# Patient Record
Sex: MALE | Race: WHITE | NOT HISPANIC OR LATINO | URBAN - METROPOLITAN AREA
[De-identification: names, ages, dates, MRNs, and addresses within clinical notes are randomized per-mention and may not be internally consistent; named-entity substitution may affect disease eponyms.]

---

## 2020-11-05 ENCOUNTER — TRANSCRIBE ORDERS (OUTPATIENT)
Dept: ADMINISTRATIVE | Facility: HOSPITAL | Age: 70
End: 2020-11-05

## 2020-11-05 DIAGNOSIS — I71.2 THORACIC AORTIC ANEURYSM, WITHOUT RUPTURE (HCC): Primary | ICD-10-CM

## 2020-11-12 ENCOUNTER — HOSPITAL ENCOUNTER (OUTPATIENT)
Dept: RADIOLOGY | Age: 70
Discharge: HOME | End: 2020-11-12
Payer: COMMERCIAL

## 2020-11-12 DIAGNOSIS — I71.2 THORACIC AORTIC ANEURYSM, WITHOUT RUPTURE (HCC): ICD-10-CM

## 2020-11-12 PROCEDURE — 71250 CT THORAX DX C-: CPT

## 2020-11-12 PROCEDURE — G1004 CDSM NDSC: HCPCS

## 2022-08-18 ENCOUNTER — OFFICE VISIT (OUTPATIENT)
Dept: CARDIOLOGY CLINIC | Facility: CLINIC | Age: 72
End: 2022-08-18
Payer: COMMERCIAL

## 2022-08-18 VITALS
DIASTOLIC BLOOD PRESSURE: 70 MMHG | SYSTOLIC BLOOD PRESSURE: 106 MMHG | HEIGHT: 71 IN | BODY MASS INDEX: 26.46 KG/M2 | HEART RATE: 90 BPM | WEIGHT: 189 LBS

## 2022-08-18 DIAGNOSIS — Q24.8 ABNORMAL CARDIAC VALVE: Primary | ICD-10-CM

## 2022-08-18 DIAGNOSIS — Z13.6 SCREENING FOR CARDIOVASCULAR CONDITION: ICD-10-CM

## 2022-08-18 DIAGNOSIS — I10 PRIMARY HYPERTENSION: ICD-10-CM

## 2022-08-18 DIAGNOSIS — E78.5 HYPERLIPIDEMIA, UNSPECIFIED HYPERLIPIDEMIA TYPE: ICD-10-CM

## 2022-08-18 PROCEDURE — 99204 OFFICE O/P NEW MOD 45 MIN: CPT | Performed by: STUDENT IN AN ORGANIZED HEALTH CARE EDUCATION/TRAINING PROGRAM

## 2022-08-18 PROCEDURE — 93000 ELECTROCARDIOGRAM COMPLETE: CPT | Performed by: STUDENT IN AN ORGANIZED HEALTH CARE EDUCATION/TRAINING PROGRAM

## 2022-08-18 RX ORDER — ASPIRIN 81 MG/1
81 TABLET ORAL DAILY
COMMUNITY

## 2022-08-18 RX ORDER — DIPHENOXYLATE HYDROCHLORIDE AND ATROPINE SULFATE 2.5; .025 MG/1; MG/1
1 TABLET ORAL DAILY
COMMUNITY
End: 2022-10-10

## 2022-08-18 RX ORDER — MAG HYDROX/ALUMINUM HYD/SIMETH 400-400-40
1 SUSPENSION, ORAL (FINAL DOSE FORM) ORAL DAILY
COMMUNITY
End: 2022-10-10

## 2022-08-18 RX ORDER — LISINOPRIL 20 MG/1
1 TABLET ORAL DAILY
COMMUNITY
Start: 2022-08-13 | End: 2022-10-10

## 2022-08-18 NOTE — PROGRESS NOTES
Outpatient Cardiology Consult Note - [facility] 74 y o  male MRN: [id number]    @ Encounter: [id number]      There are no problems to display for this patient  Assessment:  74 y o  male Hx per chart p/w second opinion for timing of intervention for Ao aneurysm, BAV, severe AS, moderate AI; follows at [facility] male   at [facility] hip OA  Follows cards and CTS at [facility]  HTN  Asc Ao aneurysm 4 6cm  HLD  Bicuspid Ao valve, severe AS, considering intervention now, sees CTS at [facility]  Moderate AI      a1c 5 9  tsh 2 2  ASCVD 32% at 8 yr  a1c 5 9    CT chest 9/2021  Heavy aortic valve calcification with fusiform ascending aortic aneurysm  Unchanged 4 5 cm     TODAY's PLAN:  - provided extensive counseling and advice regarding his cardiac conditions  - he is reticent to have intervention in immediate future, 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 and fact that he feels very well, asymptomatic  - advised that intervention likely inevitable, TAVR not ideal given pathology and need for Ao intervention (the patient asked about transcatheter "groin" options), would likely need open chest surgery of AV and Ao  - does extensive gym workouts and very active life - no symptoms or worse subtle fatigue whatsoever that he reports  - ordered exercise stress test to parse out symptoms and hemodynamic response to exercise - to better facilitate timing of intervention decision (must avoid very high BP 2/2 Au aneurysm during test)  Currently clinical presentation suggests very near term intervention though not immediate based on data we have now; progression is likely  Will check new TTE before stress test, particularly for re-check Asc Ao anuerysm size  - he is considering moving care between [facility] vs [facility] [place] but unsure yet  Further plan pending this decision by patient  - No [facility] cardiac imaging in [facility]   Has not seen [facility] CTS as far as I can tell, last [facility] TTE 1/2022 as below, last TTE 1/2022 per pt  - repeat VS in epic - better; he has a new sore throat slight fatigue but generally feels well  Advised drink copious water now, if feels any worse or has fever must go to urgent care or ED; advised rest/tylenol/lozenges  - continue with current medications, no changes today    HPI:   74 y o  male Hx per chart p/w second opinion for timing of intervention for Ao aneurysm, BAV, severe AS, moderate AI; follows at [facility]  EF preserved and LV not dilated per OSH TTE  Aside from new sore throat as described above, he reports being in excellent health, 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, goes to gym multiple times weekly and uses weights, elliptical, situps - never gets worse sob, cp, palpitations, dizziness  Syncope in heat ovr 20 years ago, none since  Feels good energy level  Reports no progression of cardiac sx or fatigue in recent months/years  No new CP/SOB/dizziness/palpitations/syncope  No new leg swelling, PND, pillow orthopnea, unintentional weight changes  11/2021 OSH cards note:  Stable at 4 5-4 6 cm for the past 2-3 years  Follow up with CTS  Aware of ~5 cm threshold for surgery given his BAV  He will avoid heavy lifting and exertion  BP is controlled    Home meds:   aspirin 81 MG EC tablet Take 81 mg by mouth daily   cartilage/collagen/bor/hyalur (JOINT HEALTH ORAL) Take 120 capsules by mouth   lisinopriL (PRINIVIL,ZESTRIL) 20 MG tablet TAKE 1 TABLET BY MOUTH DAILY 90 tablet 3    magnesium oxide 200 mg magnesium chew Take by mouth  Magnesium w/ b6    multivitamin (THERAGRAN) tab Take 1 tablet by mouth daily   SAW PALMETTO FRUIT ORAL Take 1 tablet by mouth daily   sildenafil, antihypertensive, (REVATIO) 20 mg tablet Use 3-5 tabs PO prn prior to sexual activity 30 tablet 5     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   He drinks 2-3 glasses of red wine nightly  He is originally from [facility] [place] [facility]       No current tobacco use, alcohol abuse, illicit drug use  No past medical history on file  ROS:  10 point ROS negative except as specified in HPI    Allergies   Allergen Reactions    Pollen Extract Nasal Congestion       Current Outpatient Medications   Medication Instructions    aspirin (ECOTRIN LOW STRENGTH) 81 mg, Oral, Daily    lisinopril (ZESTRIL) 20 mg tablet 1 tablet, Oral, Daily    Magnesium 100 MG CAPS Oral    multivitamin (THERAGRAN) TABS 1 tablet, Oral, Daily    Saw [place] 450 MG CAPS 1 tablet, Oral, Daily        Social History     Socioeconomic History    Marital status: /Civil Union     Spouse name: Not on file    Number of children: Not on file    Years of education: Not on file    Highest education level: Not on file   Occupational History    Not on file   Tobacco Use    Smoking status: Former Smoker     Types: Cigarettes     Quit date:      Years since quittin 6    Smokeless tobacco: Never Used   Substance and Sexual Activity    Alcohol use: Not on file    Drug use: Not on file    Sexual activity: Not on file   Other Topics Concern    Not on file   Social History Narrative    Not on file     Social Determinants of Health     Financial Resource Strain: Not on file   Food Insecurity: Not on file   Transportation Needs: Not on file   Physical Activity: Not on file   Stress: Not on file   Social Connections: Not on file   Intimate Partner Violence: Not on file   Housing Stability: Not on file       No family history on file      Physical Exam:  Vitals:    22 1314 22 1411   BP: 92/50 106/70   BP Location: Right arm    Cuff Size: Standard    Pulse: (!) 110 90   Weight: 85 7 kg (189 lb)    Height: 5' 11" (1 803 m)      Repeat VS - 106/70, HR 90    Constitutional: NAD, non toxic  Ears/nose/mouth/throat: atraumatic  CV: RRR, nl S1S2, no murmurs/rubs/gallups, no JVD, no HJR  Resp: ctabl  GI: Soft, NTND  MSK: no swollen joints in exposed areas  Extr: No edema, WWP  Pysche: Normal affect  Neuro: appropriate in conversation  Skin: dry and intact in exposed areas    Labs & Results:    No results found for: WBC, HGB, HCT, MCV, PLT  No results found for: SODIUM, K, CL, CO2, BUN, CREATININE, GLUC, CALCIUM  No results found for: BNP   No results found for: CHOLESTEROL  No results found for: HDL  No results found for: TRIG  No results found for: [place]    Studies - personally reviewed by me:    EKG:  Personally reviewed by me  8/18/22: nsr, nl axis, LVH, LAE, anterior Q waves, non specific STT changes    [facility] data:  TTE (1/2022):  Left Ventricle: There is severe concentric hypertrophy  Systolic function is normal with an ejection fraction of 60-65%  There is grade I (mild) diastolic dysfunction and normal left atrial pressure   Left Atrium: Left atrium cavity is moderately dilated   Aortic Valve: There is at least moderate regurgitation  There is severe stenosis   Ascending Aorta: The aortic root is mildly dilated  The ascending aorta is mildly dilated   Tricuspid Valve: There is mild regurgitation  CT chest (9/21): Stable pulmonary nodularity at 3 years follow-up      Heavy aortic valve calcification with fusiform ascending aortic aneurysm  Unchanged    Direct review of study demonstrates unchanged appearance of aorta measuring approximately 4 5 cm in maximum diameter  Counseling / Coordination of Care  Time spent today 50 minutes  Greater than 50% of total time was spent with the patient and / or family counseling and / or coordination of care  We discussed diagnoses, most recent studies and any changes in treatment  Thank you for the opportunity to participate in the care of this patient      Delaney Jackson MD  Attending Physician  Advanced Heart Failure and Transplant Cardiology  "[employer]" [facility]

## 2022-08-19 ENCOUNTER — TELEPHONE (OUTPATIENT)
Dept: CARDIOLOGY CLINIC | Facility: CLINIC | Age: 72
End: 2022-08-19

## 2022-08-19 ENCOUNTER — OFFICE VISIT (OUTPATIENT)
Dept: UROLOGY | Facility: AMBULATORY SURGERY CENTER | Age: 72
End: 2022-08-19
Payer: COMMERCIAL

## 2022-08-19 VITALS
OXYGEN SATURATION: 97 % | SYSTOLIC BLOOD PRESSURE: 106 MMHG | HEART RATE: 95 BPM | DIASTOLIC BLOOD PRESSURE: 78 MMHG | BODY MASS INDEX: 25.52 KG/M2 | WEIGHT: 183 LBS

## 2022-08-19 DIAGNOSIS — Z12.5 SCREENING FOR PROSTATE CANCER: Primary | ICD-10-CM

## 2022-08-19 LAB
SL AMB  POCT GLUCOSE, UA: NORMAL
SL AMB LEUKOCYTE ESTERASE,UA: NORMAL
SL AMB POCT BILIRUBIN,UA: NORMAL
SL AMB POCT BLOOD,UA: NORMAL
SL AMB POCT CLARITY,UA: CLEAR
SL AMB POCT COLOR,UA: YELLOW
SL AMB POCT KETONES,UA: NORMAL
SL AMB POCT NITRITE,UA: NORMAL
SL AMB POCT PH,UA: 6.5
SL AMB POCT SPECIFIC GRAVITY,UA: 1
SL AMB POCT URINE PROTEIN: NORMAL
SL AMB POCT UROBILINOGEN: 0.2

## 2022-08-19 PROCEDURE — 81002 URINALYSIS NONAUTO W/O SCOPE: CPT | Performed by: UROLOGY

## 2022-08-19 PROCEDURE — 99204 OFFICE O/P NEW MOD 45 MIN: CPT | Performed by: UROLOGY

## 2022-08-19 NOTE — TELEPHONE ENCOUNTER
----- Message from Dulce Cooper MD sent at 8/19/2022  2:14 PM EDT -----  Regarding: echo scheduling request  Please schedule Mr Veronica Vora for an echo in the next week  Echo must be done BEFORE his already scheduled stress test  Thnx  I called but his wife said call back    His stress test is scheduled 8/29/22

## 2022-08-19 NOTE — PROGRESS NOTES
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 [employer]  He has history of previous elevated PSA and gross hematuria, likely associated with prostatitis  CT urogram and cystoscopy were [facility] at [facility] in 2017  He also has a prior history of prostate biopsy in  which did not reveal any malignancy  Over the last few years his PSA has been stable around 1 5  His last PSA was in   He denies any urinary complaints at this time  He is looking to transfer care and seeks evaluation and prostate cancer screening  He takes saw palmetto as a supplement and is happy with this  Review of Systems  Review of Systems      Past Medical History  Past Medical History:   Diagnosis Date    Prostatitis        Past Social History  History reviewed  No pertinent surgical history  Past Family History  History reviewed  No pertinent family history      Past Social history  Social History     Socioeconomic History    Marital status: /Civil Union     Spouse name: Not on file    Number of children: Not on file    Years of education: Not on file    Highest education level: Not on file   Occupational History    Not on file   Tobacco Use    Smoking status: Former Smoker     Types: Cigarettes     Quit date: 1963     Years since quittin 6    Smokeless tobacco: Never Used    Tobacco comment: very light social as a teenager   Vaping Use    Vaping Use: Never used   Substance and Sexual Activity    Alcohol use: Yes     Alcohol/week: 14 0 standard drinks     Types: 14 Glasses of wine per week    Drug use: Not Currently    Sexual activity: Yes     Partners: Female   Other Topics Concern    Not on file   Social History Narrative    Not on file     Social Determinants of Health     Financial Resource Strain: Not on file   Food Insecurity: Not on file   Transportation Needs: Not on file   Physical Activity: Not on file   Stress: Not on file   Social Connections: Not on file   Intimate Partner Violence: Not on file   Housing Stability: Not on file     Social History     Tobacco Use   Smoking Status Former Smoker    Types: Cigarettes    Quit date:     Years since quittin 6   Smokeless Tobacco Never Used   Tobacco Comment    very light social as a teenager       Current Medications  Current Outpatient Medications   Medication Sig Dispense Refill    aspirin (ECOTRIN LOW STRENGTH) 81 mg EC tablet Take 81 mg by mouth daily      lisinopril (ZESTRIL) 20 mg tablet Take 1 tablet by mouth daily      Magnesium 100 MG CAPS Take by mouth      multivitamin (THERAGRAN) TABS Take 1 tablet by mouth daily      Saw [place] 450 MG CAPS Take 1 tablet by mouth daily       No current facility-administered medications for this visit  Allergies  Allergies   Allergen Reactions    Pollen Extract Nasal Congestion       Past Medical History, Social History, Family History, medications and allergies were reviewed  Vitals  Vitals:    22 1356   BP: 106/78   BP Location: Right arm   Patient Position: Sitting   Cuff Size: Adult   Pulse: 95   SpO2: 97%   Weight: 83 kg (183 lb)       Physical Exam  Physical Exam  Vitals reviewed  Constitutional:       Appearance: He is well-developed  HENT:      Head: Normocephalic and atraumatic  Eyes:      Conjunctiva/sclera: Conjunctivae normal    Cardiovascular:      Rate and Rhythm: Normal rate     Pulmonary:      Effort: Pulmonary effort is normal  Abdominal:      Palpations: Abdomen is soft  Genitourinary:     Comments: Prostate smooth, about 35 g, no palpable nodules  Musculoskeletal:         General: Normal range of motion  Skin:     General: Skin is warm and dry  Neurological:      Mental Status: He is alert and oriented to person, place, and time     Psychiatric:         Mood and Affect: Mood normal            Results  No results found for: PSA  No results found for: GLUCOSE, CALCIUM, NA, K, CO2, CL, BUN, CREATININE  No results found for: WBC, HGB, HCT, MCV, PLT

## 2022-08-25 ENCOUNTER — HOSPITAL ENCOUNTER (OUTPATIENT)
Dept: NON INVASIVE DIAGNOSTICS | Facility: HOSPITAL | Age: 72
Discharge: HOME | End: 2022-08-25
Attending: STUDENT IN AN ORGANIZED HEALTH CARE EDUCATION/TRAINING PROGRAM
Payer: COMMERCIAL

## 2022-08-25 VITALS
BODY MASS INDEX: 25.62 KG/M2 | WEIGHT: 183 LBS | SYSTOLIC BLOOD PRESSURE: 106 MMHG | HEART RATE: 95 BPM | DIASTOLIC BLOOD PRESSURE: 78 MMHG | HEIGHT: 71 IN

## 2022-08-25 DIAGNOSIS — Q24.8 ABNORMAL CARDIAC VALVE: ICD-10-CM

## 2022-08-25 PROCEDURE — 93306 TTE W/DOPPLER COMPLETE: CPT

## 2022-08-26 LAB
AORTIC ROOT: 4.1 CM
AORTIC VALVE MEAN VELOCITY: 33.5 M/S
ASCENDING AORTA: 4.2 CM
AV AREA BY CONTINUOUS VTI: 0.8 CM2
AV AREA PEAK VELOCITY: 0.9 CM2
AV LVOT MEAN GRADIENT: 4 MMHG
AV LVOT PEAK GRADIENT: 7 MMHG
AV MEAN GRADIENT: 49 MMHG
AV PEAK GRADIENT: 75 MMHG
AV REGURGITATION PRESSURE HALF TIME: 423 MS
AV VALVE AREA: 0.85 CM2
AV VELOCITY RATIO: 0.3
DOP CALC AO PEAK VEL: 4.34 M/S
DOP CALC AO VTI: 101.34 CM
DOP CALC LVOT AREA: 2.83 CM2
DOP CALC LVOT DIAMETER: 1.9 CM
DOP CALC LVOT PEAK VEL VTI: 30.31 CM
DOP CALC LVOT PEAK VEL: 1.31 M/S
DOP CALC LVOT STROKE INDEX: 40.4 ML/M2
DOP CALC LVOT STROKE VOLUME: 85.89
E WAVE DECELERATION TIME: 311 MS
FRACTIONAL SHORTENING: 35 (ref 28–44)
INTERVENTRICULAR SEPTUM IN DIASTOLE (PARASTERNAL SHORT AXIS VIEW): 1.8 CM
INTERVENTRICULAR SEPTUM: 1.8 CM (ref 0.6–1.1)
LAAS-AP4: 23 CM2
LEFT ATRIUM SIZE: 4.9 CM
LEFT INTERNAL DIMENSION IN SYSTOLE: 3 CM (ref 2.1–4)
LEFT VENTRICULAR INTERNAL DIMENSION IN DIASTOLE: 4.6 CM (ref 3.5–6)
LEFT VENTRICULAR POSTERIOR WALL IN END DIASTOLE: 1.4 CM
LEFT VENTRICULAR STROKE VOLUME: 61 ML
LVSV (TEICH): 61 ML
MV E'TISSUE VEL-SEP: 5 CM/S
MV PEAK A VEL: 0.81 M/S
MV PEAK E VEL: 63 CM/S
RIGHT ATRIAL 2D VOLUME: 34 ML
RIGHT ATRIUM AREA SYSTOLE A4C: 15 CM2
RIGHT VENTRICLE ID DIMENSION: 3.4 CM
SL CV AV DECELERATION TIME RETROGRADE: 1458 MS
SL CV AV PEAK GRADIENT RETROGRADE: 79 MMHG
SL CV LV EF: 70
SL CV PED ECHO LEFT VENTRICLE DIASTOLIC VOLUME (MOD BIPLANE) 2D: 96 ML
SL CV PED ECHO LEFT VENTRICLE SYSTOLIC VOLUME (MOD BIPLANE) 2D: 35 ML

## 2022-08-26 PROCEDURE — 93306 TTE W/DOPPLER COMPLETE: CPT | Performed by: STUDENT IN AN ORGANIZED HEALTH CARE EDUCATION/TRAINING PROGRAM

## 2022-08-29 ENCOUNTER — HOSPITAL ENCOUNTER (OUTPATIENT)
Dept: NON INVASIVE DIAGNOSTICS | Facility: CLINIC | Age: 72
Discharge: HOME | End: 2022-08-29
Payer: COMMERCIAL

## 2022-08-29 VITALS
BODY MASS INDEX: 25.62 KG/M2 | SYSTOLIC BLOOD PRESSURE: 134 MMHG | HEART RATE: 92 BPM | HEIGHT: 71 IN | WEIGHT: 183 LBS | DIASTOLIC BLOOD PRESSURE: 78 MMHG | OXYGEN SATURATION: 99 %

## 2022-08-29 DIAGNOSIS — Q24.8 ABNORMAL CARDIAC VALVE: ICD-10-CM

## 2022-08-29 LAB
MAX HR PERCENT: 89 %
MAX HR: 130 BPM
RATE PRESSURE PRODUCT: NORMAL
SL CV STRESS RECOVERY BP: NORMAL MMHG
SL CV STRESS RECOVERY HR: 105 BPM
SL CV STRESS RECOVERY O2 SAT: 99 %
SL CV STRESS STAGE REACHED: 4
STRESS ANGINA INDEX: 0
STRESS BASELINE BP: NORMAL MMHG
STRESS BASELINE HR: 92 BPM
STRESS DUKE TREADMILL SCORE: 7
STRESS O2 SAT REST: 99 %
STRESS PEAK HR: 124 BPM
STRESS POST ESTIMATED WORKLOAD: 7 METS
STRESS POST EXERCISE DUR MIN: 12 MIN
STRESS POST EXERCISE DUR SEC: 0 SEC
STRESS POST O2 SAT PEAK: 99 %
STRESS POST PEAK BP: 94 MMHG
STRESS ST DEPRESSION: 1 MM

## 2022-08-29 PROCEDURE — 93016 CV STRESS TEST SUPVJ ONLY: CPT | Performed by: INTERNAL MEDICINE

## 2022-08-29 PROCEDURE — 93018 CV STRESS TEST I&R ONLY: CPT | Performed by: INTERNAL MEDICINE

## 2022-08-29 PROCEDURE — 93017 CV STRESS TEST TRACING ONLY: CPT

## 2022-08-31 LAB
CHEST PAIN STATEMENT: NORMAL
MAX DIASTOLIC BP: 78 MMHG
MAX HEART RATE: 130 BPM
MAX PREDICTED HEART RATE: 146 BPM
MAX. SYSTOLIC BP: 134 MMHG
PROTOCOL NAME: NORMAL
REASON FOR TERMINATION: NORMAL
TARGET HR FORMULA: NORMAL
TEST INDICATION: NORMAL
TIME IN EXERCISE PHASE: NORMAL

## 2022-09-01 ENCOUNTER — TELEPHONE (OUTPATIENT)
Dept: CARDIOLOGY CLINIC | Facility: CLINIC | Age: 72
End: 2022-09-01

## 2022-09-01 DIAGNOSIS — Q24.8 ABNORMAL CARDIAC VALVE: Primary | ICD-10-CM

## 2022-09-01 NOTE — TELEPHONE ENCOUNTER
Paul Sport asked if you have reviewed his stress test form 8/29  He said he was advised that he "needed heart surgery right away"     Please advise

## 2022-09-01 NOTE — PROGRESS NOTES
Lengthy discussion with pt 9/1/22, regarding calcified BAV, Asc Ao aneurysm 4 6cm by outside CT scan (stable size), recent EST with hypotensive exercise response, dizziness, ischemic ECG  Pt is now interested in near term intervention  Referral to CTS placed  Further testing and LHC pending CTS evaluation

## 2022-09-09 ENCOUNTER — OFFICE VISIT (OUTPATIENT)
Dept: CARDIAC SURGERY | Facility: CLINIC | Age: 72
End: 2022-09-09
Payer: COMMERCIAL

## 2022-09-09 ENCOUNTER — TELEPHONE (OUTPATIENT)
Dept: CARDIAC SURGERY | Facility: CLINIC | Age: 72
End: 2022-09-09

## 2022-09-09 VITALS
SYSTOLIC BLOOD PRESSURE: 139 MMHG | DIASTOLIC BLOOD PRESSURE: 78 MMHG | OXYGEN SATURATION: 95 % | HEIGHT: 71 IN | HEART RATE: 84 BPM | WEIGHT: 189.5 LBS | BODY MASS INDEX: 26.53 KG/M2 | TEMPERATURE: 96.3 F

## 2022-09-09 DIAGNOSIS — Z11.59 SCREENING FOR VIRAL DISEASE: ICD-10-CM

## 2022-09-09 DIAGNOSIS — I71.21 ASCENDING AORTIC ANEURYSM: ICD-10-CM

## 2022-09-09 DIAGNOSIS — Q23.1 BICUSPID AORTIC VALVE: ICD-10-CM

## 2022-09-09 DIAGNOSIS — Q24.8 ABNORMAL CARDIAC VALVE: ICD-10-CM

## 2022-09-09 DIAGNOSIS — I35.1 NONRHEUMATIC AORTIC VALVE INSUFFICIENCY: ICD-10-CM

## 2022-09-09 DIAGNOSIS — I71.21 ASCENDING AORTIC ANEURYSM: Primary | ICD-10-CM

## 2022-09-09 DIAGNOSIS — R73.03 PRE-DIABETES: ICD-10-CM

## 2022-09-09 DIAGNOSIS — I35.0 NONRHEUMATIC AORTIC VALVE STENOSIS: Primary | ICD-10-CM

## 2022-09-09 DIAGNOSIS — Z13.6 ENCOUNTER FOR SCREENING FOR STENOSIS OF CAROTID ARTERY: ICD-10-CM

## 2022-09-09 DIAGNOSIS — E78.5 HYPERLIPIDEMIA, UNSPECIFIED HYPERLIPIDEMIA TYPE: ICD-10-CM

## 2022-09-09 PROCEDURE — 99204 OFFICE O/P NEW MOD 45 MIN: CPT | Performed by: THORACIC SURGERY (CARDIOTHORACIC VASCULAR SURGERY)

## 2022-09-09 RX ORDER — CHLORHEXIDINE GLUCONATE 0.12 MG/ML
15 RINSE ORAL ONCE
Status: CANCELLED | OUTPATIENT
Start: 2022-09-09 | End: 2022-09-09

## 2022-09-09 RX ORDER — CEFAZOLIN SODIUM 2 G/50ML
2000 SOLUTION INTRAVENOUS ONCE
Status: CANCELLED | OUTPATIENT
Start: 2022-09-09 | End: 2022-09-09

## 2022-09-09 NOTE — PATIENT INSTRUCTIONS
1  You will receive a phone call from the hospital between 2:00 PM and 8:00 PM the day prior to surgery to confirm arrival time and location  For surgery on Mondays, you will receive a call on Friday  2  Do not drink or eat anything after midnight the night before surgery  That includes no water, candy, gum, lozenges, Lifesavers, etc  We recommend you not eat any salty or fatty snack foods, consume alcohol or smoke the night before surgery  3  Continue taking aspirin but only 81 mg daily  4  If you take Coumadin and/or Plavix, discontinue it 5 days before surgery  5  If you are diabetic, do not take any of your diabetic pills the morning of surgery  If you take Lantus insulin, you may take it at your regularly scheduled time the day before surgery  Do not take any other insulins the morning of surgery  6  The 2 nights before surgery, take a shower each night using the special antiseptic soap or soap sponges you received from the office or hospital  [place] your hair with regular shampoo and rinse completely before using the antiseptic sponges  Use the sponge to wash from your neck down, with special attention to the armpits and groin area  Do not use any other soap or cleanser on your skin  Do not use lotions, powder, deodorant or perfume of any kind on your skin after you shower  Use clean bed linens and wear clean pajamas after your shower  7  You will be prescribed Mupirocin nasal ointment  Apply to both nostrils twice a day for 5 days prior to surgery  8  Do not take a shower the morning of surgery; you'll be given a special" bath" at the hospital   9  Notify the CT surgery office if you develop a cold, sore throat, cough, fever or other health issues before your surgery  10  Other medication changes included the following: Stop Multivitamin and Saw Palmetto now   Stop Lisinopril 3 days before surgery

## 2022-09-09 NOTE — H&P (VIEW-ONLY)
Consultation - Cardiothoracic Surgery   Rene Meier 74 y o  male MRN: [id number]    Physician Requesting Consult: Franklin Charles MD    Reason for Consult / Principal Problem: Bicuspid Aortic Valve, Aortic stenosis, Non-Rheumatic, Aortic Insufficiency, Ascending aortic aneurysm    History of Present Illness: Rene Meier is a 74y o  year old male referred for surgical consultation for severe aortic stenosis w/ moderate insufficiency of a bicuspid aortic valve and an ascending aortic aneurysm  His PMHx is notable for BAV,  HTN, HLD, AS/AI, Ascending aortic aneurysm, pre-DM (A1c 5 9%) and OA (hip)  Patient is [place], but lived in [place] years, then 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 [employer]  He established himself with a new Cardiologist [facility]) in 2016, previously followed by a Cardiologist in [facility]) for a BAV  Echo in Jan 2022 demonstrated severe AS  He was seen in consultation by Dr Sanz Prescarlos ([address]) in Jan 2022 who recommended surgery  Patient at the time wanted to wait to coordinate surgery with his teaching schedule  He sought a second opinion, recently seen by Dr Leonor Denny who referred patient to our service  Upon interview patient relates above story  He denies chest pain, SOB, SABILLON, lightheadedness, palpitations, fatigue, change in activity tolerance, weight gain or edema  He states he exercises regularly, 60-70 min several times a week at the gym including stretching, 20 min on Elliptical, free weights and weight machines  He denies a change in his exercise tolerance  Patient is a  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[place] [address] at [employer]  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  He is  lives with his wife  No tobacco use  Admits to 2 small glasses of wine and 2 shots of liquor (whisky, cognac or jason) every evening  No drug use      (1) Keely clemons vaccination 10/2021; incomplete    Colonoscopy 8/9/17    Dental care of to date, visit last week  Past Medical History:  Past Medical History:   Diagnosis Date    Aortic stenosis     Ascending aortic aneurysm (HCC)     Hyperlipidemia     Hypertension     Nonrheumatic aortic valve insufficiency     Osteoarthritis     Prostatitis          Past Surgical History:   Past Surgical History:   Procedure Laterality Date    COLONOSCOPY  08/09/2017    EYE MUSCLE SURGERY      stigmatism correction age 14    INGUINAL HERNIA REPAIR Left     SKIN CANCER EXCISION      TONSILECTOMY AND ADNOIDECTOMY           Family History:  History reviewed  No pertinent family history  Social History:    Social History     Substance and Sexual Activity   Alcohol Use Not Currently    Alcohol/week: 28 0 standard drinks    Types: 14 Glasses of wine, 14 Shots of liquor per week    Comment: 2 small glasses of wine & 2 shots whiskey daily     Social History     Substance and Sexual Activity   Drug Use Not Currently     Social History     Tobacco Use   Smoking Status Never Smoker   Smokeless Tobacco Never Used       Home Medications:   Prior to Admission medications    Medication Sig Start Date End Date Taking? Authorizing Provider   aspirin (ECOTRIN LOW STRENGTH) 81 mg EC tablet Take 81 mg by mouth daily   Yes Historical Provider, MD   Bioflavonoid Products (FRANNY C PO) Take by mouth   Yes Historical Provider, MD   Cyanocobalamin (VITAMIN B 12 PO) Take by mouth   Yes Historical Provider, MD   lisinopril (ZESTRIL) 20 mg tablet Take 1 tablet by mouth daily 8/13/22  Yes Historical Provider, MD   Magnesium 100 MG CAPS Take by mouth   Yes Historical Provider, MD   multivitamin (THERAGRAN) TABS Take 1 tablet by mouth daily   Yes Historical Provider, MD Moon Matthews 450 MG CAPS Take 1 tablet by mouth daily   Yes Historical Provider, MD       Allergies:   Allergies   Allergen Reactions    Pollen Extract Nasal Congestion       Review of Systems:  Review of Systems - History obtained from chart review and the patient  General ROS: negative  Psychological ROS: negative  Ophthalmic ROS: positive for - uses glasses  ENT ROS: positive for - epistaxis  Allergy and Immunology ROS: negative  Hematological and Lymphatic ROS: positive for - bruising  negative for - bleeding problems, blood clots, blood transfusions or jaundice  Endocrine ROS: negative  Breast ROS: negative  Respiratory ROS: no cough, shortness of breath, or wheezing  Cardiovascular ROS: positive for - murmur  negative for - chest pain, dyspnea on exertion, edema, irregular heartbeat, loss of consciousness, palpitations, paroxysmal nocturnal dyspnea or rapid heart rate  Gastrointestinal ROS: no abdominal pain, change in bowel habits, or black or bloody stools  Genito-Urinary ROS: no dysuria, trouble voiding, or hematuria  Musculoskeletal ROS: positive for - joint pain  Neurological ROS: no TIA or stroke symptoms  Dermatological ROS: negative    Vital Signs:     Vitals:    09/09/22 1002 09/09/22 1006   BP: 141/80 139/78   BP Location: Left arm Right arm   Patient Position: Sitting Sitting   Cuff Size: Standard Standard   Pulse: 84    Temp: (!) 96 3 °F (35 7 °C)    TempSrc: Tympanic    SpO2: 95%    Weight: 86 kg (189 lb 8 oz)    Height: 5' 11" (1 803 m)        Physical Exam:    General: Alert, oriented, well developed, no acute distress  HEENT/NECK:  PERRLA  No jugular venous distention  Cardiac:Regular rate and rhythm, II/VI harsh systolic murmur RUSB  Carotid arteries: 2+ pulse,s no bruits  Pulmonary:  Breath sounds clear bilaterally  Abdomen:  Non-tender, Non-distended  Positive bowel sounds  Upper extremities: 2+ radial pulses; brisk capillary refill; LEFT hand dominance  Lower extremities: Extremities warm/dry  PT/DP pulses 2+ bilaterally  No edema B/L  Neuro: Alert and oriented X 3  Sensation is grossly intact  No focal deficits  Musculoskeletal: MAEE, stable gait  Skin: Warm/Dry, without rashes or lesions      Lab Results:   4/12/22  8:35 AM     Glucose 65 - 99 mg/dL 113 High     BUN 7 - 28 mg/dL 17    Creatinine 0 53 - 1 3 mg/dL 0 84    Sodium 135 - 145 mmol/L 137    Potassium 3 5 - 5 2 mmol/L 4 9    Chloride 100 - 109 mmol/L 103    Carbon Dioxide 23 - 31 mmol/L 27    Calcium 8 5 - 10 1 mg/dL 9 8    Alkaline Phosphatase 35 - 120 U/L 35    Albumin 3 5 - 4 8 g/dL 4 2    Bilirubin, Total 0 2 - 1 mg/dL 0 7    Comment: Use of this assay is not recommended for patients undergoing treatment with eltrombopag due to the potential for falsely elevated results  Protein, Total 6 3 - 8 3 g/dL 7 3    AST <41 U/L 13    ALT <56 U/L 19    Anion Gap 3 - 11 7    eGFRcr >59 92          Lab Results   Component Value Date    HGBA1C 5 9 (H) 04/12/2022       Imaging Studies:     Echocardiogram: 8/25/22      Left Ventricle: Left ventricular cavity size is normal  Wall thickness is moderate to severely increased  There is moderate concentric hypertrophy  There is severe asymmetric hypertrophy of the septal wall  The left ventricular ejection fraction is 70%  Systolic function is vigorous  Wall motion is normal  Diastolic function is mildly abnormal, consistent with grade I (abnormal) relaxation    Left Atrium: The atrium is mildly dilated    Aortic Valve: The aortic valve is functionally bicuspid with commissural fusion of the right-left coronary cusp  The leaflets are severely thickened  The leaflets are moderately calcified  There is severely reduced mobility  There is moderate regurgitation  There is severe stenosis  The aortic valve peak velocity is 4 34 m/s  The aortic valve mean gradient is 49 mmHg  The dimensionless velocity index is 0 30  The aortic valve area is 0 85 cm2    Mitral Valve: There is mild to moderate annular calcification  There is mild regurgitation    Tricuspid Valve: There is mild regurgitation    Pulmonic Valve: There is mild regurgitation    Aorta: The aortic root is mildly dilated   The ascending aorta is mildly dilated  The aortic root is 4 10 cm  The ascending aorta is 4 2 cm      Findings    Left Ventricle Left ventricular cavity size is normal  Wall thickness is moderate to severely increased  There is moderate concentric hypertrophy  There is severe asymmetric hypertrophy of the septal wall  The left ventricular ejection fraction is 70%  Systolic function is vigorous  Wall motion is normal  Diastolic function is mildly abnormal, consistent with grade I (abnormal) relaxation  Right Ventricle Right ventricular cavity size is normal  Systolic function is normal  Wall thickness is normal    Left Atrium The atrium is mildly dilated  Right Atrium The atrium is normal in size  Aortic Valve The aortic valve is functionally bicuspid with commissural fusion of the right-left coronary cusp  The leaflets are severely thickened  The leaflets are moderately calcified  There is severely reduced mobility  There is moderate regurgitation  There is severe stenosis  The aortic valve peak velocity is 4 34 m/s  The aortic valve mean gradient is 49 mmHg  The dimensionless velocity index is 0 30  The aortic valve area is 0 85 cm2  Mitral Valve There is mild to moderate annular calcification  There is mild regurgitation  There is no evidence of stenosis  The mitral valve has normal structure and normal function  Tricuspid Valve Tricuspid valve structure is normal  There is mild regurgitation  There is no evidence of stenosis  Right ventricular systolic pressure could not be assessed  Pulmonic Valve Pulmonic valve structure is normal  There is mild regurgitation  There is no evidence of stenosis  Ascending Aorta The aortic root is mildly dilated  The ascending aorta is mildly dilated  The aortic root is 4 10 cm  The ascending aorta is 4 2 cm  IVC/SVC The inferior vena cava is normal in size  Pericardium There is no pericardial effusion  The pericardium is normal in appearance       Left Ventricle Measurements    Function/Volumes   LVOT stroke volume 85 89          LVOT stroke volume index 40 4 ml/m2         Dimensions   LVIDd 4 6 cm         LVIDS 3 cm         IVSd 1 8 cm         LVPWd 1 4 cm         LVOT area 2 83 cm2         FS 35          Diastolic Filling   MV E' Tissue Velocity Septal 5 cm/s         E wave deceleration time 311 ms         MV Peak E Delfino 63 cm/s         MV Peak A Delfino 0 81 m/s          Report Measurements   AV LVOT peak gradient 7 mmHg              Interventricular Septum Measurements    Shunt Ratio   LVOT peak VTI 30 31 cm         LVOT peak delfino 1 31 m/s              Right Ventricle Measurements    Dimensions   RVID d 3 4 cm               Left Atrium Measurements    Dimensions   LA size 4 9 cm               Right Atrium Measurements    Dimensions   RA 2D Volume 34 mL         RAA A4C 15 cm2               Atrial Septum Measurements    Shunt Ratio   LVOT peak VTI 30 31 cm         LVOT peak delfino 1 31 m/s               Aortic Valve Measurements    Stenosis   Aortic valve peak velocity 4 34 m/s         LVOT peak delfino 1 31 m/s         Ao  34 cm         LVOT peak VTI 30 31 cm         AV mean gradient 49 mmHg         LVOT mn grad 4 mmHg         AV peak gradient 75 mmHg         AV LVOT peak gradient 7 mmHg         Dimensionless velociy index 0 3          Regurgitation   AV peak gradient 79 mmHg         AV Deceleration Time 1,458 ms         AV regurgitation pressure 1/2 time 423 ms         Area/Dimensions   AV valve area 0 85 cm2         AV area by cont VTI 0 8 cm2         AV area peak delfino 0 9 cm2         LVOT diameter 1 9 cm         LVOT area 2 83 cm2               Aorta Measurements    Aortic Dimensions   Ao root 4 1 cm         Asc Ao 4 2 cm                 CT Scan: 9/30/21 [facility]    Ascending Aortic Aneurysm 45 mm     I have personally reviewed pertinent films in PACS     PCP and Cardiology notes reviewed      Assessment:  Patient Active Problem List    Diagnosis Date Noted    Aortic stenosis  Ascending aortic aneurysm (HCC)     Nonrheumatic aortic valve insufficiency     Pre-diabetes     Hyperlipidemia     Bicuspid aortic valve        Impression/Plan:    Bicuspid Aortic Valve, Severe Aortic Stenosis, Moderate Aortic Insufficiency  Ascending Aortic Aneurysm  Risks and benefits of aortic valve replacement +/- ascending aortic aneurysm repair was discussed in detail today with the patient and wife  They understand and wish to proceed with further workup and ultimately surgical intervention  We have ordered routine preoperative laboratory tests, CTA imaging, cardiac cath and vascular studies  Pending the results of these tests, they will be scheduled for surgery 10/4/22 with MATEO Frost  Pre -op surgical instructions provided to patient  He was instructed to stop Multivitamin and Saw Palmetto now and Stop Lisinopril 3 days before surgery  Kelly Tavares was comfortable with our recommendations, and their questions were answered to their satisfaction  Thank you for allowing us to participate in the care of this patient  The patient recently had a screening colonoscopy in 2017  Therefore GI referral is not indicated at this time       SIGNATURE: ANT Avila  DATE: September 9, 2022  TIME: 11:20 AM

## 2022-09-09 NOTE — TELEPHONE ENCOUNTER
Patient was seen by Dr Tad Luna and is scheduled for surgery on 10/4/22  He will need a Cardiac Cath  prior to surgery  Please contact the patient to schedule

## 2022-09-09 NOTE — H&P (VIEW-ONLY)
Consultation - Cardiothoracic Surgery   Samanta Lin 74 y o  male MRN: [id number]    Physician Requesting Consult: Acacia Chanel MD    Reason for Consult / Principal Problem: Bicuspid Aortic Valve, Aortic stenosis, Non-Rheumatic, Aortic Insufficiency, Ascending aortic aneurysm    History of Present Illness: Samanta Lni is a 74y o  year old male referred for surgical consultation for severe aortic stenosis w/ moderate insufficiency of a bicuspid aortic valve and an ascending aortic aneurysm  His PMHx is notable for BAV,  HTN, HLD, AS/AI, Ascending aortic aneurysm, pre-DM (A1c 5 9%) and OA (hip)  Patient is [place], but lived in [place] years, then 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 [employer]  He established himself with a new Cardiologist [facility]) in 2016, previously followed by a Cardiologist in [facility]) for a BAV  Echo in Jan 2022 demonstrated severe AS  He was seen in consultation by Dr Kasey Rogers ([address]) in Jan 2022 who recommended surgery  Patient at the time wanted to wait to coordinate surgery with his teaching schedule  He sought a second opinion, recently seen by Dr Dean Garcia who referred patient to our service  Upon interview patient relates above story  He denies chest pain, SOB, SABILLON, lightheadedness, palpitations, fatigue, change in activity tolerance, weight gain or edema  He states he exercises regularly, 60-70 min several times a week at the gym including stretching, 20 min on Elliptical, free weights and weight machines  He denies a change in his exercise tolerance  Patient is a  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[place] [address] at [employer]  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  He is  lives with his wife  No tobacco use  Admits to 2 small glasses of wine and 2 shots of liquor (whisky, cognac or jason) every evening  No drug use      (1) Angelica Middleton covid vaccination 10/2021; incomplete    Colonoscopy 8/9/17    Dental care of to date, visit last week  Past Medical History:  Past Medical History:   Diagnosis Date   • Aortic stenosis    • Ascending aortic aneurysm (HCC)    • Hyperlipidemia    • Hypertension    • Nonrheumatic aortic valve insufficiency    • Osteoarthritis    • Prostatitis          Past Surgical History:   Past Surgical History:   Procedure Laterality Date   • COLONOSCOPY  08/09/2017   • EYE MUSCLE SURGERY      stigmatism correction age 13   • INGUINAL HERNIA REPAIR Left    • SKIN CANCER EXCISION     • TONSILECTOMY AND ADNOIDECTOMY           Family History:  History reviewed  No pertinent family history  Social History:    Social History     Substance and Sexual Activity   Alcohol Use Not Currently   • Alcohol/week: 28 0 standard drinks   • Types: 14 Glasses of wine, 14 Shots of liquor per week    Comment: 2 small glasses of wine & 2 shots whiskey daily     Social History     Substance and Sexual Activity   Drug Use Not Currently     Social History     Tobacco Use   Smoking Status Never Smoker   Smokeless Tobacco Never Used       Home Medications:   Prior to Admission medications    Medication Sig Start Date End Date Taking? Authorizing Provider   aspirin (ECOTRIN LOW STRENGTH) 81 mg EC tablet Take 81 mg by mouth daily   Yes Historical Provider, MD   Bioflavonoid Products (FRANNY C PO) Take by mouth   Yes Historical Provider, MD   Cyanocobalamin (VITAMIN B 12 PO) Take by mouth   Yes Historical Provider, MD   lisinopril (ZESTRIL) 20 mg tablet Take 1 tablet by mouth daily 8/13/22  Yes Historical Provider, MD   Magnesium 100 MG CAPS Take by mouth   Yes Historical Provider, MD   multivitamin (THERAGRAN) TABS Take 1 tablet by mouth daily   Yes Historical Provider, MD [place] 450 MG CAPS Take 1 tablet by mouth daily   Yes Historical Provider, MD       Allergies:   Allergies   Allergen Reactions   • Pollen Extract Nasal Congestion       Review of Systems:  Review of Systems - History obtained from chart review and the patient  General ROS: negative  Psychological ROS: negative  Ophthalmic ROS: positive for - uses glasses  ENT ROS: positive for - epistaxis  Allergy and Immunology ROS: negative  Hematological and Lymphatic ROS: positive for - bruising  negative for - bleeding problems, blood clots, blood transfusions or jaundice  Endocrine ROS: negative  Breast ROS: negative  Respiratory ROS: no cough, shortness of breath, or wheezing  Cardiovascular ROS: positive for - murmur  negative for - chest pain, dyspnea on exertion, edema, irregular heartbeat, loss of consciousness, palpitations, paroxysmal nocturnal dyspnea or rapid heart rate  Gastrointestinal ROS: no abdominal pain, change in bowel habits, or black or bloody stools  Genito-Urinary ROS: no dysuria, trouble voiding, or hematuria  Musculoskeletal ROS: positive for - joint pain  Neurological ROS: no TIA or stroke symptoms  Dermatological ROS: negative    Vital Signs:     Vitals:    09/09/22 1002 09/09/22 1006   BP: 141/80 139/78   BP Location: Left arm Right arm   Patient Position: Sitting Sitting   Cuff Size: Standard Standard   Pulse: 84    Temp: (!) 96 3 °F (35 7 °C)    TempSrc: Tympanic    SpO2: 95%    Weight: 86 kg (189 lb 8 oz)    Height: 5' 11" (1 803 m)        Physical Exam:    General: Alert, oriented, well developed, no acute distress  HEENT/NECK:  PERRLA  No jugular venous distention  Cardiac:Regular rate and rhythm, II/VI harsh systolic murmur RUSB  Carotid arteries: 2+ pulse,s no bruits  Pulmonary:  Breath sounds clear bilaterally  Abdomen:  Non-tender, Non-distended  Positive bowel sounds  Upper extremities: 2+ radial pulses; brisk capillary refill; LEFT hand dominance  Lower extremities: Extremities warm/dry  PT/DP pulses 2+ bilaterally  No edema B/L  Neuro: Alert and oriented X 3  Sensation is grossly intact  No focal deficits  Musculoskeletal: MAEE, stable gait  Skin: Warm/Dry, without rashes or lesions      Lab Results:   4/12/22  8:35 AM     Glucose 65 - 99 mg/dL 113 High     BUN 7 - 28 mg/dL 17    Creatinine 0 53 - 1 3 mg/dL 0 84    Sodium 135 - 145 mmol/L 137    Potassium 3 5 - 5 2 mmol/L 4 9    Chloride 100 - 109 mmol/L 103    Carbon Dioxide 23 - 31 mmol/L 27    Calcium 8 5 - 10 1 mg/dL 9 8    Alkaline Phosphatase 35 - 120 U/L 35    Albumin 3 5 - 4 8 g/dL 4 2    Bilirubin, Total 0 2 - 1 mg/dL 0 7    Comment: Use of this assay is not recommended for patients undergoing treatment with eltrombopag due to the potential for falsely elevated results  Protein, Total 6 3 - 8 3 g/dL 7 3    AST <41 U/L 13    ALT <56 U/L 19    Anion Gap 3 - 11 7    eGFRcr >59 92          Lab Results   Component Value Date    HGBA1C 5 9 (H) 04/12/2022       Imaging Studies:     Echocardiogram: 8/25/22    •  Left Ventricle: Left ventricular cavity size is normal  Wall thickness is moderate to severely increased  There is moderate concentric hypertrophy  There is severe asymmetric hypertrophy of the septal wall  The left ventricular ejection fraction is 70%  Systolic function is vigorous  Wall motion is normal  Diastolic function is mildly abnormal, consistent with grade I (abnormal) relaxation  •  Left Atrium: The atrium is mildly dilated  •  Aortic Valve: The aortic valve is functionally bicuspid with commissural fusion of the right-left coronary cusp  The leaflets are severely thickened  The leaflets are moderately calcified  There is severely reduced mobility  There is moderate regurgitation  There is severe stenosis  The aortic valve peak velocity is 4 34 m/s  The aortic valve mean gradient is 49 mmHg  The dimensionless velocity index is 0 30  The aortic valve area is 0 85 cm2  •  Mitral Valve: There is mild to moderate annular calcification  There is mild regurgitation  •  Tricuspid Valve: There is mild regurgitation  •  Pulmonic Valve: There is mild regurgitation  •  Aorta: The aortic root is mildly dilated   The ascending aorta is mildly dilated  The aortic root is 4 10 cm  The ascending aorta is 4 2 cm      Findings    Left Ventricle Left ventricular cavity size is normal  Wall thickness is moderate to severely increased  There is moderate concentric hypertrophy  There is severe asymmetric hypertrophy of the septal wall  The left ventricular ejection fraction is 70%  Systolic function is vigorous  Wall motion is normal  Diastolic function is mildly abnormal, consistent with grade I (abnormal) relaxation  Right Ventricle Right ventricular cavity size is normal  Systolic function is normal  Wall thickness is normal    Left Atrium The atrium is mildly dilated  Right Atrium The atrium is normal in size  Aortic Valve The aortic valve is functionally bicuspid with commissural fusion of the right-left coronary cusp  The leaflets are severely thickened  The leaflets are moderately calcified  There is severely reduced mobility  There is moderate regurgitation  There is severe stenosis  The aortic valve peak velocity is 4 34 m/s  The aortic valve mean gradient is 49 mmHg  The dimensionless velocity index is 0 30  The aortic valve area is 0 85 cm2  Mitral Valve There is mild to moderate annular calcification  There is mild regurgitation  There is no evidence of stenosis  The mitral valve has normal structure and normal function  Tricuspid Valve Tricuspid valve structure is normal  There is mild regurgitation  There is no evidence of stenosis  Right ventricular systolic pressure could not be assessed  Pulmonic Valve Pulmonic valve structure is normal  There is mild regurgitation  There is no evidence of stenosis  Ascending Aorta The aortic root is mildly dilated  The ascending aorta is mildly dilated  The aortic root is 4 10 cm  The ascending aorta is 4 2 cm  IVC/SVC The inferior vena cava is normal in size  Pericardium There is no pericardial effusion  The pericardium is normal in appearance       Left Ventricle Measurements    Function/Volumes   LVOT stroke volume 85 89          LVOT stroke volume index 40 4 ml/m2         Dimensions   LVIDd 4 6 cm         LVIDS 3 cm         IVSd 1 8 cm         LVPWd 1 4 cm         LVOT area 2 83 cm2         FS 35          Diastolic Filling   MV E' Tissue Velocity Septal 5 cm/s         E wave deceleration time 311 ms         MV Peak E Delfino 63 cm/s         MV Peak A Delfino 0 81 m/s          Report Measurements   AV LVOT peak gradient 7 mmHg              Interventricular Septum Measurements    Shunt Ratio   LVOT peak VTI 30 31 cm         LVOT peak delfino 1 31 m/s              Right Ventricle Measurements    Dimensions   RVID d 3 4 cm               Left Atrium Measurements    Dimensions   LA size 4 9 cm               Right Atrium Measurements    Dimensions   RA 2D Volume 34 mL         RAA A4C 15 cm2               Atrial Septum Measurements    Shunt Ratio   LVOT peak VTI 30 31 cm         LVOT peak delfino 1 31 m/s               Aortic Valve Measurements    Stenosis   Aortic valve peak velocity 4 34 m/s         LVOT peak delfino 1 31 m/s         Ao  34 cm         LVOT peak VTI 30 31 cm         AV mean gradient 49 mmHg         LVOT mn grad 4 mmHg         AV peak gradient 75 mmHg         AV LVOT peak gradient 7 mmHg         Dimensionless velociy index 0 3          Regurgitation   AV peak gradient 79 mmHg         AV Deceleration Time 1,458 ms         AV regurgitation pressure 1/2 time 423 ms         Area/Dimensions   AV valve area 0 85 cm2         AV area by cont VTI 0 8 cm2         AV area peak delfino 0 9 cm2         LVOT diameter 1 9 cm         LVOT area 2 83 cm2               Aorta Measurements    Aortic Dimensions   Ao root 4 1 cm         Asc Ao 4 2 cm                 CT Scan: 9/30/21 [facility]    Ascending Aortic Aneurysm 45 mm     I have personally reviewed pertinent films in PACS     PCP and Cardiology notes reviewed      Assessment:  Patient Active Problem List    Diagnosis Date Noted   • Aortic stenosis • Ascending aortic aneurysm (HCC)    • Nonrheumatic aortic valve insufficiency    • Pre-diabetes    • Hyperlipidemia    • Bicuspid aortic valve        Impression/Plan:    Bicuspid Aortic Valve, Severe Aortic Stenosis, Moderate Aortic Insufficiency  Ascending Aortic Aneurysm  Risks and benefits of aortic valve replacement +/- ascending aortic aneurysm repair was discussed in detail today with the patient and wife  They understand and wish to proceed with further workup and ultimately surgical intervention  We have ordered routine preoperative laboratory tests, CTA imaging, cardiac cath and vascular studies  Pending the results of these tests, they will be scheduled for surgery 10/4/22 with MATEO Saucedo  Pre -op surgical instructions provided to patient  He was instructed to stop Multivitamin and Saw Palmetto now and Stop Lisinopril 3 days before surgery  Mitcheal Paling was comfortable with our recommendations, and their questions were answered to their satisfaction  Thank you for allowing us to participate in the care of this patient  The patient recently had a screening colonoscopy in 2017  Therefore GI referral is not indicated at this time       SIGNATURE: ANT Cavazos  DATE: September 9, 2022  TIME: 11:20 AM

## 2022-09-09 NOTE — PROGRESS NOTES
Consultation - Cardiothoracic Surgery   Josr Castro 74 y o  male MRN: [id number]    Physician Requesting Consult: Elizabeth Velásquez MD    Reason for Consult / Principal Problem: Bicuspid Aortic Valve, Aortic stenosis, Non-Rheumatic, Aortic Insufficiency, Ascending aortic aneurysm    History of Present Illness: Josr Castro is a 74y o  year old male referred for surgical consultation for severe aortic stenosis w/ moderate insufficiency of a bicuspid aortic valve and an ascending aortic aneurysm  His PMHx is notable for BAV,  HTN, HLD, AS/AI, Ascending aortic aneurysm, pre-DM (A1c 5 9%) and OA (hip)  Patient is [place], but lived in [place] for [place] years, then 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 [employer]  He established himself with a new Cardiologist [facility]) in 2016, previously followed by a Cardiologist in [facility]) for a BAV  Echo in Jan 2022 demonstrated severe AS  He was seen in consultation by Dr Demetri Wallace ([address]) in Jan 2022 who recommended surgery  Patient at the time wanted to wait to coordinate surgery with his teaching schedule  He sought a second opinion, recently seen by Dr Wicho Johnson who referred patient to our service  Upon interview patient relates above story  He denies chest pain, SOB, SABILLON, lightheadedness, palpitations, fatigue, change in activity tolerance, weight gain or edema  He states he exercises regularly, 60-70 min several times a week at the gym including stretching, 20 min on Elliptical, free weights and weight machines  He denies a change in his exercise tolerance  Patient is a  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[place] [address] at [employer]  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  He is  lives with his wife  No tobacco use  Admits to 2 small glasses of wine and 2 shots of liquor (whisky, cognac or jason) every evening  No drug use      (1) Elma evansid vaccination 10/2021; incomplete    Colonoscopy 8/9/17    Dental care of to date, visit last week  Past Medical History:  Past Medical History:   Diagnosis Date    Aortic stenosis     Ascending aortic aneurysm (HCC)     Hyperlipidemia     Hypertension     Nonrheumatic aortic valve insufficiency     Osteoarthritis     Prostatitis          Past Surgical History:   Past Surgical History:   Procedure Laterality Date    COLONOSCOPY  08/09/2017    EYE MUSCLE SURGERY      stigmatism correction age 14    INGUINAL HERNIA REPAIR Left     SKIN CANCER EXCISION      TONSILECTOMY AND ADNOIDECTOMY           Family History:  History reviewed  No pertinent family history  Social History:    Social History     Substance and Sexual Activity   Alcohol Use Not Currently    Alcohol/week: 28 0 standard drinks    Types: 14 Glasses of wine, 14 Shots of liquor per week    Comment: 2 small glasses of wine & 2 shots whiskey daily     Social History     Substance and Sexual Activity   Drug Use Not Currently     Social History     Tobacco Use   Smoking Status Never Smoker   Smokeless Tobacco Never Used       Home Medications:   Prior to Admission medications    Medication Sig Start Date End Date Taking? Authorizing Provider   aspirin (ECOTRIN LOW STRENGTH) 81 mg EC tablet Take 81 mg by mouth daily   Yes Historical Provider, MD   Bioflavonoid Products (FRANNY C PO) Take by mouth   Yes Historical Provider, MD   Cyanocobalamin (VITAMIN B 12 PO) Take by mouth   Yes Historical Provider, MD   lisinopril (ZESTRIL) 20 mg tablet Take 1 tablet by mouth daily 8/13/22  Yes Historical Provider, MD   Magnesium 100 MG CAPS Take by mouth   Yes Historical Provider, MD   multivitamin (THERAGRAN) TABS Take 1 tablet by mouth daily   Yes Historical Provider, MD [place] 450 MG CAPS Take 1 tablet by mouth daily   Yes Historical Provider, MD       Allergies:   Allergies   Allergen Reactions    Pollen Extract Nasal Congestion       Review of Systems:  Review of Systems - History obtained from chart review and the patient  General ROS: negative  Psychological ROS: negative  Ophthalmic ROS: positive for - uses glasses  ENT ROS: positive for - epistaxis  Allergy and Immunology ROS: negative  Hematological and Lymphatic ROS: positive for - bruising  negative for - bleeding problems, blood clots, blood transfusions or jaundice  Endocrine ROS: negative  Breast ROS: negative  Respiratory ROS: no cough, shortness of breath, or wheezing  Cardiovascular ROS: positive for - murmur  negative for - chest pain, dyspnea on exertion, edema, irregular heartbeat, loss of consciousness, palpitations, paroxysmal nocturnal dyspnea or rapid heart rate  Gastrointestinal ROS: no abdominal pain, change in bowel habits, or black or bloody stools  Genito-Urinary ROS: no dysuria, trouble voiding, or hematuria  Musculoskeletal ROS: positive for - joint pain  Neurological ROS: no TIA or stroke symptoms  Dermatological ROS: negative    Vital Signs:     Vitals:    09/09/22 1002 09/09/22 1006   BP: 141/80 139/78   BP Location: Left arm Right arm   Patient Position: Sitting Sitting   Cuff Size: Standard Standard   Pulse: 84    Temp: (!) 96 3 °F (35 7 °C)    TempSrc: Tympanic    SpO2: 95%    Weight: 86 kg (189 lb 8 oz)    Height: 5' 11" (1 803 m)        Physical Exam:    General: Alert, oriented, well developed, no acute distress  HEENT/NECK:  PERRLA  No jugular venous distention  Cardiac:Regular rate and rhythm, II/VI harsh systolic murmur RUSB  Carotid arteries: 2+ pulse,s no bruits  Pulmonary:  Breath sounds clear bilaterally  Abdomen:  Non-tender, Non-distended  Positive bowel sounds  Upper extremities: 2+ radial pulses; brisk capillary refill; LEFT hand dominance  Lower extremities: Extremities warm/dry  PT/DP pulses 2+ bilaterally  No edema B/L  Neuro: Alert and oriented X 3  Sensation is grossly intact  No focal deficits  Musculoskeletal: MAEE, stable gait  Skin: Warm/Dry, without rashes or lesions      Lab Results:   4/12/22  8:35 AM     Glucose 65 - 99 mg/dL 113 High     BUN 7 - 28 mg/dL 17    Creatinine 0 53 - 1 3 mg/dL 0 84    Sodium 135 - 145 mmol/L 137    Potassium 3 5 - 5 2 mmol/L 4 9    Chloride 100 - 109 mmol/L 103    Carbon Dioxide 23 - 31 mmol/L 27    Calcium 8 5 - 10 1 mg/dL 9 8    Alkaline Phosphatase 35 - 120 U/L 35    Albumin 3 5 - 4 8 g/dL 4 2    Bilirubin, Total 0 2 - 1 mg/dL 0 7    Comment: Use of this assay is not recommended for patients undergoing treatment with eltrombopag due to the potential for falsely elevated results  Protein, Total 6 3 - 8 3 g/dL 7 3    AST <41 U/L 13    ALT <56 U/L 19    Anion Gap 3 - 11 7    eGFRcr >59 92          Lab Results   Component Value Date    HGBA1C 5 9 (H) 04/12/2022       Imaging Studies:     Echocardiogram: 8/25/22      Left Ventricle: Left ventricular cavity size is normal  Wall thickness is moderate to severely increased  There is moderate concentric hypertrophy  There is severe asymmetric hypertrophy of the septal wall  The left ventricular ejection fraction is 70%  Systolic function is vigorous  Wall motion is normal  Diastolic function is mildly abnormal, consistent with grade I (abnormal) relaxation    Left Atrium: The atrium is mildly dilated    Aortic Valve: The aortic valve is functionally bicuspid with commissural fusion of the right-left coronary cusp  The leaflets are severely thickened  The leaflets are moderately calcified  There is severely reduced mobility  There is moderate regurgitation  There is severe stenosis  The aortic valve peak velocity is 4 34 m/s  The aortic valve mean gradient is 49 mmHg  The dimensionless velocity index is 0 30  The aortic valve area is 0 85 cm2    Mitral Valve: There is mild to moderate annular calcification  There is mild regurgitation    Tricuspid Valve: There is mild regurgitation    Pulmonic Valve: There is mild regurgitation    Aorta: The aortic root is mildly dilated   The ascending aorta is mildly dilated  The aortic root is 4 10 cm  The ascending aorta is 4 2 cm      Findings    Left Ventricle Left ventricular cavity size is normal  Wall thickness is moderate to severely increased  There is moderate concentric hypertrophy  There is severe asymmetric hypertrophy of the septal wall  The left ventricular ejection fraction is 70%  Systolic function is vigorous  Wall motion is normal  Diastolic function is mildly abnormal, consistent with grade I (abnormal) relaxation  Right Ventricle Right ventricular cavity size is normal  Systolic function is normal  Wall thickness is normal    Left Atrium The atrium is mildly dilated  Right Atrium The atrium is normal in size  Aortic Valve The aortic valve is functionally bicuspid with commissural fusion of the right-left coronary cusp  The leaflets are severely thickened  The leaflets are moderately calcified  There is severely reduced mobility  There is moderate regurgitation  There is severe stenosis  The aortic valve peak velocity is 4 34 m/s  The aortic valve mean gradient is 49 mmHg  The dimensionless velocity index is 0 30  The aortic valve area is 0 85 cm2  Mitral Valve There is mild to moderate annular calcification  There is mild regurgitation  There is no evidence of stenosis  The mitral valve has normal structure and normal function  Tricuspid Valve Tricuspid valve structure is normal  There is mild regurgitation  There is no evidence of stenosis  Right ventricular systolic pressure could not be assessed  Pulmonic Valve Pulmonic valve structure is normal  There is mild regurgitation  There is no evidence of stenosis  Ascending Aorta The aortic root is mildly dilated  The ascending aorta is mildly dilated  The aortic root is 4 10 cm  The ascending aorta is 4 2 cm  IVC/SVC The inferior vena cava is normal in size  Pericardium There is no pericardial effusion  The pericardium is normal in appearance       Left Ventricle Measurements    Function/Volumes   LVOT stroke volume 85 89          LVOT stroke volume index 40 4 ml/m2         Dimensions   LVIDd 4 6 cm         LVIDS 3 cm         IVSd 1 8 cm         LVPWd 1 4 cm         LVOT area 2 83 cm2         FS 35          Diastolic Filling   MV E' Tissue Velocity Septal 5 cm/s         E wave deceleration time 311 ms         MV Peak E Delfino 63 cm/s         MV Peak A Delfino 0 81 m/s          Report Measurements   AV LVOT peak gradient 7 mmHg              Interventricular Septum Measurements    Shunt Ratio   LVOT peak VTI 30 31 cm         LVOT peak delfino 1 31 m/s              Right Ventricle Measurements    Dimensions   RVID d 3 4 cm               Left Atrium Measurements    Dimensions   LA size 4 9 cm               Right Atrium Measurements    Dimensions   RA 2D Volume 34 mL         RAA A4C 15 cm2               Atrial Septum Measurements    Shunt Ratio   LVOT peak VTI 30 31 cm         LVOT peak delfino 1 31 m/s               Aortic Valve Measurements    Stenosis   Aortic valve peak velocity 4 34 m/s         LVOT peak delfino 1 31 m/s         Ao  34 cm         LVOT peak VTI 30 31 cm         AV mean gradient 49 mmHg         LVOT mn grad 4 mmHg         AV peak gradient 75 mmHg         AV LVOT peak gradient 7 mmHg         Dimensionless velociy index 0 3          Regurgitation   AV peak gradient 79 mmHg         AV Deceleration Time 1,458 ms         AV regurgitation pressure 1/2 time 423 ms         Area/Dimensions   AV valve area 0 85 cm2         AV area by cont VTI 0 8 cm2         AV area peak delfino 0 9 cm2         LVOT diameter 1 9 cm         LVOT area 2 83 cm2               Aorta Measurements    Aortic Dimensions   Ao root 4 1 cm         Asc Ao 4 2 cm                 CT Scan: 9/30/21 [facility]    Ascending Aortic Aneurysm 45 mm     I have personally reviewed pertinent films in PACS     PCP and Cardiology notes reviewed      Assessment:  Patient Active Problem List    Diagnosis Date Noted    Aortic stenosis  Ascending aortic aneurysm (HCC)     Nonrheumatic aortic valve insufficiency     Pre-diabetes     Hyperlipidemia     Bicuspid aortic valve        Impression/Plan:    Bicuspid Aortic Valve, Severe Aortic Stenosis, Moderate Aortic Insufficiency  Ascending Aortic Aneurysm  Risks and benefits of aortic valve replacement +/- ascending aortic aneurysm repair was discussed in detail today with the patient and wife  They understand and wish to proceed with further workup and ultimately surgical intervention  We have ordered routine preoperative laboratory tests, CTA imaging, cardiac cath and vascular studies  Pending the results of these tests, they will be scheduled for surgery 10/4/22 with MATEO Peres  Pre -op surgical instructions provided to patient  He was instructed to stop Multivitamin and Saw [place]metto now and Stop Lisinopril 3 days before surgery  Vee Watson was comfortable with our recommendations, and their questions were answered to their satisfaction  Thank you for allowing us to participate in the care of this patient  The patient recently had a screening colonoscopy in 2017  Therefore GI referral is not indicated at this time       SIGNATURE: ANT Ng  DATE: September 9, 2022  TIME: 11:20 AM

## 2022-09-09 NOTE — H&P
Pre-Op History & Physical - Cardiothoracic Surgery   Sophie Preciado 74 y o  male MRN: [id number]    Physician Requesting Consult: Jean Paul Rodriguez MD    Reason for Consult / Principal Problem: Bicuspid Aortic Valve, Aortic stenosis, Non-Rheumatic, Aortic Insufficiency, Ascending aortic aneurysm    History of Present Illness: Sophie Preciado is a 74y o  year old male referred for surgical consultation for severe aortic stenosis w/ moderate insufficiency of a bicuspid aortic valve and an ascending aortic aneurysm  His PMHx is notable for BAV,  HTN, HLD, AS/AI, Ascending aortic aneurysm, pre-DM (A1c 5 9%) and OA (hip)  Patient is [place], but lived in [place] years, then 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 [employer]  He established himself with a new Cardiologist [facility]) in 2016, previously followed by a Cardiologist in [facility]) for a BAV  Echo in Jan 2022 demonstrated severe AS  He was seen in consultation by Dr Lebron Rey ([address]) in Jan 2022 who recommended surgery  Patient at the time wanted to wait to coordinate surgery with his teaching schedule  He sought a second opinion, recently seen by Dr Abilio Will who referred patient to our service  Upon interview patient relates above story  He denies chest pain, SOB, SABILLON, lightheadedness, palpitations, fatigue, change in activity tolerance, weight gain or edema  He states he exercises regularly, 60-70 min several times a week at the gym including stretching, 20 min on Elliptical, free weights and weight machines  He denies a change in his exercise tolerance  Patient is a  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[place] [address] at [employer]  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  He is  lives with his wife  No tobacco use  Admits to 2 small glasses of wine and 2 shots of liquor (whisky, cognac or jason) every evening  No drug use      (1) Nadine Beltran covid vaccination 10/2021; incomplete    Colonoscopy 8/9/17    Dental care of to date, visit last week  Past Medical History:  Past Medical History:   Diagnosis Date    Aortic stenosis     Ascending aortic aneurysm (HCC)     Hyperlipidemia     Hypertension     Nonrheumatic aortic valve insufficiency     Osteoarthritis     Prostatitis          Past Surgical History:   Past Surgical History:   Procedure Laterality Date    COLONOSCOPY  08/09/2017    EYE MUSCLE SURGERY      stigmatism correction age 14    INGUINAL HERNIA REPAIR Left     SKIN CANCER EXCISION      TONSILECTOMY AND ADNOIDECTOMY           Family History:  History reviewed  No pertinent family history  Social History:    Social History     Substance and Sexual Activity   Alcohol Use Not Currently    Alcohol/week: 28 0 standard drinks    Types: 14 Glasses of wine, 14 Shots of liquor per week    Comment: 2 small glasses of wine & 2 shots whiskey daily     Social History     Substance and Sexual Activity   Drug Use Not Currently     Social History     Tobacco Use   Smoking Status Never Smoker   Smokeless Tobacco Never Used       Home Medications:   Prior to Admission medications    Medication Sig Start Date End Date Taking? Authorizing Provider   aspirin (ECOTRIN LOW STRENGTH) 81 mg EC tablet Take 81 mg by mouth daily   Yes Historical Provider, MD   Bioflavonoid Products (FRANNY C PO) Take by mouth   Yes Historical Provider, MD   Cyanocobalamin (VITAMIN B 12 PO) Take by mouth   Yes Historical Provider, MD   lisinopril (ZESTRIL) 20 mg tablet Take 1 tablet by mouth daily 8/13/22  Yes Historical Provider, MD   Magnesium 100 MG CAPS Take by mouth   Yes Historical Provider, MD   multivitamin (THERAGRAN) TABS Take 1 tablet by mouth daily   Yes Historical Provider, MD Moon Woodlake 450 MG CAPS Take 1 tablet by mouth daily   Yes Historical Provider, MD       Allergies:   Allergies   Allergen Reactions    Pollen Extract Nasal Congestion       Review of Systems:  Review of Systems - History obtained from chart review and the patient  General ROS: negative  Psychological ROS: negative  Ophthalmic ROS: positive for - uses glasses  ENT ROS: positive for - epistaxis  Allergy and Immunology ROS: negative  Hematological and Lymphatic ROS: positive for - bruising  negative for - bleeding problems, blood clots, blood transfusions or jaundice  Endocrine ROS: negative  Breast ROS: negative  Respiratory ROS: no cough, shortness of breath, or wheezing  Cardiovascular ROS: positive for - murmur  negative for - chest pain, dyspnea on exertion, edema, irregular heartbeat, loss of consciousness, palpitations, paroxysmal nocturnal dyspnea or rapid heart rate  Gastrointestinal ROS: no abdominal pain, change in bowel habits, or black or bloody stools  Genito-Urinary ROS: no dysuria, trouble voiding, or hematuria  Musculoskeletal ROS: positive for - joint pain  Neurological ROS: no TIA or stroke symptoms  Dermatological ROS: negative    Vital Signs:     Vitals:    09/09/22 1002 09/09/22 1006   BP: 141/80 139/78   BP Location: Left arm Right arm   Patient Position: Sitting Sitting   Cuff Size: Standard Standard   Pulse: 84    Temp: (!) 96 3 °F (35 7 °C)    TempSrc: Tympanic    SpO2: 95%    Weight: 86 kg (189 lb 8 oz)    Height: 5' 11" (1 803 m)        Physical Exam:    General: Alert, oriented, well developed, no acute distress  HEENT/NECK:  PERRLA  No jugular venous distention  Cardiac:Regular rate and rhythm, II/VI harsh systolic murmur RUSB  Carotid arteries: 2+ pulse,s no bruits  Pulmonary:  Breath sounds clear bilaterally  Abdomen:  Non-tender, Non-distended  Positive bowel sounds  Upper extremities: 2+ radial pulses; brisk capillary refill; LEFT hand dominance  Lower extremities: Extremities warm/dry  PT/DP pulses 2+ bilaterally  No edema B/L  Neuro: Alert and oriented X 3  Sensation is grossly intact  No focal deficits    Musculoskeletal: MAEE, stable gait  Skin: Warm/Dry, without rashes or lesions  Lab Results:   4/12/22  8:35 AM     Glucose 65 - 99 mg/dL 113 High     BUN 7 - 28 mg/dL 17    Creatinine 0 53 - 1 3 mg/dL 0 84    Sodium 135 - 145 mmol/L 137    Potassium 3 5 - 5 2 mmol/L 4 9    Chloride 100 - 109 mmol/L 103    Carbon Dioxide 23 - 31 mmol/L 27    Calcium 8 5 - 10 1 mg/dL 9 8    Alkaline Phosphatase 35 - 120 U/L 35    Albumin 3 5 - 4 8 g/dL 4 2    Bilirubin, Total 0 2 - 1 mg/dL 0 7    Comment: Use of this assay is not recommended for patients undergoing treatment with eltrombopag due to the potential for falsely elevated results  Protein, Total 6 3 - 8 3 g/dL 7 3    AST <41 U/L 13    ALT <56 U/L 19    Anion Gap 3 - 11 7    eGFRcr >59 92          Lab Results   Component Value Date    HGBA1C 5 9 (H) 04/12/2022       Imaging Studies:     Echocardiogram: 8/25/22      Left Ventricle: Left ventricular cavity size is normal  Wall thickness is moderate to severely increased  There is moderate concentric hypertrophy  There is severe asymmetric hypertrophy of the septal wall  The left ventricular ejection fraction is 70%  Systolic function is vigorous  Wall motion is normal  Diastolic function is mildly abnormal, consistent with grade I (abnormal) relaxation    Left Atrium: The atrium is mildly dilated    Aortic Valve: The aortic valve is functionally bicuspid with commissural fusion of the right-left coronary cusp  The leaflets are severely thickened  The leaflets are moderately calcified  There is severely reduced mobility  There is moderate regurgitation  There is severe stenosis  The aortic valve peak velocity is 4 34 m/s  The aortic valve mean gradient is 49 mmHg  The dimensionless velocity index is 0 30  The aortic valve area is 0 85 cm2    Mitral Valve: There is mild to moderate annular calcification  There is mild regurgitation    Tricuspid Valve: There is mild regurgitation    Pulmonic Valve: There is mild regurgitation    Aorta: The aortic root is mildly dilated   The ascending aorta is mildly dilated  The aortic root is 4 10 cm  The ascending aorta is 4 2 cm      Findings    Left Ventricle Left ventricular cavity size is normal  Wall thickness is moderate to severely increased  There is moderate concentric hypertrophy  There is severe asymmetric hypertrophy of the septal wall  The left ventricular ejection fraction is 70%  Systolic function is vigorous  Wall motion is normal  Diastolic function is mildly abnormal, consistent with grade I (abnormal) relaxation  Right Ventricle Right ventricular cavity size is normal  Systolic function is normal  Wall thickness is normal    Left Atrium The atrium is mildly dilated  Right Atrium The atrium is normal in size  Aortic Valve The aortic valve is functionally bicuspid with commissural fusion of the right-left coronary cusp  The leaflets are severely thickened  The leaflets are moderately calcified  There is severely reduced mobility  There is moderate regurgitation  There is severe stenosis  The aortic valve peak velocity is 4 34 m/s  The aortic valve mean gradient is 49 mmHg  The dimensionless velocity index is 0 30  The aortic valve area is 0 85 cm2  Mitral Valve There is mild to moderate annular calcification  There is mild regurgitation  There is no evidence of stenosis  The mitral valve has normal structure and normal function  Tricuspid Valve Tricuspid valve structure is normal  There is mild regurgitation  There is no evidence of stenosis  Right ventricular systolic pressure could not be assessed  Pulmonic Valve Pulmonic valve structure is normal  There is mild regurgitation  There is no evidence of stenosis  Ascending Aorta The aortic root is mildly dilated  The ascending aorta is mildly dilated  The aortic root is 4 10 cm  The ascending aorta is 4 2 cm  IVC/SVC The inferior vena cava is normal in size  Pericardium There is no pericardial effusion  The pericardium is normal in appearance       Left Ventricle Measurements    Function/Volumes   LVOT stroke volume 85 89          LVOT stroke volume index 40 4 ml/m2         Dimensions   LVIDd 4 6 cm         LVIDS 3 cm         IVSd 1 8 cm         LVPWd 1 4 cm         LVOT area 2 83 cm2         FS 35          Diastolic Filling   MV E' Tissue Velocity Septal 5 cm/s         E wave deceleration time 311 ms         MV Peak E Delfino 63 cm/s         MV Peak A Delfino 0 81 m/s          Report Measurements   AV LVOT peak gradient 7 mmHg              Interventricular Septum Measurements    Shunt Ratio   LVOT peak VTI 30 31 cm         LVOT peak delfino 1 31 m/s              Right Ventricle Measurements    Dimensions   RVID d 3 4 cm               Left Atrium Measurements    Dimensions   LA size 4 9 cm               Right Atrium Measurements    Dimensions   RA 2D Volume 34 mL         RAA A4C 15 cm2               Atrial Septum Measurements    Shunt Ratio   LVOT peak VTI 30 31 cm         LVOT peak delfino 1 31 m/s               Aortic Valve Measurements    Stenosis   Aortic valve peak velocity 4 34 m/s         LVOT peak delfino 1 31 m/s         Ao  34 cm         LVOT peak VTI 30 31 cm         AV mean gradient 49 mmHg         LVOT mn grad 4 mmHg         AV peak gradient 75 mmHg         AV LVOT peak gradient 7 mmHg         Dimensionless velociy index 0 3          Regurgitation   AV peak gradient 79 mmHg         AV Deceleration Time 1,458 ms         AV regurgitation pressure 1/2 time 423 ms         Area/Dimensions   AV valve area 0 85 cm2         AV area by cont VTI 0 8 cm2         AV area peak delfino 0 9 cm2         LVOT diameter 1 9 cm         LVOT area 2 83 cm2               Aorta Measurements    Aortic Dimensions   Ao root 4 1 cm         Asc Ao 4 2 cm                 CT Scan: 9/30/21 [facility]    Ascending Aortic Aneurysm 45 mm     I have personally reviewed pertinent films in PACS     PCP and Cardiology notes reviewed      Assessment:  Patient Active Problem List    Diagnosis Date Noted    Aortic stenosis  Ascending aortic aneurysm (HCC)     Nonrheumatic aortic valve insufficiency     Pre-diabetes     Hyperlipidemia     Bicuspid aortic valve        Impression/Plan:    Bicuspid Aortic Valve, Severe Aortic Stenosis, Moderate Aortic Insufficiency  Ascending Aortic Aneurysm  Risks and benefits of aortic valve replacement +/- ascending aortic aneurysm repair was discussed in detail today with the patient and wife  They understand and wish to proceed with further workup and ultimately surgical intervention  We have ordered routine preoperative laboratory tests, CTA imaging, cardiac cath and vascular studies  Pending the results of these tests, they will be scheduled for surgery 10/4/22 with MATEO Aquino  Pre -op surgical instructions provided to patient  He was instructed to stop Multivitamin and Saw Palmetto now and Stop Lisinopril 3 days before surgery  Samanta Lin was comfortable with our recommendations, and their questions were answered to their satisfaction  Thank you for allowing us to participate in the care of this patient  The patient recently had a screening colonoscopy in 2017  Therefore GI referral is not indicated at this time       SIGNATURE: ANT Pierre  DATE: September 9, 2022  TIME: 11:20 AM

## 2022-09-13 ENCOUNTER — PREP FOR PROCEDURE (OUTPATIENT)
Dept: CARDIOLOGY CLINIC | Facility: CLINIC | Age: 72
End: 2022-09-13

## 2022-09-13 ENCOUNTER — TELEPHONE (OUTPATIENT)
Dept: CARDIOLOGY CLINIC | Facility: CLINIC | Age: 72
End: 2022-09-13

## 2022-09-13 DIAGNOSIS — I71.21 ASCENDING AORTIC ANEURYSM: Primary | ICD-10-CM

## 2022-09-13 DIAGNOSIS — I35.0 NONRHEUMATIC AORTIC VALVE STENOSIS: Primary | ICD-10-CM

## 2022-09-13 NOTE — TELEPHONE ENCOUNTER
Patient scheduled for R+[facility]HC on 22 in B with Dr Jenette Apgar  Instructions sent to patient through [address]  Can I have auth?

## 2022-09-21 ENCOUNTER — HOSPITAL ENCOUNTER (OUTPATIENT)
Dept: RADIOLOGY | Facility: HOSPITAL | Age: 72
Discharge: HOME | End: 2022-09-21

## 2022-09-21 DIAGNOSIS — I71.21 ASCENDING AORTIC ANEURYSM: ICD-10-CM

## 2022-09-21 DIAGNOSIS — I35.0 NONRHEUMATIC AORTIC VALVE STENOSIS: ICD-10-CM

## 2022-09-23 ENCOUNTER — APPOINTMENT (OUTPATIENT)
Dept: LAB | Facility: CLINIC | Age: 72
End: 2022-09-23
Payer: COMMERCIAL

## 2022-09-23 ENCOUNTER — LAB REQUISITION (OUTPATIENT)
Dept: LAB | Facility: HOSPITAL | Age: 72
End: 2022-09-23
Payer: COMMERCIAL

## 2022-09-23 ENCOUNTER — OFFICE VISIT (OUTPATIENT)
Dept: LAB | Facility: CLINIC | Age: 72
End: 2022-09-23
Payer: COMMERCIAL

## 2022-09-23 ENCOUNTER — HOSPITAL ENCOUNTER (OUTPATIENT)
Dept: RADIOLOGY | Facility: HOSPITAL | Age: 72
Discharge: HOME | End: 2022-09-23
Payer: COMMERCIAL

## 2022-09-23 ENCOUNTER — HOSPITAL ENCOUNTER (OUTPATIENT)
Dept: NON INVASIVE DIAGNOSTICS | Facility: CLINIC | Age: 72
Discharge: HOME | End: 2022-09-23
Payer: COMMERCIAL

## 2022-09-23 DIAGNOSIS — R73.03 PREDIABETES: ICD-10-CM

## 2022-09-23 DIAGNOSIS — E78.2 MIXED HYPERLIPIDEMIA: ICD-10-CM

## 2022-09-23 DIAGNOSIS — I71.21 ASCENDING AORTIC ANEURYSM: ICD-10-CM

## 2022-09-23 DIAGNOSIS — I71.20 THORACIC AORTIC ANEURYSM, WITHOUT RUPTURE: ICD-10-CM

## 2022-09-23 DIAGNOSIS — I10 ESSENTIAL HYPERTENSION, MALIGNANT: ICD-10-CM

## 2022-09-23 DIAGNOSIS — I35.0 NODULAR CALCIFIC AORTIC VALVE STENOSIS: ICD-10-CM

## 2022-09-23 DIAGNOSIS — I35.0 NONRHEUMATIC AORTIC VALVE STENOSIS: ICD-10-CM

## 2022-09-23 DIAGNOSIS — Z13.6 ENCOUNTER FOR SCREENING FOR STENOSIS OF CAROTID ARTERY: ICD-10-CM

## 2022-09-23 DIAGNOSIS — E78.5 HYPERLIPIDEMIA, UNSPECIFIED HYPERLIPIDEMIA TYPE: ICD-10-CM

## 2022-09-23 DIAGNOSIS — I35.1 NONRHEUMATIC AORTIC VALVE INSUFFICIENCY: ICD-10-CM

## 2022-09-23 DIAGNOSIS — R73.03 PRE-DIABETES: ICD-10-CM

## 2022-09-23 DIAGNOSIS — E55.9 VITAMIN D DEFICIENCY: ICD-10-CM

## 2022-09-23 DIAGNOSIS — Z12.5 SCREENING FOR PROSTATE CANCER: ICD-10-CM

## 2022-09-23 LAB
25(OH)D3 SERPL-MCNC: 33.4 NG/ML (ref 30–100)
ABO GROUP BLD: NORMAL
ALBUMIN SERPL BCP-MCNC: 3.7 G/DL (ref 3.5–5)
ALP SERPL-CCNC: 39 U/L (ref 46–116)
ALT SERPL W P-5'-P-CCNC: 21 U/L (ref 12–78)
ANION GAP SERPL CALCULATED.3IONS-SCNC: 4 MMOL/L (ref 4–13)
AST SERPL W P-5'-P-CCNC: 10 U/L (ref 5–45)
ATRIAL RATE: 86 BPM
BILIRUB SERPL-MCNC: 0.57 MG/DL (ref 0.2–1)
BLD GP AB SCN SERPL QL: NEGATIVE
BUN SERPL-MCNC: 15 MG/DL (ref 5–25)
CALCIUM SERPL-MCNC: 9.7 MG/DL (ref 8.3–10.1)
CHLORIDE SERPL-SCNC: 103 MMOL/L (ref 96–108)
CHOLEST SERPL-MCNC: 238 MG/DL
CO2 SERPL-SCNC: 28 MMOL/L (ref 21–32)
CREAT SERPL-MCNC: 0.79 MG/DL (ref 0.6–1.3)
ERYTHROCYTE [DISTWIDTH] IN BLOOD BY AUTOMATED COUNT: 12.5 % (ref 11.6–15.1)
EST. AVERAGE GLUCOSE BLD GHB EST-MCNC: 117 MG/DL
GFR SERPL CREATININE-BSD FRML MDRD: 88 ML/MIN/1.73SQ M
GLUCOSE P FAST SERPL-MCNC: 110 MG/DL (ref 65–99)
HBA1C MFR BLD: 5.7 %
HCT VFR BLD AUTO: 46.8 % (ref 36.5–49.3)
HDLC SERPL-MCNC: 75 MG/DL
HGB BLD-MCNC: 15.6 G/DL (ref 12–17)
INR PPP: 0.97 (ref 0.84–1.19)
LDLC SERPL CALC-MCNC: 148 MG/DL (ref 0–100)
MCH RBC QN AUTO: 31.8 PG (ref 26.8–34.3)
MCHC RBC AUTO-ENTMCNC: 33.3 G/DL (ref 31.4–37.4)
MCV RBC AUTO: 96 FL (ref 82–98)
NONHDLC SERPL-MCNC: 163 MG/DL
P AXIS: 37 DEGREES
PLATELET # BLD AUTO: 202 THOUSANDS/UL (ref 149–390)
PMV BLD AUTO: 10.9 FL (ref 8.9–12.7)
POTASSIUM SERPL-SCNC: 4.3 MMOL/L (ref 3.5–5.3)
PR INTERVAL: 168 MS
PROT SERPL-MCNC: 7.7 G/DL (ref 6.4–8.4)
PROTHROMBIN TIME: 13.1 SECONDS (ref 11.6–14.5)
PSA SERPL-MCNC: 1.4 NG/ML (ref 0–4)
QRS AXIS: -17 DEGREES
QRSD INTERVAL: 92 MS
QT INTERVAL: 390 MS
QTC INTERVAL: 466 MS
RBC # BLD AUTO: 4.9 MILLION/UL (ref 3.88–5.62)
RH BLD: POSITIVE
SODIUM SERPL-SCNC: 135 MMOL/L (ref 135–147)
SPECIMEN EXPIRATION DATE: NORMAL
T WAVE AXIS: 86 DEGREES
TRIGL SERPL-MCNC: 76 MG/DL
VENTRICULAR RATE: 86 BPM
WBC # BLD AUTO: 7.43 THOUSAND/UL (ref 4.31–10.16)

## 2022-09-23 PROCEDURE — 86900 BLOOD TYPING SEROLOGIC ABO: CPT | Performed by: NURSE PRACTITIONER

## 2022-09-23 PROCEDURE — 85610 PROTHROMBIN TIME: CPT

## 2022-09-23 PROCEDURE — 86901 BLOOD TYPING SEROLOGIC RH(D): CPT | Performed by: NURSE PRACTITIONER

## 2022-09-23 PROCEDURE — 93010 ELECTROCARDIOGRAM REPORT: CPT | Performed by: INTERNAL MEDICINE

## 2022-09-23 PROCEDURE — G0103 PSA SCREENING: HCPCS

## 2022-09-23 PROCEDURE — 93880 EXTRACRANIAL BILAT STUDY: CPT

## 2022-09-23 PROCEDURE — 82306 VITAMIN D 25 HYDROXY: CPT

## 2022-09-23 PROCEDURE — 80061 LIPID PANEL: CPT

## 2022-09-23 PROCEDURE — 36415 COLL VENOUS BLD VENIPUNCTURE: CPT

## 2022-09-23 PROCEDURE — 86850 RBC ANTIBODY SCREEN: CPT | Performed by: NURSE PRACTITIONER

## 2022-09-23 PROCEDURE — 85027 COMPLETE CBC AUTOMATED: CPT

## 2022-09-23 PROCEDURE — 80053 COMPREHEN METABOLIC PANEL: CPT

## 2022-09-23 PROCEDURE — 87081 CULTURE SCREEN ONLY: CPT

## 2022-09-23 PROCEDURE — 93005 ELECTROCARDIOGRAM TRACING: CPT

## 2022-09-23 PROCEDURE — 71275 CT ANGIOGRAPHY CHEST: CPT

## 2022-09-23 PROCEDURE — 83036 HEMOGLOBIN GLYCOSYLATED A1C: CPT

## 2022-09-23 PROCEDURE — G1004 CDSM NDSC: HCPCS

## 2022-09-23 RX ADMIN — IOHEXOL 85 ML: 350 INJECTION, SOLUTION INTRAVENOUS at 12:57

## 2022-09-24 PROCEDURE — 93880 EXTRACRANIAL BILAT STUDY: CPT | Performed by: SURGERY

## 2022-09-25 LAB — MRSA NOSE QL CULT: NORMAL

## 2022-09-30 ENCOUNTER — HOSPITAL ENCOUNTER (OUTPATIENT)
Facility: HOSPITAL | Age: 72
Setting detail: OUTPATIENT SURGERY
Discharge: HOME | End: 2022-09-30
Attending: INTERNAL MEDICINE | Admitting: INTERNAL MEDICINE
Payer: COMMERCIAL

## 2022-09-30 VITALS
TEMPERATURE: 98 F | SYSTOLIC BLOOD PRESSURE: 146 MMHG | OXYGEN SATURATION: 94 % | WEIGHT: 179.9 LBS | HEART RATE: 95 BPM | HEIGHT: 71 IN | BODY MASS INDEX: 25.19 KG/M2 | DIASTOLIC BLOOD PRESSURE: 70 MMHG | RESPIRATION RATE: 16 BRPM

## 2022-09-30 DIAGNOSIS — I35.0 NONRHEUMATIC AORTIC VALVE STENOSIS: ICD-10-CM

## 2022-09-30 LAB
ANION GAP SERPL CALCULATED.3IONS-SCNC: 5 MMOL/L (ref 4–13)
ATRIAL RATE: 83 BPM
BUN SERPL-MCNC: 14 MG/DL (ref 5–25)
CALCIUM SERPL-MCNC: 8.4 MG/DL (ref 8.3–10.1)
CHLORIDE SERPL-SCNC: 108 MMOL/L (ref 96–108)
CO2 SERPL-SCNC: 24 MMOL/L (ref 21–32)
CREAT SERPL-MCNC: 0.88 MG/DL (ref 0.6–1.3)
GFR SERPL CREATININE-BSD FRML MDRD: 84 ML/MIN/1.73SQ M
GLUCOSE P FAST SERPL-MCNC: 111 MG/DL (ref 65–99)
GLUCOSE SERPL-MCNC: 111 MG/DL (ref 65–140)
P AXIS: 69 DEGREES
POTASSIUM SERPL-SCNC: 4.3 MMOL/L (ref 3.5–5.3)
PR INTERVAL: 224 MS
QRS AXIS: -24 DEGREES
QRSD INTERVAL: 112 MS
QT INTERVAL: 398 MS
QTC INTERVAL: 467 MS
SODIUM SERPL-SCNC: 137 MMOL/L (ref 135–147)
T WAVE AXIS: 81 DEGREES
VENTRICULAR RATE: 83 BPM

## 2022-09-30 PROCEDURE — 93454 CORONARY ARTERY ANGIO S&I: CPT | Performed by: INTERNAL MEDICINE

## 2022-09-30 PROCEDURE — 99152 MOD SED SAME PHYS/QHP 5/>YRS: CPT | Performed by: INTERNAL MEDICINE

## 2022-09-30 PROCEDURE — C1769 GUIDE WIRE: HCPCS | Performed by: INTERNAL MEDICINE

## 2022-09-30 PROCEDURE — 93010 ELECTROCARDIOGRAM REPORT: CPT | Performed by: INTERNAL MEDICINE

## 2022-09-30 PROCEDURE — 93005 ELECTROCARDIOGRAM TRACING: CPT

## 2022-09-30 PROCEDURE — C1887 CATHETER, GUIDING: HCPCS | Performed by: INTERNAL MEDICINE

## 2022-09-30 PROCEDURE — 99153 MOD SED SAME PHYS/QHP EA: CPT | Performed by: INTERNAL MEDICINE

## 2022-09-30 PROCEDURE — C1894 INTRO/SHEATH, NON-LASER: HCPCS | Performed by: INTERNAL MEDICINE

## 2022-09-30 PROCEDURE — 80048 BASIC METABOLIC PNL TOTAL CA: CPT | Performed by: INTERNAL MEDICINE

## 2022-09-30 RX ORDER — MIDAZOLAM HYDROCHLORIDE 2 MG/2ML
INJECTION, SOLUTION INTRAMUSCULAR; INTRAVENOUS AS NEEDED
Status: DISCONTINUED | OUTPATIENT
Start: 2022-09-30 | End: 2022-09-30 | Stop reason: HOSPADM

## 2022-09-30 RX ORDER — FENTANYL CITRATE 50 UG/ML
INJECTION, SOLUTION INTRAMUSCULAR; INTRAVENOUS AS NEEDED
Status: DISCONTINUED | OUTPATIENT
Start: 2022-09-30 | End: 2022-09-30 | Stop reason: HOSPADM

## 2022-09-30 RX ORDER — ONDANSETRON 2 MG/ML
4 INJECTION INTRAMUSCULAR; INTRAVENOUS EVERY 6 HOURS PRN
Status: DISCONTINUED | OUTPATIENT
Start: 2022-09-30 | End: 2022-09-30 | Stop reason: HOSPADM

## 2022-09-30 RX ORDER — HEPARIN SODIUM 1000 [USP'U]/ML
INJECTION, SOLUTION INTRAVENOUS; SUBCUTANEOUS AS NEEDED
Status: DISCONTINUED | OUTPATIENT
Start: 2022-09-30 | End: 2022-09-30 | Stop reason: HOSPADM

## 2022-09-30 RX ORDER — NITROGLYCERIN 20 MG/100ML
INJECTION INTRAVENOUS AS NEEDED
Status: DISCONTINUED | OUTPATIENT
Start: 2022-09-30 | End: 2022-09-30 | Stop reason: HOSPADM

## 2022-09-30 RX ORDER — ASPIRIN 81 MG/1
TABLET, CHEWABLE ORAL
Status: DISCONTINUED
Start: 2022-09-30 | End: 2022-09-30 | Stop reason: HOSPADM

## 2022-09-30 RX ORDER — SODIUM CHLORIDE 9 MG/ML
125 INJECTION, SOLUTION INTRAVENOUS CONTINUOUS
Status: DISCONTINUED | OUTPATIENT
Start: 2022-09-30 | End: 2022-09-30 | Stop reason: HOSPADM

## 2022-09-30 RX ORDER — ASPIRIN 81 MG/1
324 TABLET, CHEWABLE ORAL ONCE
Status: COMPLETED | OUTPATIENT
Start: 2022-09-30 | End: 2022-09-30

## 2022-09-30 RX ORDER — ACETAMINOPHEN 325 MG/1
650 TABLET ORAL EVERY 4 HOURS PRN
Status: DISCONTINUED | OUTPATIENT
Start: 2022-09-30 | End: 2022-09-30 | Stop reason: HOSPADM

## 2022-09-30 RX ORDER — VERAPAMIL HCL 2.5 MG/ML
AMPUL (ML) INTRAVENOUS AS NEEDED
Status: DISCONTINUED | OUTPATIENT
Start: 2022-09-30 | End: 2022-09-30 | Stop reason: HOSPADM

## 2022-09-30 RX ORDER — LIDOCAINE HYDROCHLORIDE 10 MG/ML
INJECTION, SOLUTION EPIDURAL; INFILTRATION; INTRACAUDAL; PERINEURAL AS NEEDED
Status: DISCONTINUED | OUTPATIENT
Start: 2022-09-30 | End: 2022-09-30 | Stop reason: HOSPADM

## 2022-09-30 RX ORDER — SODIUM CHLORIDE 9 MG/ML
100 INJECTION, SOLUTION INTRAVENOUS CONTINUOUS
Status: DISCONTINUED | OUTPATIENT
Start: 2022-09-30 | End: 2022-09-30 | Stop reason: HOSPADM

## 2022-09-30 RX ADMIN — SODIUM CHLORIDE 125 ML/HR: 0.9 INJECTION, SOLUTION INTRAVENOUS at 07:51

## 2022-09-30 RX ADMIN — ASPIRIN 81 MG CHEWABLE TABLET 243 MG: 81 TABLET CHEWABLE at 07:44

## 2022-09-30 NOTE — INTERVAL H&P NOTE
H&P reviewed  After examining the patient I find no changes in the patients condition since the H&P had been written      Vitals:    09/30/22 0808   BP: 141/80   Pulse: 80   Resp: 18   Temp: 97 9 °F (36 6 °C)   SpO2: 97%       For pre-SAVR +/- 4 6 cm ascending aorta intervention (NIKIA 0 85 cm2, 49 mmHg, 4 3 m/s, DI 0 3)      ECG 09/23/22      Katelyn Ode / 09/30/22 / 8:46 AM

## 2022-09-30 NOTE — DISCHARGE INSTRUCTIONS
1  Please see the post cardiac catheterization dishcarge instructions  No heavy lifting, greater than 10 lbs  or strenuous  activity for 48 hrs  2 Remove band aid tomorrow  Shower and wash area- wrist gently with soap and water- beginning tomorrow  Rinse and pat dry  Apply new water seal band aid  Repeat this process for 5 days  No powders, creams lotions or antibiotic ointments  for 5 days  No tub baths, hot tubs or swimming for 5 days  3  Please call our office ([phone]) if you have any fever, redness, swelling, discharge from your wrist access site      4 No driving for 1 day

## 2022-10-03 ENCOUNTER — ANESTHESIA EVENT (OUTPATIENT)
Dept: PERIOP | Facility: HOSPITAL | Age: 72
DRG: 219 | End: 2022-10-03
Payer: COMMERCIAL

## 2022-10-03 RX ORDER — HEPARIN SODIUM 1000 [USP'U]/ML
10000 INJECTION, SOLUTION INTRAVENOUS; SUBCUTANEOUS ONCE
Status: CANCELLED | OUTPATIENT
Start: 2022-10-04

## 2022-10-03 RX ORDER — HEPARIN SODIUM 1000 [USP'U]/ML
400 INJECTION, SOLUTION INTRAVENOUS; SUBCUTANEOUS ONCE
Status: CANCELLED | OUTPATIENT
Start: 2022-10-04

## 2022-10-03 NOTE — ANESTHESIA PREPROCEDURE EVALUATION
Procedure:  REPLACEMENT VALVE AORTIC (AVR) (N/A Chest)  CABG X 1, LIMA TO LAD (N/A Chest)    Relevant Problems   CARDIO   (+) Ascending aortic aneurysm   (+) Hyperlipidemia   (+) Nonrheumatic aortic valve insufficiency   (+) Nonrheumatic aortic valve stenosis      Bicuspid AV, sev AS; CTA (4 6 cm Asc Ao)  HTN, HLD, pre-DM    TTE: normal biventricular systolic function, DD1, sev AS (mean 49 mmHg), functionally bicuspid AV, mild MR, mild TR    LHC: mLAD 80    Cr 0 88, hgb 15 6, plt 202  a1c 5 7       Anesthesia Plan  ASA Score- 4     Anesthesia Type- general with ASA Monitors  Additional Monitors: pulmonary artery catheter, central venous line and arterial line  Airway Plan: ETT  Comment:   General anesthesia, endotracheal Intubation  Standard ASA monitors  Large bore peripheral IV  Pre-induction arterial line  CVP (Triple Lumen) and Cordis with PAC  YOBANY for perioperative monitoring and diagnosis  Post-op ICU/vent  Risks and benefits discussed with patient; patient consented and agrees to proceed          Plan Factors-    Chart reviewed  Existing labs reviewed  Induction- intravenous  Postoperative Plan-     Informed Consent- Anesthetic plan and risks discussed with patient

## 2022-10-04 ENCOUNTER — APPOINTMENT (OUTPATIENT)
Dept: NON INVASIVE DIAGNOSTICS | Facility: HOSPITAL | Age: 72
DRG: 219 | End: 2022-10-04
Attending: THORACIC SURGERY (CARDIOTHORACIC VASCULAR SURGERY)
Payer: COMMERCIAL

## 2022-10-04 ENCOUNTER — APPOINTMENT (OUTPATIENT)
Dept: RADIOLOGY | Facility: HOSPITAL | Age: 72
DRG: 219 | End: 2022-10-04
Payer: COMMERCIAL

## 2022-10-04 ENCOUNTER — HOSPITAL ENCOUNTER (INPATIENT)
Facility: HOSPITAL | Age: 72
LOS: 6 days | Discharge: HOME HEALTH | DRG: 219 | End: 2022-10-10
Attending: THORACIC SURGERY (CARDIOTHORACIC VASCULAR SURGERY) | Admitting: THORACIC SURGERY (CARDIOTHORACIC VASCULAR SURGERY)
Payer: COMMERCIAL

## 2022-10-04 ENCOUNTER — ANESTHESIA (OUTPATIENT)
Dept: PERIOP | Facility: HOSPITAL | Age: 72
DRG: 219 | End: 2022-10-04
Payer: COMMERCIAL

## 2022-10-04 DIAGNOSIS — I97.89 POSTOPERATIVE ATRIAL FIBRILLATION (HCC): ICD-10-CM

## 2022-10-04 DIAGNOSIS — Q23.1 BICUSPID AORTIC VALVE: Primary | ICD-10-CM

## 2022-10-04 DIAGNOSIS — Z95.2 S/P AVR (AORTIC VALVE REPLACEMENT): ICD-10-CM

## 2022-10-04 DIAGNOSIS — I48.91 POSTOPERATIVE ATRIAL FIBRILLATION (HCC): ICD-10-CM

## 2022-10-04 DIAGNOSIS — I35.1 NONRHEUMATIC AORTIC VALVE INSUFFICIENCY: ICD-10-CM

## 2022-10-04 DIAGNOSIS — Z95.1 S/P CABG X 1: ICD-10-CM

## 2022-10-04 DIAGNOSIS — I35.9 NONRHEUMATIC AORTIC VALVE DISORDER: ICD-10-CM

## 2022-10-04 DIAGNOSIS — I35.0 NONRHEUMATIC AORTIC VALVE STENOSIS: ICD-10-CM

## 2022-10-04 PROBLEM — I25.10 CORONARY ARTERY DISEASE: Status: ACTIVE | Noted: 2022-10-04

## 2022-10-04 LAB
ABO GROUP BLD: NORMAL
ANION GAP SERPL CALCULATED.3IONS-SCNC: 5 MMOL/L (ref 4–13)
APTT PPP: 54 SECONDS (ref 23–37)
BACTERIA UR QL AUTO: ABNORMAL /HPF
BASE EXCESS BLDA CALC-SCNC: -1 MMOL/L (ref -2–3)
BASE EXCESS BLDA CALC-SCNC: -1 MMOL/L (ref -2–3)
BASE EXCESS BLDA CALC-SCNC: -2 MMOL/L (ref -2–3)
BASE EXCESS BLDA CALC-SCNC: -2 MMOL/L (ref -2–3)
BASE EXCESS BLDA CALC-SCNC: 0 MMOL/L (ref -2–3)
BASE EXCESS BLDA CALC-SCNC: 1 MMOL/L (ref -2–3)
BASE EXCESS BLDA CALC-SCNC: 2 MMOL/L (ref -2–3)
BASE EXCESS BLDA CALC-SCNC: 2 MMOL/L (ref -2–3)
BASE EXCESS BLDA CALC-SCNC: 3 MMOL/L (ref -2–3)
BILIRUB UR QL STRIP: NEGATIVE
BUN SERPL-MCNC: 12 MG/DL (ref 5–25)
CA-I BLD-SCNC: 0.93 MMOL/L (ref 1.12–1.32)
CA-I BLD-SCNC: 0.99 MMOL/L (ref 1.12–1.32)
CA-I BLD-SCNC: 1.01 MMOL/L (ref 1.12–1.32)
CA-I BLD-SCNC: 1.16 MMOL/L (ref 1.12–1.32)
CA-I BLD-SCNC: 1.18 MMOL/L (ref 1.12–1.32)
CA-I BLD-SCNC: 1.2 MMOL/L (ref 1.12–1.32)
CA-I BLD-SCNC: 1.32 MMOL/L (ref 1.12–1.32)
CALCIUM SERPL-MCNC: 8.6 MG/DL (ref 8.3–10.1)
CHLORIDE SERPL-SCNC: 112 MMOL/L (ref 96–108)
CLARITY UR: CLEAR
CO2 SERPL-SCNC: 22 MMOL/L (ref 21–32)
COLOR UR: ABNORMAL
CREAT SERPL-MCNC: 0.76 MG/DL (ref 0.6–1.3)
ERYTHROCYTE [DISTWIDTH] IN BLOOD BY AUTOMATED COUNT: 12.7 % (ref 11.6–15.1)
FIBRINOGEN PPP-MCNC: 166 MG/DL (ref 227–495)
FIBRINOGEN PPP-MCNC: 279 MG/DL (ref 227–495)
FIO2 GAS DIL.REBREATH: 60 L
GFR SERPL CREATININE-BSD FRML MDRD: 89 ML/MIN/1.73SQ M
GLUCOSE SERPL-MCNC: 120 MG/DL (ref 65–140)
GLUCOSE SERPL-MCNC: 130 MG/DL (ref 65–140)
GLUCOSE SERPL-MCNC: 132 MG/DL (ref 65–140)
GLUCOSE SERPL-MCNC: 133 MG/DL (ref 65–140)
GLUCOSE SERPL-MCNC: 135 MG/DL (ref 65–140)
GLUCOSE SERPL-MCNC: 136 MG/DL (ref 65–140)
GLUCOSE SERPL-MCNC: 139 MG/DL (ref 65–140)
GLUCOSE SERPL-MCNC: 140 MG/DL (ref 65–140)
GLUCOSE SERPL-MCNC: 146 MG/DL (ref 65–140)
GLUCOSE SERPL-MCNC: 151 MG/DL (ref 65–140)
GLUCOSE SERPL-MCNC: 153 MG/DL (ref 65–140)
GLUCOSE SERPL-MCNC: 154 MG/DL (ref 65–140)
GLUCOSE SERPL-MCNC: 170 MG/DL (ref 65–140)
GLUCOSE SERPL-MCNC: 187 MG/DL (ref 65–140)
GLUCOSE SERPL-MCNC: 195 MG/DL (ref 65–140)
GLUCOSE SERPL-MCNC: 200 MG/DL (ref 65–140)
GLUCOSE UR STRIP-MCNC: NEGATIVE MG/DL
HCO3 BLDA-SCNC: 23.1 MMOL/L (ref 22–28)
HCO3 BLDA-SCNC: 23.8 MMOL/L (ref 22–28)
HCO3 BLDA-SCNC: 24.1 MMOL/L (ref 24–30)
HCO3 BLDA-SCNC: 25.5 MMOL/L (ref 22–28)
HCO3 BLDA-SCNC: 25.7 MMOL/L (ref 22–28)
HCO3 BLDA-SCNC: 25.8 MMOL/L (ref 22–28)
HCO3 BLDA-SCNC: 27.8 MMOL/L (ref 22–28)
HCO3 BLDA-SCNC: 28.4 MMOL/L (ref 22–28)
HCO3 BLDA-SCNC: 29 MMOL/L (ref 22–28)
HCT VFR BLD AUTO: 32.5 % (ref 36.5–49.3)
HCT VFR BLD AUTO: 32.5 % (ref 36.5–49.3)
HCT VFR BLD CALC: 26 % (ref 36.5–49.3)
HCT VFR BLD CALC: 27 % (ref 36.5–49.3)
HCT VFR BLD CALC: 28 % (ref 36.5–49.3)
HCT VFR BLD CALC: 29 % (ref 36.5–49.3)
HCT VFR BLD CALC: 30 % (ref 36.5–49.3)
HCT VFR BLD CALC: 35 % (ref 36.5–49.3)
HCT VFR BLD CALC: 38 % (ref 36.5–49.3)
HGB BLD-MCNC: 10.5 G/DL (ref 12–17)
HGB BLD-MCNC: 10.9 G/DL (ref 12–17)
HGB BLDA-MCNC: 10.2 G/DL (ref 12–17)
HGB BLDA-MCNC: 11.9 G/DL (ref 12–17)
HGB BLDA-MCNC: 12.9 G/DL (ref 12–17)
HGB BLDA-MCNC: 8.8 G/DL (ref 12–17)
HGB BLDA-MCNC: 9.2 G/DL (ref 12–17)
HGB BLDA-MCNC: 9.5 G/DL (ref 12–17)
HGB BLDA-MCNC: 9.9 G/DL (ref 12–17)
HGB UR QL STRIP.AUTO: ABNORMAL
INR PPP: 1.23 (ref 0.84–1.19)
INR PPP: 1.47 (ref 0.84–1.19)
KCT BLD-ACNC: 110 SEC (ref 89–137)
KCT BLD-ACNC: 134 SEC (ref 89–137)
KCT BLD-ACNC: 460 SEC (ref 89–137)
KCT BLD-ACNC: 467 SEC (ref 89–137)
KCT BLD-ACNC: 580 SEC (ref 89–137)
KCT BLD-ACNC: 586 SEC (ref 89–137)
KCT BLD-ACNC: 646 SEC (ref 89–137)
KCT BLD-ACNC: 762 SEC (ref 89–137)
KCT BLD-ACNC: 855 SEC (ref 89–137)
KETONES UR STRIP-MCNC: NEGATIVE MG/DL
LEUKOCYTE ESTERASE UR QL STRIP: NEGATIVE
MCH RBC QN AUTO: 31.3 PG (ref 26.8–34.3)
MCHC RBC AUTO-ENTMCNC: 32.3 G/DL (ref 31.4–37.4)
MCV RBC AUTO: 97 FL (ref 82–98)
MUCOUS THREADS UR QL AUTO: ABNORMAL
NITRITE UR QL STRIP: NEGATIVE
NON-SQ EPI CELLS URNS QL MICRO: ABNORMAL /HPF
PCO2 BLD: 24 MMOL/L (ref 21–32)
PCO2 BLD: 25 MMOL/L (ref 21–32)
PCO2 BLD: 25 MMOL/L (ref 21–32)
PCO2 BLD: 27 MMOL/L (ref 21–32)
PCO2 BLD: 29 MMOL/L (ref 21–32)
PCO2 BLD: 30 MMOL/L (ref 21–32)
PCO2 BLD: 30 MMOL/L (ref 21–32)
PCO2 BLD: 40.4 MM HG (ref 36–44)
PCO2 BLD: 40.6 MM HG (ref 36–44)
PCO2 BLD: 42.5 MM HG (ref 36–44)
PCO2 BLD: 44.7 MM HG (ref 42–50)
PCO2 BLD: 45.4 MM HG (ref 36–44)
PCO2 BLD: 49.4 MM HG (ref 36–44)
PCO2 BLD: 50.6 MM HG (ref 36–44)
PCO2 BLD: 50.9 MM HG (ref 36–44)
PCO2 BLD: 51.7 MM HG (ref 36–44)
PH BLD: 7.32 [PH] (ref 7.35–7.45)
PH BLD: 7.34 [PH] (ref 7.3–7.4)
PH BLD: 7.35 [PH] (ref 7.35–7.45)
PH BLD: 7.35 [PH] (ref 7.35–7.45)
PH BLD: 7.36 [PH] (ref 7.35–7.45)
PH BLD: 7.36 [PH] (ref 7.35–7.45)
PH BLD: 7.37 [PH] (ref 7.35–7.45)
PH BLD: 7.38 [PH] (ref 7.35–7.45)
PH BLD: 7.39 [PH] (ref 7.35–7.45)
PH UR STRIP.AUTO: 7 [PH]
PLATELET # BLD AUTO: 113 THOUSANDS/UL (ref 149–390)
PLATELET # BLD AUTO: 126 THOUSANDS/UL (ref 149–390)
PLATELET # BLD AUTO: 134 THOUSANDS/UL (ref 149–390)
PLATELET # BLD AUTO: 92 THOUSANDS/UL (ref 149–390)
PMV BLD AUTO: 10.4 FL (ref 8.9–12.7)
PMV BLD AUTO: 10.6 FL (ref 8.9–12.7)
PMV BLD AUTO: 10.9 FL (ref 8.9–12.7)
PMV BLD AUTO: 11.3 FL (ref 8.9–12.7)
PO2 BLD: 156 MM HG (ref 75–129)
PO2 BLD: 164 MM HG (ref 75–129)
PO2 BLD: 164 MM HG (ref 75–129)
PO2 BLD: 200 MM HG (ref 75–129)
PO2 BLD: 310 MM HG (ref 75–129)
PO2 BLD: 342 MM HG (ref 75–129)
PO2 BLD: 368 MM HG (ref 75–129)
PO2 BLD: 51 MM HG (ref 35–45)
PO2 BLD: >400 MM HG (ref 75–129)
POTASSIUM BLD-SCNC: 3.8 MMOL/L (ref 3.5–5.3)
POTASSIUM BLD-SCNC: 4.1 MMOL/L (ref 3.5–5.3)
POTASSIUM BLD-SCNC: 4.1 MMOL/L (ref 3.5–5.3)
POTASSIUM BLD-SCNC: 4.2 MMOL/L (ref 3.5–5.3)
POTASSIUM BLD-SCNC: 4.5 MMOL/L (ref 3.5–5.3)
POTASSIUM BLD-SCNC: 4.5 MMOL/L (ref 3.5–5.3)
POTASSIUM BLD-SCNC: 4.6 MMOL/L (ref 3.5–5.3)
POTASSIUM BLD-SCNC: 4.7 MMOL/L (ref 3.5–5.3)
POTASSIUM BLD-SCNC: 4.7 MMOL/L (ref 3.5–5.3)
POTASSIUM SERPL-SCNC: 4 MMOL/L (ref 3.5–5.3)
POTASSIUM SERPL-SCNC: 4.2 MMOL/L (ref 3.5–5.3)
PROT UR STRIP-MCNC: NEGATIVE MG/DL
PROTHROMBIN TIME: 15.7 SECONDS (ref 11.6–14.5)
PROTHROMBIN TIME: 18.1 SECONDS (ref 11.6–14.5)
RBC # BLD AUTO: 3.35 MILLION/UL (ref 3.88–5.62)
RBC #/AREA URNS AUTO: ABNORMAL /HPF
RH BLD: POSITIVE
SAO2 % BLD FROM PO2: 100 % (ref 60–85)
SAO2 % BLD FROM PO2: 84 % (ref 60–85)
SAO2 % BLD FROM PO2: 99 % (ref 60–85)
SODIUM BLD-SCNC: 135 MMOL/L (ref 136–145)
SODIUM BLD-SCNC: 136 MMOL/L (ref 136–145)
SODIUM BLD-SCNC: 138 MMOL/L (ref 136–145)
SODIUM BLD-SCNC: 138 MMOL/L (ref 136–145)
SODIUM BLD-SCNC: 139 MMOL/L (ref 136–145)
SODIUM SERPL-SCNC: 139 MMOL/L (ref 135–147)
SP GR UR STRIP.AUTO: 1.02 (ref 1–1.03)
SPECIMEN SOURCE: ABNORMAL
SPECIMEN SOURCE: NORMAL
SPECIMEN SOURCE: NORMAL
UROBILINOGEN UR STRIP-ACNC: <2 MG/DL
WBC # BLD AUTO: 14.9 THOUSAND/UL (ref 4.31–10.16)
WBC #/AREA URNS AUTO: ABNORMAL /HPF

## 2022-10-04 PROCEDURE — 99291 CRITICAL CARE FIRST HOUR: CPT | Performed by: STUDENT IN AN ORGANIZED HEALTH CARE EDUCATION/TRAINING PROGRAM

## 2022-10-04 PROCEDURE — 94760 N-INVAS EAR/PLS OXIMETRY 1: CPT

## 2022-10-04 PROCEDURE — 5A1221Z PERFORMANCE OF CARDIAC OUTPUT, CONTINUOUS: ICD-10-PCS | Performed by: THORACIC SURGERY (CARDIOTHORACIC VASCULAR SURGERY)

## 2022-10-04 PROCEDURE — 02RF08Z REPLACEMENT OF AORTIC VALVE WITH ZOOPLASTIC TISSUE, OPEN APPROACH: ICD-10-PCS | Performed by: THORACIC SURGERY (CARDIOTHORACIC VASCULAR SURGERY)

## 2022-10-04 PROCEDURE — 84132 ASSAY OF SERUM POTASSIUM: CPT

## 2022-10-04 PROCEDURE — 85049 AUTOMATED PLATELET COUNT: CPT | Performed by: PHYSICIAN ASSISTANT

## 2022-10-04 PROCEDURE — 93005 ELECTROCARDIOGRAM TRACING: CPT

## 2022-10-04 PROCEDURE — 84132 ASSAY OF SERUM POTASSIUM: CPT | Performed by: PHYSICIAN ASSISTANT

## 2022-10-04 PROCEDURE — 02100Z9 BYPASS CORONARY ARTERY, ONE ARTERY FROM LEFT INTERNAL MAMMARY, OPEN APPROACH: ICD-10-PCS | Performed by: THORACIC SURGERY (CARDIOTHORACIC VASCULAR SURGERY)

## 2022-10-04 PROCEDURE — 85384 FIBRINOGEN ACTIVITY: CPT | Performed by: PHYSICIAN ASSISTANT

## 2022-10-04 PROCEDURE — 85014 HEMATOCRIT: CPT

## 2022-10-04 PROCEDURE — 30233M1 TRANSFUSION OF NONAUTOLOGOUS PLASMA CRYOPRECIPITATE INTO PERIPHERAL VEIN, PERCUTANEOUS APPROACH: ICD-10-PCS | Performed by: THORACIC SURGERY (CARDIOTHORACIC VASCULAR SURGERY)

## 2022-10-04 PROCEDURE — 30233N0 TRANSFUSION OF AUTOLOGOUS RED BLOOD CELLS INTO PERIPHERAL VEIN, PERCUTANEOUS APPROACH: ICD-10-PCS | Performed by: THORACIC SURGERY (CARDIOTHORACIC VASCULAR SURGERY)

## 2022-10-04 PROCEDURE — C1781 MESH (IMPLANTABLE): HCPCS | Performed by: THORACIC SURGERY (CARDIOTHORACIC VASCULAR SURGERY)

## 2022-10-04 PROCEDURE — 93355 ECHO TRANSESOPHAGEAL (TEE): CPT

## 2022-10-04 PROCEDURE — P9017 PLASMA 1 DONOR FRZ W/IN 8 HR: HCPCS

## 2022-10-04 PROCEDURE — 85014 HEMATOCRIT: CPT | Performed by: PHYSICIAN ASSISTANT

## 2022-10-04 PROCEDURE — 85027 COMPLETE CBC AUTOMATED: CPT | Performed by: PHYSICIAN ASSISTANT

## 2022-10-04 PROCEDURE — P9037 PLATE PHERES LEUKOREDU IRRAD: HCPCS

## 2022-10-04 PROCEDURE — 82948 REAGENT STRIP/BLOOD GLUCOSE: CPT

## 2022-10-04 PROCEDURE — 86920 COMPATIBILITY TEST SPIN: CPT

## 2022-10-04 PROCEDURE — 82330 ASSAY OF CALCIUM: CPT

## 2022-10-04 PROCEDURE — 94002 VENT MGMT INPAT INIT DAY: CPT

## 2022-10-04 PROCEDURE — 85610 PROTHROMBIN TIME: CPT | Performed by: PHYSICIAN ASSISTANT

## 2022-10-04 PROCEDURE — P9012 CRYOPRECIPITATE EACH UNIT: HCPCS

## 2022-10-04 PROCEDURE — 80048 BASIC METABOLIC PNL TOTAL CA: CPT | Performed by: PHYSICIAN ASSISTANT

## 2022-10-04 PROCEDURE — 88305 TISSUE EXAM BY PATHOLOGIST: CPT | Performed by: SPECIALIST

## 2022-10-04 PROCEDURE — 88313 SPECIAL STAINS GROUP 2: CPT | Performed by: SPECIALIST

## 2022-10-04 PROCEDURE — 85347 COAGULATION TIME ACTIVATED: CPT

## 2022-10-04 PROCEDURE — 85018 HEMOGLOBIN: CPT | Performed by: PHYSICIAN ASSISTANT

## 2022-10-04 PROCEDURE — 81001 URINALYSIS AUTO W/SCOPE: CPT | Performed by: NURSE PRACTITIONER

## 2022-10-04 PROCEDURE — 85049 AUTOMATED PLATELET COUNT: CPT | Performed by: THORACIC SURGERY (CARDIOTHORACIC VASCULAR SURGERY)

## 2022-10-04 PROCEDURE — 33533 CABG ARTERIAL SINGLE: CPT | Performed by: PHYSICIAN ASSISTANT

## 2022-10-04 PROCEDURE — 88311 DECALCIFY TISSUE: CPT | Performed by: SPECIALIST

## 2022-10-04 PROCEDURE — 85730 THROMBOPLASTIN TIME PARTIAL: CPT | Performed by: PHYSICIAN ASSISTANT

## 2022-10-04 PROCEDURE — 02HV33Z INSERTION OF INFUSION DEVICE INTO SUPERIOR VENA CAVA, PERCUTANEOUS APPROACH: ICD-10-PCS | Performed by: ANESTHESIOLOGY

## 2022-10-04 PROCEDURE — 30233K1 TRANSFUSION OF NONAUTOLOGOUS FROZEN PLASMA INTO PERIPHERAL VEIN, PERCUTANEOUS APPROACH: ICD-10-PCS | Performed by: THORACIC SURGERY (CARDIOTHORACIC VASCULAR SURGERY)

## 2022-10-04 PROCEDURE — 84295 ASSAY OF SERUM SODIUM: CPT

## 2022-10-04 PROCEDURE — 82803 BLOOD GASES ANY COMBINATION: CPT

## 2022-10-04 PROCEDURE — 71045 X-RAY EXAM CHEST 1 VIEW: CPT

## 2022-10-04 PROCEDURE — NC001 PR NO CHARGE: Performed by: THORACIC SURGERY (CARDIOTHORACIC VASCULAR SURGERY)

## 2022-10-04 PROCEDURE — 82947 ASSAY GLUCOSE BLOOD QUANT: CPT

## 2022-10-04 PROCEDURE — 5A1223Z PERFORMANCE OF CARDIAC PACING, CONTINUOUS: ICD-10-PCS | Performed by: THORACIC SURGERY (CARDIOTHORACIC VASCULAR SURGERY)

## 2022-10-04 PROCEDURE — 33405 REPLACEMENT AORTIC VALVE OPN: CPT | Performed by: PHYSICIAN ASSISTANT

## 2022-10-04 DEVICE — VALVE AORTIC INSPIRIS RESILIA 25MM: Type: IMPLANTABLE DEVICE | Site: HEART | Status: FUNCTIONAL

## 2022-10-04 RX ORDER — BISACODYL 10 MG
10 SUPPOSITORY, RECTAL RECTAL DAILY PRN
Status: DISCONTINUED | OUTPATIENT
Start: 2022-10-04 | End: 2022-10-10 | Stop reason: HOSPADM

## 2022-10-04 RX ORDER — CEFAZOLIN SODIUM 2 G/50ML
2000 SOLUTION INTRAVENOUS ONCE
Status: DISCONTINUED | OUTPATIENT
Start: 2022-10-04 | End: 2022-10-04 | Stop reason: HOSPADM

## 2022-10-04 RX ORDER — POTASSIUM CHLORIDE 14.9 MG/ML
20 INJECTION INTRAVENOUS ONCE AS NEEDED
Status: DISCONTINUED | OUTPATIENT
Start: 2022-10-04 | End: 2022-10-05

## 2022-10-04 RX ORDER — VANCOMYCIN HYDROCHLORIDE 1 G/20ML
INJECTION, POWDER, LYOPHILIZED, FOR SOLUTION INTRAVENOUS AS NEEDED
Status: DISCONTINUED | OUTPATIENT
Start: 2022-10-04 | End: 2022-10-04 | Stop reason: HOSPADM

## 2022-10-04 RX ORDER — VASOPRESSIN 20 U/ML
INJECTION PARENTERAL AS NEEDED
Status: DISCONTINUED | OUTPATIENT
Start: 2022-10-04 | End: 2022-10-04

## 2022-10-04 RX ORDER — POTASSIUM CHLORIDE 14.9 MG/ML
20 INJECTION INTRAVENOUS
Status: DISCONTINUED | OUTPATIENT
Start: 2022-10-04 | End: 2022-10-05

## 2022-10-04 RX ORDER — SODIUM CHLORIDE 9 MG/ML
INJECTION, SOLUTION INTRAVENOUS CONTINUOUS PRN
Status: DISCONTINUED | OUTPATIENT
Start: 2022-10-04 | End: 2022-10-04

## 2022-10-04 RX ORDER — ONDANSETRON 2 MG/ML
INJECTION INTRAMUSCULAR; INTRAVENOUS AS NEEDED
Status: DISCONTINUED | OUTPATIENT
Start: 2022-10-04 | End: 2022-10-04

## 2022-10-04 RX ORDER — CEFAZOLIN SODIUM 2 G/50ML
2000 SOLUTION INTRAVENOUS EVERY 8 HOURS
Status: COMPLETED | OUTPATIENT
Start: 2022-10-04 | End: 2022-10-05

## 2022-10-04 RX ORDER — MAGNESIUM HYDROXIDE 1200 MG/15ML
LIQUID ORAL AS NEEDED
Status: DISCONTINUED | OUTPATIENT
Start: 2022-10-04 | End: 2022-10-04 | Stop reason: HOSPADM

## 2022-10-04 RX ORDER — AMINOCAPROIC ACID 250 MG/ML
INJECTION, SOLUTION INTRAVENOUS AS NEEDED
Status: DISCONTINUED | OUTPATIENT
Start: 2022-10-04 | End: 2022-10-04

## 2022-10-04 RX ORDER — ALBUMIN, HUMAN INJ 5% 5 %
SOLUTION INTRAVENOUS CONTINUOUS PRN
Status: DISCONTINUED | OUTPATIENT
Start: 2022-10-04 | End: 2022-10-04

## 2022-10-04 RX ORDER — ROCURONIUM BROMIDE 10 MG/ML
INJECTION, SOLUTION INTRAVENOUS AS NEEDED
Status: DISCONTINUED | OUTPATIENT
Start: 2022-10-04 | End: 2022-10-04

## 2022-10-04 RX ORDER — ACETAMINOPHEN 325 MG/1
975 TABLET ORAL EVERY 8 HOURS
Status: DISCONTINUED | OUTPATIENT
Start: 2022-10-04 | End: 2022-10-10 | Stop reason: HOSPADM

## 2022-10-04 RX ORDER — FENTANYL CITRATE 50 UG/ML
INJECTION, SOLUTION INTRAMUSCULAR; INTRAVENOUS AS NEEDED
Status: DISCONTINUED | OUTPATIENT
Start: 2022-10-04 | End: 2022-10-04

## 2022-10-04 RX ORDER — HYDROMORPHONE HCL/PF 1 MG/ML
0.5 SYRINGE (ML) INJECTION EVERY 2 HOUR PRN
Status: DISCONTINUED | OUTPATIENT
Start: 2022-10-04 | End: 2022-10-05

## 2022-10-04 RX ORDER — CHLORHEXIDINE GLUCONATE 0.12 MG/ML
15 RINSE ORAL 2 TIMES DAILY
Status: DISCONTINUED | OUTPATIENT
Start: 2022-10-04 | End: 2022-10-10 | Stop reason: HOSPADM

## 2022-10-04 RX ORDER — PANTOPRAZOLE SODIUM 40 MG/1
40 TABLET, DELAYED RELEASE ORAL DAILY
Status: DISCONTINUED | OUTPATIENT
Start: 2022-10-05 | End: 2022-10-10 | Stop reason: HOSPADM

## 2022-10-04 RX ORDER — SODIUM CHLORIDE, SODIUM LACTATE, POTASSIUM CHLORIDE, CALCIUM CHLORIDE 600; 310; 30; 20 MG/100ML; MG/100ML; MG/100ML; MG/100ML
INJECTION, SOLUTION INTRAVENOUS CONTINUOUS PRN
Status: DISCONTINUED | OUTPATIENT
Start: 2022-10-04 | End: 2022-10-04

## 2022-10-04 RX ORDER — ATORVASTATIN CALCIUM 80 MG/1
80 TABLET, FILM COATED ORAL
Status: DISCONTINUED | OUTPATIENT
Start: 2022-10-04 | End: 2022-10-10 | Stop reason: HOSPADM

## 2022-10-04 RX ORDER — ALBUMIN (HUMAN) 12.5 G/50ML
12.5 SOLUTION INTRAVENOUS ONCE
Status: DISCONTINUED | OUTPATIENT
Start: 2022-10-04 | End: 2022-10-04

## 2022-10-04 RX ORDER — FENTANYL CITRATE 50 UG/ML
50 INJECTION, SOLUTION INTRAMUSCULAR; INTRAVENOUS
Status: DISCONTINUED | OUTPATIENT
Start: 2022-10-04 | End: 2022-10-05

## 2022-10-04 RX ORDER — ALBUMIN, HUMAN INJ 5% 5 %
12.5 SOLUTION INTRAVENOUS ONCE
Status: COMPLETED | OUTPATIENT
Start: 2022-10-04 | End: 2022-10-04

## 2022-10-04 RX ORDER — METOPROLOL TARTRATE 5 MG/5ML
INJECTION INTRAVENOUS AS NEEDED
Status: DISCONTINUED | OUTPATIENT
Start: 2022-10-04 | End: 2022-10-04

## 2022-10-04 RX ORDER — PROTAMINE SULFATE 10 MG/ML
INJECTION, SOLUTION INTRAVENOUS AS NEEDED
Status: DISCONTINUED | OUTPATIENT
Start: 2022-10-04 | End: 2022-10-04

## 2022-10-04 RX ORDER — ONDANSETRON 2 MG/ML
4 INJECTION INTRAMUSCULAR; INTRAVENOUS EVERY 6 HOURS PRN
Status: DISCONTINUED | OUTPATIENT
Start: 2022-10-04 | End: 2022-10-10 | Stop reason: HOSPADM

## 2022-10-04 RX ORDER — MAGNESIUM SULFATE HEPTAHYDRATE 40 MG/ML
2 INJECTION, SOLUTION INTRAVENOUS ONCE
Status: COMPLETED | OUTPATIENT
Start: 2022-10-04 | End: 2022-10-04

## 2022-10-04 RX ORDER — PROPOFOL 10 MG/ML
INJECTION, EMULSION INTRAVENOUS AS NEEDED
Status: DISCONTINUED | OUTPATIENT
Start: 2022-10-04 | End: 2022-10-04

## 2022-10-04 RX ORDER — CHLORHEXIDINE GLUCONATE 0.12 MG/ML
15 RINSE ORAL ONCE
Status: COMPLETED | OUTPATIENT
Start: 2022-10-04 | End: 2022-10-04

## 2022-10-04 RX ORDER — FUROSEMIDE 10 MG/ML
40 INJECTION INTRAMUSCULAR; INTRAVENOUS EVERY 6 HOURS PRN
Status: DISCONTINUED | OUTPATIENT
Start: 2022-10-04 | End: 2022-10-05

## 2022-10-04 RX ORDER — SODIUM CHLORIDE 450 MG/100ML
20 INJECTION, SOLUTION INTRAVENOUS CONTINUOUS
Status: DISCONTINUED | OUTPATIENT
Start: 2022-10-04 | End: 2022-10-05

## 2022-10-04 RX ORDER — NEOSTIGMINE METHYLSULFATE 1 MG/ML
INJECTION INTRAVENOUS AS NEEDED
Status: DISCONTINUED | OUTPATIENT
Start: 2022-10-04 | End: 2022-10-04

## 2022-10-04 RX ORDER — GLYCOPYRROLATE 0.2 MG/ML
INJECTION INTRAMUSCULAR; INTRAVENOUS AS NEEDED
Status: DISCONTINUED | OUTPATIENT
Start: 2022-10-04 | End: 2022-10-04

## 2022-10-04 RX ORDER — ASPIRIN 325 MG
325 TABLET ORAL DAILY
Status: DISCONTINUED | OUTPATIENT
Start: 2022-10-04 | End: 2022-10-06

## 2022-10-04 RX ORDER — CALCIUM CHLORIDE 100 MG/ML
INJECTION INTRAVENOUS; INTRAVENTRICULAR AS NEEDED
Status: DISCONTINUED | OUTPATIENT
Start: 2022-10-04 | End: 2022-10-04

## 2022-10-04 RX ORDER — ESMOLOL HYDROCHLORIDE 10 MG/ML
INJECTION INTRAVENOUS AS NEEDED
Status: DISCONTINUED | OUTPATIENT
Start: 2022-10-04 | End: 2022-10-04

## 2022-10-04 RX ORDER — CALCIUM CHLORIDE 100 MG/ML
1 INJECTION INTRAVENOUS; INTRAVENTRICULAR ONCE
Status: DISCONTINUED | OUTPATIENT
Start: 2022-10-04 | End: 2022-10-05

## 2022-10-04 RX ORDER — CEFAZOLIN SODIUM 1 G/3ML
INJECTION, POWDER, FOR SOLUTION INTRAMUSCULAR; INTRAVENOUS AS NEEDED
Status: DISCONTINUED | OUTPATIENT
Start: 2022-10-04 | End: 2022-10-04

## 2022-10-04 RX ORDER — POLYETHYLENE GLYCOL 3350 17 G/17G
17 POWDER, FOR SOLUTION ORAL DAILY
Status: DISCONTINUED | OUTPATIENT
Start: 2022-10-04 | End: 2022-10-10 | Stop reason: HOSPADM

## 2022-10-04 RX ORDER — HEPARIN SODIUM 1000 [USP'U]/ML
INJECTION, SOLUTION INTRAVENOUS; SUBCUTANEOUS AS NEEDED
Status: DISCONTINUED | OUTPATIENT
Start: 2022-10-04 | End: 2022-10-04

## 2022-10-04 RX ORDER — AMIODARONE HYDROCHLORIDE 200 MG/1
200 TABLET ORAL EVERY 8 HOURS SCHEDULED
Status: DISCONTINUED | OUTPATIENT
Start: 2022-10-04 | End: 2022-10-10 | Stop reason: HOSPADM

## 2022-10-04 RX ORDER — OXYCODONE HYDROCHLORIDE 5 MG/1
2.5 TABLET ORAL EVERY 4 HOURS PRN
Status: DISCONTINUED | OUTPATIENT
Start: 2022-10-04 | End: 2022-10-10 | Stop reason: HOSPADM

## 2022-10-04 RX ORDER — ACETAMINOPHEN 650 MG/1
650 SUPPOSITORY RECTAL EVERY 4 HOURS PRN
Status: DISCONTINUED | OUTPATIENT
Start: 2022-10-04 | End: 2022-10-10 | Stop reason: HOSPADM

## 2022-10-04 RX ORDER — PROPOFOL 10 MG/ML
INJECTION, EMULSION INTRAVENOUS CONTINUOUS PRN
Status: DISCONTINUED | OUTPATIENT
Start: 2022-10-04 | End: 2022-10-04

## 2022-10-04 RX ORDER — LIDOCAINE HYDROCHLORIDE 10 MG/ML
INJECTION, SOLUTION EPIDURAL; INFILTRATION; INTRACAUDAL; PERINEURAL
Status: COMPLETED | OUTPATIENT
Start: 2022-10-04 | End: 2022-10-04

## 2022-10-04 RX ORDER — MIDAZOLAM HYDROCHLORIDE 2 MG/2ML
INJECTION, SOLUTION INTRAMUSCULAR; INTRAVENOUS AS NEEDED
Status: DISCONTINUED | OUTPATIENT
Start: 2022-10-04 | End: 2022-10-04

## 2022-10-04 RX ORDER — FONDAPARINUX SODIUM 2.5 MG/.5ML
2.5 INJECTION SUBCUTANEOUS DAILY
Status: DISCONTINUED | OUTPATIENT
Start: 2022-10-05 | End: 2022-10-10

## 2022-10-04 RX ORDER — FENTANYL CITRATE 50 UG/ML
50 INJECTION, SOLUTION INTRAMUSCULAR; INTRAVENOUS ONCE
Status: DISCONTINUED | OUTPATIENT
Start: 2022-10-04 | End: 2022-10-05

## 2022-10-04 RX ORDER — OXYCODONE HYDROCHLORIDE 5 MG/1
5 TABLET ORAL EVERY 4 HOURS PRN
Status: DISCONTINUED | OUTPATIENT
Start: 2022-10-04 | End: 2022-10-10 | Stop reason: HOSPADM

## 2022-10-04 RX ADMIN — MIDAZOLAM 5 MG: 1 INJECTION INTRAMUSCULAR; INTRAVENOUS at 08:01

## 2022-10-04 RX ADMIN — ROCURONIUM BROMIDE 100 MG: 50 INJECTION, SOLUTION INTRAVENOUS at 08:01

## 2022-10-04 RX ADMIN — ALBUMIN (HUMAN) 12.5 G: 12.5 INJECTION, SOLUTION INTRAVENOUS at 21:54

## 2022-10-04 RX ADMIN — ESMOLOL HYDROCHLORIDE 30 MG: 100 INJECTION, SOLUTION INTRAVENOUS at 12:23

## 2022-10-04 RX ADMIN — SODIUM CHLORIDE 20 ML/HR: 0.45 INJECTION, SOLUTION INTRAVENOUS at 14:00

## 2022-10-04 RX ADMIN — SODIUM CHLORIDE, SODIUM LACTATE, POTASSIUM CHLORIDE, CALCIUM CHLORIDE: 600; 310; 30; 20 INJECTION, SOLUTION INTRAVENOUS at 08:22

## 2022-10-04 RX ADMIN — SODIUM CHLORIDE, SODIUM LACTATE, POTASSIUM CHLORIDE, AND CALCIUM CHLORIDE: .6; .31; .03; .02 INJECTION, SOLUTION INTRAVENOUS at 09:29

## 2022-10-04 RX ADMIN — CEFAZOLIN SODIUM 2000 MG: 2 SOLUTION INTRAVENOUS at 19:52

## 2022-10-04 RX ADMIN — PROTAMINE SULFATE 250 MG: 10 INJECTION, SOLUTION INTRAVENOUS at 12:25

## 2022-10-04 RX ADMIN — Medication 12.5 MG: at 06:31

## 2022-10-04 RX ADMIN — PROPOFOL 30 MCG/KG/MIN: 10 INJECTION, EMULSION INTRAVENOUS at 12:09

## 2022-10-04 RX ADMIN — PROPOFOL 50 MG: 10 INJECTION, EMULSION INTRAVENOUS at 12:27

## 2022-10-04 RX ADMIN — SODIUM CHLORIDE, SODIUM LACTATE, POTASSIUM CHLORIDE, AND CALCIUM CHLORIDE: .6; .31; .03; .02 INJECTION, SOLUTION INTRAVENOUS at 12:02

## 2022-10-04 RX ADMIN — AMINOCAPROIC ACID 2 G/HR: 250 INJECTION, SOLUTION INTRAVENOUS at 08:30

## 2022-10-04 RX ADMIN — CEFAZOLIN 2000 MG: 1 INJECTION, POWDER, FOR SOLUTION INTRAMUSCULAR; INTRAVENOUS at 12:20

## 2022-10-04 RX ADMIN — MIDAZOLAM 3 MG: 1 INJECTION INTRAMUSCULAR; INTRAVENOUS at 07:54

## 2022-10-04 RX ADMIN — NEOSTIGMINE METHYLSULFATE 4 MG: 1 INJECTION INTRAVENOUS at 13:12

## 2022-10-04 RX ADMIN — ROCURONIUM BROMIDE 50 MG: 50 INJECTION, SOLUTION INTRAVENOUS at 10:28

## 2022-10-04 RX ADMIN — HYDROMORPHONE HYDROCHLORIDE 0.5 MG: 1 INJECTION, SOLUTION INTRAMUSCULAR; INTRAVENOUS; SUBCUTANEOUS at 19:48

## 2022-10-04 RX ADMIN — HYDROMORPHONE HYDROCHLORIDE 0.5 MG: 1 INJECTION, SOLUTION INTRAMUSCULAR; INTRAVENOUS; SUBCUTANEOUS at 21:43

## 2022-10-04 RX ADMIN — LIDOCAINE HYDROCHLORIDE 0.5 ML: 10 INJECTION, SOLUTION EPIDURAL; INFILTRATION; INTRACAUDAL; PERINEURAL at 08:20

## 2022-10-04 RX ADMIN — AMINOCAPROIC ACID 5 G: 250 INJECTION, SOLUTION INTRAVENOUS at 08:30

## 2022-10-04 RX ADMIN — AMIODARONE HYDROCHLORIDE 200 MG: 200 TABLET ORAL at 21:37

## 2022-10-04 RX ADMIN — ONDANSETRON HYDROCHLORIDE 4 MG: 2 INJECTION, SOLUTION INTRAMUSCULAR; INTRAVENOUS at 12:25

## 2022-10-04 RX ADMIN — NICARDIPINE HYDROCHLORIDE 2 MG/HR: 2.5 INJECTION, SOLUTION INTRAVENOUS at 11:16

## 2022-10-04 RX ADMIN — FENTANYL CITRATE 150 MCG: 0.05 INJECTION, SOLUTION INTRAMUSCULAR; INTRAVENOUS at 12:03

## 2022-10-04 RX ADMIN — FENTANYL CITRATE 750 MCG: 0.05 INJECTION, SOLUTION INTRAMUSCULAR; INTRAVENOUS at 08:01

## 2022-10-04 RX ADMIN — SODIUM CHLORIDE: 9 INJECTION, SOLUTION INTRAVENOUS at 08:22

## 2022-10-04 RX ADMIN — SODIUM CHLORIDE 20 ML/HR: 0.45 INJECTION, SOLUTION INTRAVENOUS at 20:00

## 2022-10-04 RX ADMIN — MUPIROCIN 1 APPLICATION: 20 OINTMENT TOPICAL at 21:36

## 2022-10-04 RX ADMIN — PHENYLEPHRINE HYDROCHLORIDE 25 MCG/MIN: 10 INJECTION INTRAVENOUS at 10:10

## 2022-10-04 RX ADMIN — SODIUM CHLORIDE 5 MG/HR: 0.9 INJECTION, SOLUTION INTRAVENOUS at 14:45

## 2022-10-04 RX ADMIN — ALBUMIN (HUMAN): 12.5 INJECTION, SOLUTION INTRAVENOUS at 12:29

## 2022-10-04 RX ADMIN — CEFAZOLIN 2000 MG: 1 INJECTION, POWDER, FOR SOLUTION INTRAMUSCULAR; INTRAVENOUS at 08:30

## 2022-10-04 RX ADMIN — SODIUM CHLORIDE 2 UNITS/HR: 9 INJECTION, SOLUTION INTRAVENOUS at 11:22

## 2022-10-04 RX ADMIN — CALCIUM CHLORIDE 0.25 G: 100 INJECTION INTRAVENOUS; INTRAVENTRICULAR at 12:43

## 2022-10-04 RX ADMIN — SODIUM CHLORIDE: 0.9 INJECTION, SOLUTION INTRAVENOUS at 07:53

## 2022-10-04 RX ADMIN — HEPARIN SODIUM 5000 UNITS: 1000 INJECTION INTRAVENOUS; SUBCUTANEOUS at 09:53

## 2022-10-04 RX ADMIN — HEPARIN SODIUM 27600 UNITS: 1000 INJECTION INTRAVENOUS; SUBCUTANEOUS at 10:15

## 2022-10-04 RX ADMIN — CALCIUM CHLORIDE 0.5 G: 100 INJECTION INTRAVENOUS; INTRAVENTRICULAR at 13:12

## 2022-10-04 RX ADMIN — AMINOCAPROIC ACID 1 G/HR: 250 INJECTION, SOLUTION INTRAVENOUS at 17:45

## 2022-10-04 RX ADMIN — ACETAMINOPHEN 975 MG: 325 TABLET, FILM COATED ORAL at 21:37

## 2022-10-04 RX ADMIN — FENTANYL CITRATE 250 MCG: 0.05 INJECTION, SOLUTION INTRAMUSCULAR; INTRAVENOUS at 10:28

## 2022-10-04 RX ADMIN — METOROPROLOL TARTRATE 1 MG: 5 INJECTION, SOLUTION INTRAVENOUS at 12:26

## 2022-10-04 RX ADMIN — PROPOFOL 50 MG: 10 INJECTION, EMULSION INTRAVENOUS at 08:01

## 2022-10-04 RX ADMIN — MUPIROCIN 1 APPLICATION: 20 OINTMENT TOPICAL at 06:38

## 2022-10-04 RX ADMIN — FENTANYL CITRATE 100 MCG: 0.05 INJECTION, SOLUTION INTRAMUSCULAR; INTRAVENOUS at 13:30

## 2022-10-04 RX ADMIN — PROPOFOL 100 MG: 10 INJECTION, EMULSION INTRAVENOUS at 10:17

## 2022-10-04 RX ADMIN — CHLORHEXIDINE GLUCONATE 15 ML: 1.2 SOLUTION ORAL at 06:39

## 2022-10-04 RX ADMIN — METOROPROLOL TARTRATE 2 MG: 5 INJECTION, SOLUTION INTRAVENOUS at 12:20

## 2022-10-04 RX ADMIN — METOROPROLOL TARTRATE 1 MG: 5 INJECTION, SOLUTION INTRAVENOUS at 12:56

## 2022-10-04 RX ADMIN — SODIUM CHLORIDE, SODIUM LACTATE, POTASSIUM CHLORIDE, AND CALCIUM CHLORIDE: .6; .31; .03; .02 INJECTION, SOLUTION INTRAVENOUS at 13:11

## 2022-10-04 RX ADMIN — MIDAZOLAM 2 MG: 1 INJECTION INTRAMUSCULAR; INTRAVENOUS at 07:56

## 2022-10-04 RX ADMIN — MAGNESIUM SULFATE HEPTAHYDRATE 2 G: 40 INJECTION, SOLUTION INTRAVENOUS at 14:03

## 2022-10-04 RX ADMIN — PHENYLEPHRINE HYDROCHLORIDE 40 MCG/MIN: 10 INJECTION INTRAVENOUS at 13:45

## 2022-10-04 RX ADMIN — HYDROMORPHONE HYDROCHLORIDE 0.5 MG: 1 INJECTION, SOLUTION INTRAMUSCULAR; INTRAVENOUS; SUBCUTANEOUS at 15:42

## 2022-10-04 RX ADMIN — GLYCOPYRROLATE 0.4 MCG: 0.2 INJECTION INTRAMUSCULAR; INTRAVENOUS at 13:12

## 2022-10-04 RX ADMIN — SODIUM CHLORIDE 3 UNITS/HR: 9 INJECTION, SOLUTION INTRAVENOUS at 14:00

## 2022-10-04 RX ADMIN — CALCIUM CHLORIDE 0.25 G: 100 INJECTION INTRAVENOUS; INTRAVENTRICULAR at 12:56

## 2022-10-04 RX ADMIN — VASOPRESSIN 2 UNITS: 20 INJECTION INTRAVENOUS at 12:33

## 2022-10-04 RX ADMIN — HEPARIN SODIUM 10000 UNITS: 1000 INJECTION INTRAVENOUS; SUBCUTANEOUS at 10:26

## 2022-10-04 RX ADMIN — METOROPROLOL TARTRATE 1 MG: 5 INJECTION, SOLUTION INTRAVENOUS at 12:38

## 2022-10-04 RX ADMIN — ALBUMIN (HUMAN): 12.5 INJECTION, SOLUTION INTRAVENOUS at 12:36

## 2022-10-04 NOTE — CONSULTS
25 June Needham Nelson 74 y o  male MRN: [id number]  Unit/Bed#: [facility] 415-01 Encounter: [id number]    Inpatient consult to Jyotsna Norton performed by: Cierra Rubin PA-C  Consult ordered by: Ange Beyer PA-C          Physician Requesting Consult: Oscar Vallejo MD    Reason for Consult / Principal Problem: Bicuspid aortic valve    HPI: Lizeth Gonzalez is a 74 y o  male who was evaluated as an outpatient for known severe AS  Pre-operative testing was performed and he was found to have single vessel CAD as well  It was recommended he undergo surgical replacement of his aortic valve and he presents today to the ICU following tissue AVR and CABG x 1 (LIMA to LAD)  He received 1 plts and 1 FFP with plan to transfuse 1 cryo given intra-op coagulopathy  He arrives on neosynephrine 40 mcg/min  CARDIOPULMONARY BYPASS TIME 106 minutes  CROSSCLAMP TIME 89 minutes  PMH: bicuspid aortic valve, HTN, HLD, pre-DM    History obtained from chart review due to patient being intubated and sedated  ---------------------------------------------------------------------------------------------------------------------------------------------------------------------  Impressions:  1  Severe AS s/p tissue AVR  2  Bicuspid AV  3  CAD s/p CABG x 1  4  HTN  5  HLD  6  Pre-diabetes    Plan:    Neuro: D/C continuous sedation  Tylenol ATC and PRN oxycodone/dilaudid for pain  Trend neuro exam   Delirium precautions  CV: MAP goal >65  SBP goal <130  CI>2 2  Post-op medications: Neosynephrine, 40 mcg/min  Wean emerald as able  Volume resuscitation as needed  Monitor rhythm on telemetry  Epicardial pacing wires in place  Intra-op YOBANY LVEF 60%  Lung: Check STAT post-op ABG and CXR  Wean vent with spontaneous breathing trial with goal to extubate today  GI: GI prophylaxis with PPI  Bowel regimen  Zofran PRN for nausea  FEN: NPO  Replenish K >4 0, mag >2 0 and calcium >7 0       : Check STAT post-op BMP  Aguiar in place  Monitor UOP with goal >0 5cc/kg/hour  Lasix versus volume resuscitate as needed depending on hemodynamics and volume status  ID: Prophylactic post-op abx  Maintain normothermia  Trend temps  Heme: Check STAT post-op H/H and platelets  Monitor incision site, invasive lines, and chest tube outputs for bleeding  Send coag panel if needed  Endo: Insulin gtt for blood sugar control  Results from last 6 Months   Lab Units 09/23/22  0851 04/12/22  0835   HEMOGLOBIN A1C % 5 7* 5 9*     Disposition: ICU Care   ---------------------------------------------------------------------------------------------------------------------------------------------------------------------  Historical Information   Past Medical History:   Diagnosis Date   • Aortic stenosis    • Ascending aortic aneurysm    • Hyperlipidemia    • Hypertension    • Nonrheumatic aortic valve insufficiency    • Osteoarthritis    • Prostatitis      Past Surgical History:   Procedure Laterality Date   • CARDIAC CATHETERIZATION N/A 9/30/2022    Procedure: Cardiac RHC/LHC; Surgeon: Sabine Vargas MD;  Location: [facility] C[facility]; Service: Cardiology   • COLONOSCOPY  08/09/2017   • EYE MUSCLE SURGERY      stigmatism correction age 13   • INGUINAL HERNIA REPAIR Left    • SKIN CANCER EXCISION     • TONSILECTOMY AND ADNOIDECTOMY       Social History   Social History     Substance and Sexual Activity   Alcohol Use Not Currently   • Alcohol/week: 28 0 standard drinks   • Types: 14 Glasses of wine, 14 Shots of liquor per week    Comment: 2 small glasses of wine & 2 shots whiskey daily     Social History     Substance and Sexual Activity   Drug Use Not Currently     Social History     Tobacco Use   Smoking Status Never Smoker   Smokeless Tobacco Never Used     History reviewed  No pertinent family history    I have reviewed this patient's family history and commented on sigificant items within the HPI    ROS: ROS unable to be obtained due to patient being intubated and sedated  Allergies: Allergies   Allergen Reactions   • Pollen Extract Nasal Congestion       Home Medications:   Prior to Admission medications    Medication Sig Start Date End Date Taking? Authorizing Provider   aspirin (ECOTRIN LOW STRENGTH) 81 mg EC tablet Take 81 mg by mouth daily   Yes Historical Provider, MD   mupirocin (BACTROBAN) 2 % ointment Apply 1 application topically 2 (two) times a day for 5 days Apply to each nostril twice daily for five days before your operation   9/9/22 9/14/22 Yes Alejandro January, ANT   Bioflavonoid Products (FRANNY C PO) Take by mouth    Historical Provider, MD   Cyanocobalamin (VITAMIN B 12 PO) Take by mouth    Historical Provider, MD   lisinopril (ZESTRIL) 20 mg tablet Take 1 tablet by mouth daily 8/13/22   Historical Provider, MD   Magnesium 100 MG CAPS Take by mouth    Historical Provider, MD   multivitamin (THERAGRAN) TABS Take 1 tablet by mouth daily    Historical Provider, MD [place] 450 MG CAPS Take 1 tablet by mouth daily    Historical Provider, MD     Inpatient Medications:  Scheduled Meds:  Current Facility-Administered Medications   Medication Dose Route Frequency Provider Last Rate   • acetaminophen  650 mg Rectal Q4H PRN Claudeen Southerly, PA-C     • acetaminophen  975 mg Oral Q8H Claudeen Southerly, PA-C     • amiodarone  200 mg Oral [facility] Claudeen Southerly, [place]     • aspirin  325 mg Oral Daily Claudeen Southerly, PA-C     • atorvastatin  80 mg Oral Daily With JAMES Petit     • bisacodyl  10 mg Rectal Daily PRN Claudeen Southerly, PA-C     • calcium chloride  1 g Intravenous Once Claudeen Southerly, PA-C     • cefazolin  2,000 mg Intravenous Q8H Claudeen Southerly, PA-C     • chlorhexidine  15 mL Swish & Spit BID Claudeen Southerly, PA-C     • fentanyl citrate (PF)  50 mcg Intravenous Once Claudeen Southerly, PA-C     • fentanyl citrate (PF)  50 mcg Intravenous Q1H PRN Claudeen Southerly, PA-C     • [START ON 10/5/2022] fondaparinux  2 5 mg Subcutaneous Daily Ange Beyer PA-C     • furosemide  40 mg Intravenous Q6H PRN Ange Beyer PA-C     • HYDROmorphone  0 5 mg Intravenous Q2H PRN Ange Beyer PA-C     • insulin regular (HumuLIN R,NovoLIN R) infusion  0 3-21 Units/hr Intravenous Titrated Ange Beyer PA-C     • lactated ringers  500 mL Intravenous Q30 Min PRN Ange Beyer PA-C     • lidocaine (cardiac)  100 mg Intravenous Q30 Min PRN Ange Beyer PA-C     • magnesium sulfate  2 g Intravenous Once Ange Beyer PA-C     • mupirocin  1 application Nasal [id number] [address] Ange Beyer PA-C     • niCARdipine  2 5-15 mg/hr Intravenous Titrated Ange Beyer PA-C     • ondansetron  4 mg Intravenous Q6H PRN Ange Beyer PA-C     • oxyCODONE  2 5 mg Oral Q4H PRN Ange Beyer PA-C     • oxyCODONE  5 mg Oral Q4H PRN Ange Beyer PA-C     • [START ON 10/5/2022] pantoprazole  40 mg Oral Daily Ange Beyer PA-C     • phenylephine   mcg/min Intravenous Titrated Ange Beyer PA-C     • polyethylene glycol  17 g Oral Daily Ange Beyer PA-C     • potassium chloride  20 mEq Intravenous Once PRN Ange Beyer PA-C     • potassium chloride  20 mEq Intravenous Q1H PRN Ange Beyer PA-C     • potassium chloride  20 mEq Intravenous Q30 Min PRN Ange Beyer PA-C     • sodium chloride  20 mL/hr Intravenous Continuous Ange Beyer PA-C       Continuous Infusions:insulin regular (HumuLIN R,NovoLIN R) infusion, 0 3-21 Units/hr  niCARdipine, 2 5-15 mg/hr  phenylephine,  mcg/min  sodium chloride, 20 mL/hr      PRN Meds:  acetaminophen, 650 mg, Q4H PRN  bisacodyl, 10 mg, Daily PRN  fentanyl citrate (PF), 50 mcg, Q1H PRN  furosemide, 40 mg, Q6H PRN  HYDROmorphone, 0 5 mg, Q2H PRN  lactated ringers, 500 mL, Q30 Min PRN  lidocaine (cardiac), 100 mg, Q30 Min PRN  ondansetron, 4 mg, Q6H PRN  oxyCODONE, 2 5 mg, Q4H PRN  oxyCODONE, 5 mg, Q4H PRN  potassium chloride, 20 mEq, Once PRN  potassium chloride, 20 mEq, Q1H PRN  potassium chloride, 20 mEq, Q30 Min PRN      ---------------------------------------------------------------------------------------------------------------------------------------------------------------------  Vitals:   Vitals:    10/04/22 0609 10/04/22 0617 10/04/22 1410 10/04/22 1420   BP: 160/87  (!) 88/55 96/56   Pulse: 95  71 72   Resp: 16  15 16   Temp: 97 9 °F (36 6 °C)  97 5 °F (36 4 °C) 97 5 °F (36 4 °C)   TempSrc: Temporal      SpO2: 99%   97%   Weight:  83 kg (183 lb)     Height:  5' 11" (1 803 m)       Arterial Line:  Arterial Line BP: 110/63  Arterial Line MAP (mmHg): 77 mmHg    Temperature: Temp (24hrs), Av 6 °F (36 4 °C), Min:97 5 °F (36 4 °C), Max:97 9 °F (36 6 °C)  Current: Temperature: 97 5 °F (36 4 °C)    Weights: IBW (Ideal Body Weight): 75 3 kg  Body mass index is 25 52 kg/m²  Hemodynamic Monitoring:  PAP: 26/10, CVP: 8, CO: 3 5, CI: 1 7, SVR: 1805    Ventilator Settings:  Respiratory  Report   Lab Data (Last 4 hours)    None         O2/Vent Data (Last 4 hours)      10/04 1354           Vent Mode AC/VC       Resp Rate (BPM) (BPM) 16       Vt (mL) (mL) 420       FIO2 (%) (%) 60       PEEP (cmH2O) (cmH2O) 6       MV 6 5                 Labs:   Results from last 7 days   Lab Units 10/04/22  1400 10/04/22  1357 10/04/22  1248 10/04/22  1246 10/04/22  1055 10/04/22  1051   HEMOGLOBIN g/dL  --  10 9*  --   --   --   --    I STAT HEMOGLOBIN g/dl 9 5*  --   --  9 2*   < >  --    HEMATOCRIT %  --  32 5*  --   --   --   --    HEMATOCRIT, ISTAT % 28*  --   --  27*   < >  --    PLATELETS Thousands/uL  --  126* 92*  --   --  113*    < > = values in this interval not displayed       Results from last 7 days   Lab Units 10/04/22  1400 10/04/22  1357 10/04/22  1246 10/04/22  1159 10/04/22  0836 22  0753   SODIUM mmol/L  --  139  --   --   --  137   POTASSIUM mmol/L  --  4 2  --   --   --  4 3   CHLORIDE mmol/L  --  112*  --   --   --  108   CO2 mmol/L  --  22  --   --   --  24   CO2, I-STAT mmol/L 25  --  24 30   < >  --    BUN mg/dL  --  12  --   --   --  14   CREATININE mg/dL  --  0 76  --   --   --  0 88   CALCIUM mg/dL  --  8 6  --   --   --  8 4   GLUCOSE, ISTAT mg/dl 146*  --  187* 200*   < >  --     < > = values in this interval not displayed  Post-op /40 4/164/23 9/-1/99%  Post-op CXR: Lines and tubes in appropriate position I have personally reviewed pertinent films in PACS  Post-op EKG: NSR  This was personally reviewed by myself  Physical Exam  Constitutional:       General: He is not in acute distress  Interventions: He is sedated, intubated and restrained  HENT:      Head: Atraumatic  Mouth/Throat:      Mouth: Mucous membranes are moist    Cardiovascular:      Rate and Rhythm: Normal rate and regular rhythm  Pulses:           Radial pulses are 2+ on the right side and 2+ on the left side  Dorsalis pedis pulses are 2+ on the right side and 2+ on the left side  Pulmonary:      Effort: He is intubated  Breath sounds: No wheezing, rhonchi or rales  Abdominal:      General: Bowel sounds are decreased  There is no distension  Palpations: Abdomen is soft  Musculoskeletal:      Cervical back: Neck supple  Right lower leg: No edema  Left lower leg: No edema  Skin:     General: Skin is warm and dry  Capillary Refill: Capillary refill takes 2 to 3 seconds  Invasive lines and devices:   Invasive Devices  Report    Central Venous Catheter Line  Duration           CVC Central Lines 10/04/22 Triple <1 day    Introducer 10/04/22 <1 day          Peripheral Intravenous Line  Duration           Peripheral IV 10/04/22 Right Hand <1 day          Arterial Line  Duration           Arterial Line 10/04/22 Left Radial <1 day          Line  Duration           Pacer Wires <1 day    Pacer Wires <1 day          Drain  Duration           Chest Tube 1 Left Pleural 32 Fr  <1 day    Chest Tube 2 Posterior;Mediastinal 32 Fr  <1 day    Chest Tube 3 Anterior;Mediastinal 32 Fr  <1 day Urethral Catheter Non-latex;Straight-tip; Temperature probe 16 Fr  <1 day          Airway  Duration           ETT  Hi-Lo; Cuffed;Oral 8 5 mm <1 day              ---------------------------------------------------------------------------------------------------------------------------------------------------------------------  Care Time Delivered:   No Critical Care time spent     SIGNATURE: Florentino Keenan PA-C  DATE: October 4, 2022  TIME: 2:25 PM

## 2022-10-04 NOTE — ANESTHESIA PROCEDURE NOTES
Arterial Line Insertion  Performed by: Tete Belle MD  Authorized by: Tete Belle MD   Consent: Verbal consent obtained  Written consent obtained  Risks and benefits: risks, benefits and alternatives were discussed  Consent given by: patient  Patient understanding: patient states understanding of the procedure being performed  Patient consent: the patient's understanding of the procedure matches consent given  Procedure consent: procedure consent matches procedure scheduled  Relevant documents: relevant documents present and verified  Required items: required blood products, implants, devices, and special equipment available  Patient identity confirmed: arm band and verbally with patient  Preparation: Patient was prepped and draped in the usual sterile fashion    Indications: multiple ABGs and hemodynamic monitoring  Orientation:  Left  Location: radial arterylidocaine (PF) (XYLOCAINE-MPF) 1 % - Infiltration   0 5 mL - 10/4/2022 8:20:00 AM  Procedure Details:  Needle gauge: 20  Seldinger technique: Seldinger technique used  Number of attempts: 1    Post-procedure:  Post-procedure: chlorhexidine patch applied, dressing applied and line sutured  Waveform: good waveform and waveform confirmed  Post-procedure CNS: normal and unchanged  Patient tolerance: patient tolerated the procedure well with no immediate complications  Comments: Pre-induction, 2 minutes

## 2022-10-04 NOTE — ANESTHESIA PROCEDURE NOTES
Procedure Performed: YOBANY Anesthesia  Start Time:  10/4/2022 8:20 AM        Preanesthesia Checklist    Patient identified, IV assessed, risks and benefits discussed, monitors and equipment assessed, procedure being performed at surgeon's request and anesthesia consent obtained  Procedure    Diagnostic Indications for YOBANY:  assessment of ascending aorta, assessment of surgical repair and hemodynamic monitoring  Type of YOBANY: complete YOBANY with interpretation  Images Saved: ultrasound permanent image saved  Physician Requesting Echo: Laura Solorio MD   Location performed: OR  Intubated  Bite block not placed  Heart visualized  Insertion of YOBANY Probe:  Atraumatic  Probe Type:  Epiaortic and multiplane  Modalities:  3D, color flow mapping, continuous wave Doppler and pulse wave Doppler  Echocardiographic and Doppler Measurements    PREPROCEDURE    LEFT VENTRICLE:  Systolic Function: normal  Ejection Fraction: 60%  RIGHT VENTRICLE:  Systolic Function: normal   Cavity size normal               AORTIC VALVE:  Leaflets: fusion oc LCC and RCC; severely calcified and bileaflet  Mean Gradient: 60 mmHg  Peak Gradient: 90 mmHg  Maximum velocity (cm/s): 4 7 m/sec  Regurgitation: moderate  Pressure Half-time: 474 ms  MITRAL VALVE:  Leaflets: calcified and normal  Leaflet Motions: chordal CHRISTIANO  Regurgitation: mild  TRICUSPID VALVE:  Leaflets: normal    Regurgitation: mild  ASCENDING AORTA:   Diameter (cm): 4 4-4 5 cm (YOBANY and epi-aortic) Dissection not present  AORTIC ARCH:    dissection not present  Grade 3: atheroma protruding < 0 5 cm into lumen  DESCENDING AORTA:    Dissection not present  Grade 3: atheroma protruding < 0 5 cm into lumen  RIGHT ATRIUM:   No spontaneous echo contrast     LEFT ATRIUM:   No spontaneous echo contrast     LEFT ATRIAL APPENDAGE:   No spontaneous echo contrast Emptying Velocity: 41 cm/sec      OTHER ATRIAL FINDINGS:  No JAY clot     ATRIAL SEPTUM:  Intra-atrial septal morphology: no PFO by CFD  EPIAORTIC:  Plaque Thickness: 0-5 mm  POSTPROCEDURE    LEFT VENTRICLE: Unchanged   RIGHT VENTRICLE:   Systolic Function: top normal          AORTIC VALVE:   Leaflets: bioprosthetic valve in aortic position, well-seated, does not rock, good leaflet excursion and closure, no PVL and bioprosthetic  Mean Gradient: not accurate, given presence iof CHRISTIANO - gradient across AV/LVOT 15 mmHg ("dagger shape, sugesting LVOTO)       MITRAL VALVE:    Regurgitation: mild-mod; significant chordal CHRISTIANO remains; gradient across LVOT/AV 15 mmHg  TRICUSPID VALVE: Unchanged   AORTA: Unchanged   Dissection: Dissection not present  REMOVAL:  Probe Removal: atraumatic

## 2022-10-04 NOTE — ANESTHESIA POSTPROCEDURE EVALUATION
Post-Op Assessment Note    CV Status:  Stable      Airway: intubated      Post Op Vitals Reviewed: Yes      Staff: Anesthesiologist   Comments: see intra-op quick note for details of ICU hand off        No complications documented      BP      Temp      Pulse     Resp      SpO2

## 2022-10-04 NOTE — OP NOTE
OPERATIVE REPORT  PATIENT NAME: Rene Meier    :  1950  MRN: [id number]  Pt Location: [facility] OR ROOM 16    SURGERY DATE: 10/4/2022    SURGEON: Rolanda Zuniga    ASSISTANT: Veronica Camejo PA-C    ADDITIONAL ASSISTANT: None    PREOPERATIVE DIAGNOSES: Symptomatic severe aortic stenosis, Aortic valve insufficiency and Bicuspid aortic valve,  Single vessel coronary artery disease  POSTOPERATIVE DIAGNOSES Symptomatic severe aortic stenosis, Aortic valve insufficiency and Bicuspid aortic valve, Single vessel coronary artery disease  NYHA Class: 3    CCS Class: 3    PROCEDURES 1  Aortic valve replacement with a 25 mm Mckenna Inspiris Resilia bioprosthetic valve  2  Coronary artery bypass grafting x 1 with LIMA to LAD  ANESTHESIA General endotracheal anesthesia with transesophageal echocardiogram guidance, DR Kirt Gomez     CARDIOPULMONARY BYPASS TIME 106 minutes  CROSSCLAMP TIME 89 minutes  PACKS/TUBES/DRAINS Chest tubes x3  MATERIALS Pacing wires: A x1, V x1  SPECIMENS Aortic valve  TRANSFUSION 1u Plts, 1u FFP, 1u cryoprecipitate  ESTIMATED BLOOD LOSS: 200 mL    OPERATIVE TECHNIQUE The patient was taken to the operating room and placed supine on the operating table  Following the satisfactory induction of general anesthesia and placement of monitoring lines, the patient was prepped and draped in the usual sterile fashion  A time-out procedure was performed  The patient underwent median sternotomy, LIMA harvest, systemic heparinization, and conduit preparation  The patient underwent pericardiotomy and epiaortic ultrasound was used to evaluate the ascending aorta, which was found to be free of significant atheromatous disease  The ascending aorta was measured with YOBANY, epiaortic ultrasound, and directly with a graft sizer  The maximal aorta of the ascending aorta was 4 3 cm, therefore aortic replacement was not warranted   The patient underwent aortic and right atrial cannulation and an antegrade was placed  The patient was initiated on bypass and an LV vent was placed through the right superior pulmonary vein  The ascending aorta was crossclamped  Antegrade [place] cardioplegia was delivered with an acceptable arrest        The heart was positioned for grafting  The LIMA was anastomosed to the LAD in end-to-side fashion using running 7-0 Prolene suture  The quality of the graft was excellent  The heart was returned to its anatomic position  Aortotomy was performed  Additional handheld cardioplegia with an excellent arrest and the heart remained quiet throughout the remainder of the procedure  The aortic valve was exposed  The valve was bicuspid with fusion of the left and right leaflets  There was severe stenosis and extremely calcified leaflets and annulus  The leaflets were excised and the annulus was debrided of calcium  This was a painstaking and lengthy process due to the extreme degree of calcification present in the valve and annulus  Ultimately, the annulus was sized to a 25 mm Mckenna Spill Inciris bioprosthetic valve  Valve sutures were placed with pledgets on the ventricular side  The valve was brought to the field  The sutures were passed through the sewing ring of the valve  The valve was seated and the sutures were tied down, and clearance of the coronary ostia was confirmed  While de-airing the heart, the aortotomy was closed with 2 layers of running 4-0 Prolene suture  The heart was extensively de-aired and the crossclamp was removed  Atrial and ventricular pacing wires were placed  Following a period of reperfusion, the patient was weaned from cardiopulmonary bypass and decannulated  Protamine was administered with normalization of the ACT  There was significant coagulopathy and multiple blood products were therefore transfused  Hemostasis was confirmed in all fields  Thoracostomy tubes were placed  The sternum was closed with stainless steel wires   The fascia, subdermis and skin were closed as multiple layers of running absorbable suture  As the attending surgeon, I was present and scrubbed for all critical portions of this procedure  Sponge, needle and instrument counts were reported as correct by the nursing staff  There is no cardiac residency program at this facility and for complex procedures such as this, it is vital to have a physician assistant experienced in cardiac surgery  The assistance of Jhoan So was necessary for retraction of the heart and bypass conduit with proper tissue handling techniques, and following of the suture with correct tension during construction of the coronary anastomosis  The assistance of Jhoan So was necessary for experienced gentle retraction of the aortic root thereby providing exposure of the aortic valve during valve excision, annular debridement, placement of annular sutures, seating of the valve, and tying of the valve sutures  Clyde Coleman PA-C was also necessary for following the suture with correct tension during closure of the aortotomy  Final transesophageal echocardiogram demonstrated normal biventricular function and normal bioAVR function        Loreto Bautista  DATE: October 4, 2022  TIME: 1:39 PM

## 2022-10-04 NOTE — ANESTHESIA PROCEDURE NOTES
Introducer/[place]  Performed by: Joseluis Castillo MD  Authorized by: Joseluis Castillo MD     Date/Time: 10/4/2022 8:15 AM  Consent: Verbal consent obtained  Written consent obtained  Risks and benefits: risks, benefits and alternatives were discussed  Consent given by: patient  Patient understanding: patient states understanding of the procedure being performed  Patient consent: the patient's understanding of the procedure matches consent given  Procedure consent: procedure consent matches procedure scheduled  Relevant documents: relevant documents present and verified  Required items: required blood products, implants, devices, and special equipment available  Patient identity confirmed: arm band  Indications: vascular access and central pressure monitoring  Location details: right internal jugular  Catheter size: 9 Fr  Patient position: Trendelenburg  Anesthesia method: ga    Assessment: blood return through all ports and free fluid flow  Preparation: skin prepped with ChloraPrep  Skin prep agent dried: skin prep agent completely dried prior to procedure  Sterile barriers: all five maximum sterile barriers used - cap, mask, sterile gown, sterile gloves, and large sterile sheet  Hand hygiene: hand hygiene performed prior to central venous catheter insertion  Ultrasound guidance: yes  ultrasound permanent image saved  Site selection rationale: avr/cabg  Number of attempts: 1  Successful placement: yes  Post-procedure: line sutured and dressing applied  Patient tolerance: patient tolerated the procedure well with no immediate complications

## 2022-10-04 NOTE — INTERVAL H&P NOTE
Vitals:    10/04/22 0609   BP: 160/87   Pulse: 95   Resp: 16   Temp: 97 9 °F (36 6 °C)   SpO2: 99%         H&P reviewed  After examining the patient I find no changes in the patients condition since the H&P was completed  Cardiac cath showed single vessel LAD disease  CTA showed maximal aortic diameter 4 5 cm  Plan for Tissue AVR, CABG x1  Aorta will be assessed intraoperatively  Preoperative Beta Blocker: indicated  Anticipated Length of Stay: Patient will be admitted on an inpatient basis with an anticipated length of stay of grater than 2 midnights  Justification for Hospital StaY: Post surgical recovery following open heart surgery        [place]  10/04/22  7:30 AM

## 2022-10-04 NOTE — ANESTHESIA PROCEDURE NOTES
Central Line Insertion  Performed by: Kasey Smith MD  Authorized by: Kasey Smith MD     Date/Time: 10/4/2022 8:16 AM  Catheter Type:  triple lumen  Consent: Verbal consent obtained  Written consent obtained  Risks and benefits: risks, benefits and alternatives were discussed  Consent given by: patient  Patient understanding: patient states understanding of the procedure being performed  Patient consent: the patient's understanding of the procedure matches consent given  Procedure consent: procedure consent matches procedure scheduled  Relevant documents: relevant documents present and verified  Required items: required blood products, implants, devices, and special equipment available  Patient identity confirmed: arm band  Indications: vascular access and central pressure monitoring  Patient position: Trendelenburg  Anesthesia method: ga    Assessment: blood return through all ports and free fluid flow  Preparation: skin prepped with ChloraPrep  Skin prep agent dried: skin prep agent completely dried prior to procedure  Sterile barriers: all five maximum sterile barriers used - cap, mask, sterile gown, sterile gloves, and large sterile sheet  Hand hygiene: hand hygiene performed prior to central venous catheter insertion  sterile gel and probe cover used in ultrasound-guided central venous catheter insertionultrasound permanent image saved  Vessel of Catheter Tip End: central venous  Number of attempts: 1  Successful placement: yes  Post-procedure: dressing applied, chlorhexidine patch applied and line sutured  Patient tolerance: patient tolerated the procedure well with no immediate complications

## 2022-10-05 ENCOUNTER — APPOINTMENT (OUTPATIENT)
Dept: RADIOLOGY | Facility: HOSPITAL | Age: 72
DRG: 219 | End: 2022-10-05
Payer: COMMERCIAL

## 2022-10-05 LAB
ABO GROUP BLD BPU: NORMAL
ANION GAP SERPL CALCULATED.3IONS-SCNC: 5 MMOL/L (ref 4–13)
ANION GAP SERPL CALCULATED.3IONS-SCNC: 7 MMOL/L (ref 4–13)
BASE EXCESS BLDA CALC-SCNC: 0.3 MMOL/L
BPU ID: NORMAL
BUN SERPL-MCNC: 12 MG/DL (ref 5–25)
BUN SERPL-MCNC: 18 MG/DL (ref 5–25)
CALCIUM SERPL-MCNC: 7.5 MG/DL (ref 8.3–10.1)
CALCIUM SERPL-MCNC: 8.1 MG/DL (ref 8.3–10.1)
CHLORIDE SERPL-SCNC: 102 MMOL/L (ref 96–108)
CHLORIDE SERPL-SCNC: 109 MMOL/L (ref 96–108)
CO2 SERPL-SCNC: 23 MMOL/L (ref 21–32)
CO2 SERPL-SCNC: 24 MMOL/L (ref 21–32)
CREAT SERPL-MCNC: 0.64 MG/DL (ref 0.6–1.3)
CREAT SERPL-MCNC: 0.96 MG/DL (ref 0.6–1.3)
CROSSMATCH: NORMAL
ERYTHROCYTE [DISTWIDTH] IN BLOOD BY AUTOMATED COUNT: 12.9 % (ref 11.6–15.1)
GFR SERPL CREATININE-BSD FRML MDRD: 77 ML/MIN/1.73SQ M
GFR SERPL CREATININE-BSD FRML MDRD: 96 ML/MIN/1.73SQ M
GLUCOSE SERPL-MCNC: 116 MG/DL (ref 65–140)
GLUCOSE SERPL-MCNC: 117 MG/DL (ref 65–140)
GLUCOSE SERPL-MCNC: 129 MG/DL (ref 65–140)
GLUCOSE SERPL-MCNC: 131 MG/DL (ref 65–140)
GLUCOSE SERPL-MCNC: 141 MG/DL (ref 65–140)
GLUCOSE SERPL-MCNC: 143 MG/DL (ref 65–140)
GLUCOSE SERPL-MCNC: 144 MG/DL (ref 65–140)
GLUCOSE SERPL-MCNC: 180 MG/DL (ref 65–140)
GLUCOSE SERPL-MCNC: 200 MG/DL (ref 65–140)
HCO3 BLDA-SCNC: 25.1 MMOL/L (ref 22–28)
HCT VFR BLD AUTO: 25.9 % (ref 36.5–49.3)
HGB BLD-MCNC: 8.5 G/DL (ref 12–17)
MAGNESIUM SERPL-MCNC: 2.3 MG/DL (ref 1.6–2.6)
MAGNESIUM SERPL-MCNC: 2.3 MG/DL (ref 1.6–2.6)
MCH RBC QN AUTO: 32.3 PG (ref 26.8–34.3)
MCHC RBC AUTO-ENTMCNC: 32.8 G/DL (ref 31.4–37.4)
MCV RBC AUTO: 99 FL (ref 82–98)
NASAL CANNULA: 2
O2 CT BLDA-SCNC: 12.8 ML/DL (ref 16–23)
OXYHGB MFR BLDA: 96.6 % (ref 94–97)
PCO2 BLDA: 40.9 MM HG (ref 36–44)
PH BLDA: 7.41 [PH] (ref 7.35–7.45)
PLATELET # BLD AUTO: 102 THOUSANDS/UL (ref 149–390)
PMV BLD AUTO: 10.9 FL (ref 8.9–12.7)
PO2 BLDA: 108.4 MM HG (ref 75–129)
POTASSIUM SERPL-SCNC: 4 MMOL/L (ref 3.5–5.3)
POTASSIUM SERPL-SCNC: 4 MMOL/L (ref 3.5–5.3)
RBC # BLD AUTO: 2.63 MILLION/UL (ref 3.88–5.62)
SODIUM SERPL-SCNC: 132 MMOL/L (ref 135–147)
SODIUM SERPL-SCNC: 138 MMOL/L (ref 135–147)
SPECIMEN SOURCE: ABNORMAL
UNIT DISPENSE STATUS: NORMAL
UNIT PRODUCT CODE: NORMAL
UNIT PRODUCT VOLUME: 125 ML
UNIT PRODUCT VOLUME: 250 ML
UNIT PRODUCT VOLUME: 250 ML
UNIT PRODUCT VOLUME: 280 ML
UNIT PRODUCT VOLUME: 300 ML
UNIT PRODUCT VOLUME: 350 ML
UNIT RH: NORMAL
WBC # BLD AUTO: 8.86 THOUSAND/UL (ref 4.31–10.16)

## 2022-10-05 PROCEDURE — 93005 ELECTROCARDIOGRAM TRACING: CPT

## 2022-10-05 PROCEDURE — 71045 X-RAY EXAM CHEST 1 VIEW: CPT

## 2022-10-05 PROCEDURE — 85027 COMPLETE CBC AUTOMATED: CPT | Performed by: PHYSICIAN ASSISTANT

## 2022-10-05 PROCEDURE — 97163 PT EVAL HIGH COMPLEX 45 MIN: CPT

## 2022-10-05 PROCEDURE — 83735 ASSAY OF MAGNESIUM: CPT | Performed by: PHYSICIAN ASSISTANT

## 2022-10-05 PROCEDURE — 80048 BASIC METABOLIC PNL TOTAL CA: CPT | Performed by: PHYSICIAN ASSISTANT

## 2022-10-05 PROCEDURE — 82805 BLOOD GASES W/O2 SATURATION: CPT | Performed by: PHYSICIAN ASSISTANT

## 2022-10-05 PROCEDURE — 97530 THERAPEUTIC ACTIVITIES: CPT

## 2022-10-05 PROCEDURE — 97167 OT EVAL HIGH COMPLEX 60 MIN: CPT

## 2022-10-05 PROCEDURE — 82948 REAGENT STRIP/BLOOD GLUCOSE: CPT

## 2022-10-05 PROCEDURE — 99233 SBSQ HOSP IP/OBS HIGH 50: CPT | Performed by: STUDENT IN AN ORGANIZED HEALTH CARE EDUCATION/TRAINING PROGRAM

## 2022-10-05 RX ORDER — AMIODARONE HYDROCHLORIDE 50 MG/ML
INJECTION, SOLUTION INTRAVENOUS
Status: DISPENSED
Start: 2022-10-05 | End: 2022-10-06

## 2022-10-05 RX ORDER — FUROSEMIDE 10 MG/ML
40 INJECTION INTRAMUSCULAR; INTRAVENOUS ONCE
Status: COMPLETED | OUTPATIENT
Start: 2022-10-05 | End: 2022-10-05

## 2022-10-05 RX ORDER — DOCUSATE SODIUM 100 MG/1
100 CAPSULE, LIQUID FILLED ORAL 2 TIMES DAILY
Status: DISCONTINUED | OUTPATIENT
Start: 2022-10-05 | End: 2022-10-07

## 2022-10-05 RX ORDER — ALBUMIN, HUMAN INJ 5% 5 %
25 SOLUTION INTRAVENOUS ONCE
Status: COMPLETED | OUTPATIENT
Start: 2022-10-05 | End: 2022-10-05

## 2022-10-05 RX ORDER — FUROSEMIDE 10 MG/ML
40 INJECTION INTRAMUSCULAR; INTRAVENOUS ONCE
Status: DISCONTINUED | OUTPATIENT
Start: 2022-10-05 | End: 2022-10-05

## 2022-10-05 RX ORDER — ALBUMIN, HUMAN INJ 5% 5 %
12.5 SOLUTION INTRAVENOUS ONCE
Status: COMPLETED | OUTPATIENT
Start: 2022-10-05 | End: 2022-10-05

## 2022-10-05 RX ORDER — HYDROMORPHONE HCL/PF 1 MG/ML
0.5 SYRINGE (ML) INJECTION EVERY 2 HOUR PRN
Status: DISCONTINUED | OUTPATIENT
Start: 2022-10-05 | End: 2022-10-07

## 2022-10-05 RX ORDER — CALCIUM GLUCONATE 20 MG/ML
1 INJECTION, SOLUTION INTRAVENOUS ONCE
Status: COMPLETED | OUTPATIENT
Start: 2022-10-05 | End: 2022-10-05

## 2022-10-05 RX ORDER — INSULIN LISPRO 100 [IU]/ML
1-5 INJECTION, SOLUTION INTRAVENOUS; SUBCUTANEOUS
Status: DISCONTINUED | OUTPATIENT
Start: 2022-10-05 | End: 2022-10-10 | Stop reason: HOSPADM

## 2022-10-05 RX ORDER — ALBUMIN, HUMAN INJ 5% 5 %
SOLUTION INTRAVENOUS
Status: COMPLETED
Start: 2022-10-05 | End: 2022-10-05

## 2022-10-05 RX ORDER — FUROSEMIDE 10 MG/ML
40 INJECTION INTRAMUSCULAR; INTRAVENOUS DAILY
Status: DISCONTINUED | OUTPATIENT
Start: 2022-10-06 | End: 2022-10-06

## 2022-10-05 RX ADMIN — INSULIN LISPRO 1 UNITS: 100 INJECTION, SOLUTION INTRAVENOUS; SUBCUTANEOUS at 21:16

## 2022-10-05 RX ADMIN — HYDROMORPHONE HYDROCHLORIDE 0.5 MG: 1 INJECTION, SOLUTION INTRAMUSCULAR; INTRAVENOUS; SUBCUTANEOUS at 00:57

## 2022-10-05 RX ADMIN — MUPIROCIN 1 APPLICATION: 20 OINTMENT TOPICAL at 21:09

## 2022-10-05 RX ADMIN — POLYETHYLENE GLYCOL 3350 17 G: 17 POWDER, FOR SOLUTION ORAL at 09:05

## 2022-10-05 RX ADMIN — FUROSEMIDE 40 MG: 10 INJECTION, SOLUTION INTRAMUSCULAR; INTRAVENOUS at 12:14

## 2022-10-05 RX ADMIN — ALBUMIN (HUMAN) 12.5 G: 12.5 INJECTION, SOLUTION INTRAVENOUS at 16:36

## 2022-10-05 RX ADMIN — ALBUMIN, HUMAN INJ 5% 12.5 G: 5 SOLUTION at 16:36

## 2022-10-05 RX ADMIN — PANTOPRAZOLE SODIUM 40 MG: 40 TABLET, DELAYED RELEASE ORAL at 05:12

## 2022-10-05 RX ADMIN — ACETAMINOPHEN 975 MG: 325 TABLET, FILM COATED ORAL at 21:08

## 2022-10-05 RX ADMIN — CALCIUM GLUCONATE 1 G: 20 INJECTION, SOLUTION INTRAVENOUS at 07:07

## 2022-10-05 RX ADMIN — DOCUSATE SODIUM 100 MG: 100 CAPSULE, LIQUID FILLED ORAL at 09:05

## 2022-10-05 RX ADMIN — AMIODARONE HYDROCHLORIDE 1 MG/MIN: 50 INJECTION, SOLUTION INTRAVENOUS at 17:43

## 2022-10-05 RX ADMIN — OXYCODONE HYDROCHLORIDE 5 MG: 5 TABLET ORAL at 09:05

## 2022-10-05 RX ADMIN — MUPIROCIN 1 APPLICATION: 20 OINTMENT TOPICAL at 09:05

## 2022-10-05 RX ADMIN — DEXTROSE MONOHYDRATE 150 MG: 5 INJECTION INTRAVENOUS at 17:16

## 2022-10-05 RX ADMIN — AMIODARONE HYDROCHLORIDE 200 MG: 200 TABLET ORAL at 13:09

## 2022-10-05 RX ADMIN — DOCUSATE SODIUM 100 MG: 100 CAPSULE, LIQUID FILLED ORAL at 17:19

## 2022-10-05 RX ADMIN — OXYCODONE HYDROCHLORIDE 5 MG: 5 TABLET ORAL at 21:09

## 2022-10-05 RX ADMIN — AMIODARONE HYDROCHLORIDE 200 MG: 200 TABLET ORAL at 05:12

## 2022-10-05 RX ADMIN — AMIODARONE HYDROCHLORIDE 200 MG: 200 TABLET ORAL at 21:08

## 2022-10-05 RX ADMIN — DEXTROSE MONOHYDRATE 150 MG: 5 INJECTION INTRAVENOUS at 16:48

## 2022-10-05 RX ADMIN — ACETAMINOPHEN 975 MG: 325 TABLET, FILM COATED ORAL at 13:09

## 2022-10-05 RX ADMIN — ALBUMIN (HUMAN) 25 G: 12.5 INJECTION, SOLUTION INTRAVENOUS at 00:42

## 2022-10-05 RX ADMIN — CEFAZOLIN SODIUM 2000 MG: 2 SOLUTION INTRAVENOUS at 12:13

## 2022-10-05 RX ADMIN — FONDAPARINUX SODIUM 2.5 MG: 2.5 INJECTION, SOLUTION SUBCUTANEOUS at 09:05

## 2022-10-05 RX ADMIN — HYDROMORPHONE HYDROCHLORIDE 0.5 MG: 1 INJECTION, SOLUTION INTRAMUSCULAR; INTRAVENOUS; SUBCUTANEOUS at 05:12

## 2022-10-05 RX ADMIN — OXYCODONE HYDROCHLORIDE 5 MG: 5 TABLET ORAL at 13:09

## 2022-10-05 RX ADMIN — ALBUMIN (HUMAN) 12.5 G: 12.5 INJECTION, SOLUTION INTRAVENOUS at 02:21

## 2022-10-05 RX ADMIN — ACETAMINOPHEN 975 MG: 325 TABLET, FILM COATED ORAL at 05:12

## 2022-10-05 RX ADMIN — CHLORHEXIDINE GLUCONATE 15 ML: 1.2 SOLUTION ORAL at 17:19

## 2022-10-05 RX ADMIN — ASPIRIN 325 MG ORAL TABLET 325 MG: 325 PILL ORAL at 09:05

## 2022-10-05 RX ADMIN — CHLORHEXIDINE GLUCONATE 15 ML: 1.2 SOLUTION ORAL at 09:05

## 2022-10-05 RX ADMIN — CEFAZOLIN SODIUM 2000 MG: 2 SOLUTION INTRAVENOUS at 04:15

## 2022-10-05 NOTE — OCCUPATIONAL THERAPY NOTE
Occupational Therapy Evaluation     Patient Name: Reyna Boas VDKAM'M Date: 10/5/2022  Problem List  Principal Problem:    Bicuspid aortic valve  Active Problems:    Nonrheumatic aortic valve stenosis    Ascending aortic aneurysm    Nonrheumatic aortic valve insufficiency    Pre-diabetes    Hyperlipidemia    S/P CABG x 1    Coronary artery disease    S/P AVR (aortic valve replacement)    Past Medical History  Past Medical History:   Diagnosis Date    Aortic stenosis     Ascending aortic aneurysm     Hyperlipidemia     Hypertension     Nonrheumatic aortic valve insufficiency     Osteoarthritis     Prostatitis      Past Surgical History  Past Surgical History:   Procedure Laterality Date    CARDIAC CATHETERIZATION N/A 9/30/2022    Procedure: Cardiac RHC/LHC; Surgeon: Che Grove MD;  [facility]; Service: Cardiology    COLONOSCOPY  08/09/2017    CORONARY ARTERY BYPASS GRAFT N/A 10/4/2022    Procedure: CABG X 1, LIMA TO LAD;  Surgeon: Dez Cornell MD;  Location: [facility];  Service: Cardiac Surgery    EYE MUSCLE SURGERY      stigmatism correction age 13    INGUINAL HERNIA REPAIR Left     AZ RPLCMT AORTIC VALVE OPN W/STENTLESS TISSUE VALVE N/A 10/4/2022    Procedure: REPLACEMENT VALVE AORTIC (AVR) INSPIRIS RESILIA 25MM; Surgeon: Dez Cornell MD;  [facility];  Service: Cardiac Surgery    SKIN CANCER EXCISION      TONSILECTOMY AND ADNOIDECTOMY             10/05/22 1018   OT Last Visit   OT Visit Date 10/05/22   Note Type   Note type Evaluation   Restrictions/Precautions   Other Precautions Cardiac/sternal;Multiple lines;Telemetry  (a-line; CT)   Pain Assessment   Pain Assessment Tool [address] [place] [address] Pain Intervention(s) Repositioned; Ambulation/increased activity; Emotional support   Pain Rating: FLACC (Rest) - Face 0   Pain Rating: FLACC (Rest) - Legs 0   Pain Rating: FLACC (Rest) - Activity 0   Pain Rating: FLACC (Rest) - Cry 0   Pain Rating: FLACC (Rest) - Consolability 0   Score: FLACC (Rest) 0   Pain Rating: FLACC (Activity) - Face 1   Pain Rating: FLACC (Activity) - Legs 0   Pain Rating: FLACC (Activity) - Activity 0   Pain Rating: FLACC (Activity) - Cry 1   Pain Rating: FLACC (Activity) - Consolability 1   Score: FLACC (Activity) 3   Home Living   Type of Home House   Home Layout Two level;Stairs to enter with rails   Bathroom Shower/Tub Walk-in shower   Bathroom Toilet Standard   Additional Comments Pt reports living in a 2 story home with 1 JADEN  Prior Function   Level of [place] Independent with ADLs and functional mobility   Lives With Spouse   Receives Help From Family   ADL Assistance Independent   IADLs Independent   Falls in the last 6 months 0   Vocational Full time employment   Lifestyle   Autonomy Pt reports being I with ADLS, IADLS and moblity without device  (+)    Reciprocal Relationships Pt lives with his wife who is able to assist as needed upon d/c   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[facility] Enjoys reading and cooking   ADL   Where Assessed Chair   Eating Assistance 7  Independent   Grooming Assistance 5  Supervision/Setup   UB Bathing Assistance 3  Moderate Assistance   LB Bathing Assistance 3  Moderate Assistance   UB Dressing Assistance 3  Moderate Assistance   LB Dressing Assistance 3  Moderate [address]  3  Moderate Assistance   Transfers   Sit to Stand 4  Minimal assistance   Additional items Assist x 1; Increased time required;Verbal cues   Stand to Sit 4  Minimal assistance   Additional items Assist x 1; Increased time required;Verbal cues   Functional Mobility   Functional Mobility 4  Minimal assistance   Additional Comments Pt demonstrated short household mobility with RW  Pt is limited by dizziness at this time     Additional items Rolling walker   Balance   Static Sitting Fair   Dynamic Sitting Fair -   Static Standing Poor +   Dynamic Standing Poor +   Ambulatory Poor +   Activity Tolerance   Activity Tolerance Treatment limited secondary to medical complications (Comment)  (dizziness, decreased BP)   Medical Staff Made Aware Seen with PT 2* medical complexity/ instability   Nurse Made Aware RN confirmed okay to see pt and was present throughout   RUE Assessment   RUE Assessment WFL   LUE Assessment   LUE Assessment WFL   Cognition   Overall Cognitive Status WFL   Arousal/Participation Alert; Cooperative   Attention Within functional limits   Orientation Level Oriented X4   Memory Decreased recall of precautions   Following Commands Follows all commands and directions without difficulty   Comments Pt is somewhat anxious with mobility, but is very pleasant and cooperative  Assessment   Limitation Decreased ADL status; Decreased endurance;Decreased self-care trans;Decreased high-level ADLs   Prognosis Good   Assessment Pt is a 74 y o  male admitted to [facility] on 10/4/2022 w/ bicuspid aortic valve s/p AVR and CABG x1 on 10/4/2022  Pt  has a past medical history of Aortic stenosis, Ascending aortic aneurysm, Hyperlipidemia, Hypertension, Nonrheumatic aortic valve insufficiency, Osteoarthritis, and Prostatitis  Pt with active OT orders and ambulate  orders  Pt resides in a 2 story home with 1 JADEN  Pt lives with his wife who is able to assist as needed upon d/c  Pt was I w/  ADLS and IADLS, (+) drove, & required no use of DME PTA  Currently pt is min A for functional transfers and functional mobility, and mod A for ADLS overall  Pt is limited at this time 2*: pain, endurance, activity tolerance, functional mobility, forward functional reach, functional standing tolerance and decreased I w/ ADLS/IADLS  The following Occupational Performance Areas to address include: grooming, bathing/shower, toilet hygiene, dressing, functional mobility and clothing management   Based on the aforementioned OT evaluation, functional performance deficits, and assessments, pt has been identified as a high complexity evaluation  From OT standpoint, anticipate d/c home with family support  Pt to continue to benefit from acute immediate OT services to address the following goals 3-5x/week to  w/in 10-14 days: Noe Parekh Goals   Patient Goals To have less pain and return home   LTG Time Frame 10-14   Long Term Goal #1 See goals below   Plan   Treatment Interventions ADL retraining;Functional transfer training; Endurance training;Patient/family training;Equipment evaluation/education; Compensatory technique education;Continued evaluation;Cardiac education; Energy conservation; Activityengagement   Goal Expiration Date 10/19/22   OT Frequency 3-5x/wk   Recommendation   OT Discharge Recommendation No rehabilitation needs  (Home with increased social support)   AM-PAC Daily Activity Inpatient   Lower Body Dressing 2   Bathing 2   Toileting 2   Upper Body Dressing 3   Grooming 4   Eating 4   Daily Activity Raw Score 17   Daily Activity Standardized Score (Calc for Raw Score >=11) 37 26   AM-PAC Applied Cognition Inpatient   Following a Speech/Presentation 4   Understanding Ordinary Conversation 4   Taking Medications 4   Remembering Where Things Are Placed or Put Away 4   Remembering List of 4-5 Errands 4   Taking Care of Complicated Tasks 4   Applied Cognition Raw Score 24   Applied Cognition Standardized Score 62 21   Modified Illo Scale   Modified Lilo Scale 4       GOALS    1) Pt will increase activity tolerance to G for 30 min txment sessions    2) Pt will complete UB/LB dressing/self care w/ mod I using adaptive device and DME as needed    3) Pt will complete bathing w/ Mod I w/ use of AE and DME as needed    4) Pt will complete toileting w/ mod I w/ G hygiene/thoroughness using DME as needed    5) Pt will improve functional transfers to Mod I on/off all surfaces using DME as needed w/ G balance/safety     6) Pt will improve functional mobility during ADL/IADL/leisure tasks to Mod I using DME as needed w/ G balance/safety     7) Pt will demonstrate G carryover of pt/caregiver education and training as appropriate w/ mod I     8) Pt will engage in cardiac education using the Recovering After Cardiac Surgery packet w/ G participation and G carryover  9) Pt will demonstrate 100% carryover of precautions s/p review w/ mod I w/ G tolerance/participation t/o functional ADL/IADL/leisure tasks      10) Pt will independently identify and utilize 2-3 coping strategies to increase positive affect and promote overall well-being      11) Pt will engage in ongoing cognitive assessment w/ G participation to assist w/ safe d/c planning/recommendations (as needed)    AILYN Alvarez, OTR/L

## 2022-10-05 NOTE — UTILIZATION REVIEW
Initial Clinical Review    Elective IP surgical procedure  Age/Sex: 74 y o  male  Surgery Date: 10/4/2022  Procedure: 1  Aortic valve replacement with a 25 mm Mckenna Inspiris Resilia bioprosthetic valve  2  Coronary artery bypass grafting x 1 with LIMA to LAD  Anesthesia: General endotracheal anesthesia with transesophageal echocardiogram guidance  Operative Findings:   CARDIOPULMONARY BYPASS TIME 106 minutes     CROSSCLAMP TIME 89 minutes     PACKS/TUBES/DRAINS Chest tubes x3     MATERIALS Pacing wires: A x1, V x1     SPECIMENS Aortic valve  POD#1 Progress Note: s/p bioprosthetic AVR and CABG x 1  Initially high CT output, received 2 FFP, 1 cryo and amicar post-op with improvement  On/off low dose Neosynephrine throughout the night  Delined this AM with last CI 2 3  Wean off David as dinora  MAP goal >65, Cl >2 2  d/c PA cath  Keep a-line  NSR on telemetry  Hold lopressor with borderline hypotension  Maintain epicardial pacing wires for POD 1 prn pacing requirements  Po meds  Requiring 2L NC 2/2 poor inspiratory effort 2/2 pain  Continue incentive spirometry/coughing/deep breathing exercises  Wean supplemental O2 as dinora for sat >90%  Wean O2 as tolerated  Chest tube output remains persistently high; Continue chest tubes to suction today  ATC tylenol, prn oxy and dilaudid  Asa, lipitor and amiodarone, lasix and potassium  D/c fraser cath, strict I/Os  Trend UOP and BUN/Cr  SCDs  Glucose well-controlled  Discontinue continuous insulin infusion and add Insulin sliding scale coverage  PT/OT evals   Tx to Lv1 step down unit         Admission Orders: Date/Time/Statement:   Admission Orders (From admission, onward)     Ordered        10/04/22 0712  Inpatient Admission  Once                      Orders Placed This Encounter   Procedures   • Inpatient Admission     Standing Status:   Standing     Number of Occurrences:   1     Order Specific Question:   Level of Care     Answer:   Critical Care [15]     Order Specific Question:   Estimated length of stay     Answer:   More than 2 Midnights     Order Specific Question:   Certification     Answer:   I certify that inpatient services are medically necessary for this patient for a duration of greater than two midnights  See H&P and MD Progress Notes for additional information about the patient's course of treatment       Vital Signs:  Date/Time Temp Pulse Resp BP Arterial Line BP MAP SpO2 Calculated FIO2 (%) - Nasal Cannula Nasal Cannula O2 Flow Rate (L/min) O2 Device CO CI CVP (mean)   10/05/22 0600 -- 100 22 -- 123/64 84 mmHg 95 % 28 2 L/min Nasal cannula -- -- --   10/05/22 0400 98 8 °F (37 1 °C) 100 22 -- 94/53 67 mmHg 95 % 24 1 L/min Nasal cannula 4 6 L/min 2 3 L/min/m2 8 mmHg   10/05/22 0300 -- 98 19 -- 96/54 67 mmHg 96 % -- -- -- -- -- 9 mmHg   10/05/22 0200 98 6 °F (37 °C) 99 18 -- 89/52 65 mmHg 96 % 28 2 L/min Nasal cannula 4 8 L/min 2 4 L/min/m2 8 mmHg   10/05/22 0100 98 8 °F (37 1 °C) 101 19 -- 97/55 68 mmHg 96 % -- -- -- 4 4 L/min 2 2 L/min/m2 41 mmHg   10/05/22 0000 99 °F (37 2 °C) 99 23 Abnormal  -- 86/53 65 mmHg 96 % 28 2 L/min Nasal cannula 4 1 L/min 2 L/min/m2 5 mmHg   10/04/22 2300 99 °F (37 2 °C) 98 20 -- 87/54 65 mmHg 95 % 28 2 L/min -- 4 3 L/min 2 1 L/min/m2 6 mmHg   10/04/22 2200 98 8 °F (37 1 °C) 97 21 -- 93/58 71 mmHg 96 % 32 3 L/min Nasal cannula 4 L/min 2 L/min/m2 6 mmHg   10/04/22 2100 -- 95 16 -- 108/65 80 mmHg 97 % -- -- -- 4 3 L/min 2 1 L/min/m2 28 mmHg   10/04/22 2000 99 1 °F (37 3 °C) 96 22 -- 100/62 76 mmHg 97 % 32 3 L/min Nasal cannula 4 5 L/min 2 2 L/min/m2 6 mmHg   10/04/22 1900 -- 96 25 Abnormal  -- 106/63 78 mmHg 98 % -- -- -- -- -- 4 mmHg   10/04/22 1830 -- 95 24 Abnormal  -- 84/54 65 mmHg 96 % -- -- -- 4 8 L/min 2 4 L/min/m2 4 mmHg   10/04/22 1800 98 8 °F (37 1 °C) 93 22 -- 87/53 65 mmHg 96 % -- -- -- 3 5 L/min 1 7 L/min/m2 5 mmHg   10/04/22 1700 98 8 °F (37 1 °C) 91 17 -- 117/64 78 mmHg 97 % -- -- -- 4 3 L/min 2 1 L/min/m2 7 mmHg   10/04/22 1600 97 9 °F (36 6 °C) 85 12 -- 129/66 82 mmHg 98 % -- -- -- 4 9 L/min 2 4 L/min/m2 10 mmHg   10/04/22 1551 97 5 °F (36 4 °C) 82 13 126/68 123/68 84 mmHg 98 % -- -- -- -- -- --   10/04/22 1538 97 5 °F (36 4 °C) 86 17 139/71 -- -- -- -- -- -- -- -- --   10/04/22 1535 98 5 °F (36 9 °C) 86 17 131/67 -- -- -- -- -- -- -- -- --   10/04/22 1529 -- 79 15 -- 124/65 81 mmHg 98 % -- -- -- -- -- --   10/04/22 1527 98 1 °F (36 7 °C) 77 16 122/64 -- -- -- -- -- -- -- -- --   10/04/22 1500 97 9 °F (36 6 °C) 73 16 -- 105/62 70 mmHg 98 % -- -- -- 3 3 L/min 1 6 L/min/m2 7 mmHg   10/04/22 1445 -- 80 15 -- -- -- 100 % -- -- -- -- -- --   10/04/22 1430 -- 75 16 -- -- -- 98 % -- -- -- -- -- --   10/04/22 1420 97 5 °F (36 4 °C) 72 16 96/56 110/63 77 mmHg 97 % -- -- -- -- -- --   10/04/22 1415 -- 70 17 -- -- -- 97 % -- -- -- -- -- --   10/04/22 1410 97 5 °F (36 4 °C) 71 15 88/55 Abnormal  -- -- -- -- -- -- -- -- --   10/04/22 1400 -- 69 16 -- 136/77 96 mmHg 100 % -- -- -- 3 5 L/min 1 7 L/min/m2 9 mmHg   10/04/22 0609 97 9 °F (36 6 °C) 95 16 160/87 -- -- 99 % -- -- None (Room air) -- -- --       Pertinent Labs/Diagnostic Test Results:   XR chest portable ICU   Final Result by Hafsa Piedra MD (10/04 1436)      Expected appearance after cardiac surgery with no acute disease  XR chest portable    (Results Pending)         Results from last 7 days   Lab Units 10/05/22  0411 10/04/22  1615 10/04/22  1400 10/04/22  1357 10/04/22  1248 10/04/22  1246   WBC Thousand/uL 8 86 14 90*  --   --   --   --    HEMOGLOBIN g/dL 8 5* 10 5*  --  10 9*  --   --    I STAT HEMOGLOBIN g/dl  --   --  9 5*  --   --  9 2*   HEMATOCRIT % 25 9* 32 5*  --  32 5*  --   --    HEMATOCRIT, ISTAT %  --   --  28*  --   --  27*   PLATELETS Thousands/uL 102* 134*  --  126*   < >  --     < > = values in this interval not displayed           Results from last 7 days   Lab Units 10/05/22  0411 10/04/22  1805 10/04/22  1400 10/04/22  1357 10/04/22  1246 10/04/22  1159 10/04/22  1141 10/04/22  1120 10/04/22  0836 09/30/22  0753   SODIUM mmol/L 138  --   --  139  --   --   --   --   --  137   POTASSIUM mmol/L 4 0 4 0  --  4 2  --   --   --   --   --  4 3   CHLORIDE mmol/L 109*  --   --  112*  --   --   --   --   --  108   CO2 mmol/L 24  --   --  22  --   --   --   --   --  24   CO2, I-STAT mmol/L  --   --  25  --  24 30 30 29   < >  --    ANION GAP mmol/L 5  --   --  5  --   --   --   --   --  5   BUN mg/dL 12  --   --  12  --   --   --   --   --  14   CREATININE mg/dL 0 64  --   --  0 76  --   --   --   --   --  0 88   EGFR ml/min/1 73sq m 96  --   --  89  --   --   --   --   --  84   CALCIUM mg/dL 7 5*  --   --  8 6  --   --   --   --   --  8 4   CALCIUM, IONIZED, ISTAT mmol/L  --   --  1 32  --  1 18 1 01* 1 01* 1 01*   < >  --    MAGNESIUM mg/dL 2 3  --   --   --   --   --   --   --   --   --     < > = values in this interval not displayed           Results from last 7 days   Lab Units 10/05/22  0604 10/05/22  0408 10/05/22  0203 10/05/22  0024 10/04/22  2202 10/04/22  2013 10/04/22  1757 10/04/22  1556 10/04/22  1355 10/04/22  0635   POC GLUCOSE mg/dl 144* 129 117 143* 136 139 133 153* 154* 130     Results from last 7 days   Lab Units 10/05/22  0411 10/04/22  1357 09/30/22  0753   GLUCOSE RANDOM mg/dL 131 151* 111      Results from last 7 days   Lab Units 10/05/22  0411   PH ART  7 405   PCO2 ART mm Hg 40 9   PO2 ART mm Hg 108 4   HCO3 ART mmol/L 25 1   BASE EXC ART mmol/L 0 3   O2 CONTENT ART mL/dL 12 8*   O2 HGB, ARTERIAL % 96 6   ABG SOURCE  Line, Arterial     Results from last 7 days   Lab Units 10/04/22  1400 10/04/22  1246 10/04/22  1159 10/04/22  1120 10/04/22  1101   PH, CANELO I-STAT   --   --   --   --  7 340   PCO2, CANELO ISTAT mm HG  --   --   --   --  44 7   PO2, CANELO ISTAT mm HG  --   --   --   --  51 0*   HCO3, CANELO ISTAT mmol/L  --   --   --   --  24 1   I STAT BASE EXC mmol/L -1 -2 2   < > -2   I STAT O2 SAT % 99* 99* 100*   < > 84   ISTAT PH ART  7 378 7 363 7 347*   < >  --    I STAT ART PCO2 mm HG 40 4 40 6 51 7*   < >  --    I STAT ART PO2 mm  0* 156 0* 310 0*   < >  --    I STAT ART HCO3 mmol/L 23 8 23 1 28 4*   < >  --     < > = values in this interval not displayed  Results from last 7 days   Lab Units 10/04/22  1615 10/04/22  1357   PROTIME seconds 15 7* 18 1*   INR  1 23* 1 47*   PTT seconds  --  54*     Results from last 7 days   Lab Units 10/04/22  0823   CLARITY UA  Clear   COLOR UA  Light Yellow   SPEC GRAV UA  1 022   PH UA  7 0   GLUCOSE UA mg/dl Negative   KETONES UA mg/dl Negative   BLOOD UA  Small*   PROTEIN UA mg/dl Negative   NITRITE UA  Negative   BILIRUBIN UA  Negative   UROBILINOGEN UA (BE) mg/dl <2 0   LEUKOCYTES UA  Negative   WBC UA /hpf 1-2   RBC UA /hpf 4-10*   BACTERIA UA /hpf None Seen   EPITHELIAL CELLS WET PREP /hpf None Seen   MUCUS THREADS  Occasional*     cheduled Medications:  acetaminophen, 975 mg, Oral, Q8H  amiodarone, 200 mg, Oral, Q8H LUIS ENRIQUE  aspirin, 325 mg, Oral, Daily  atorvastatin, 80 mg, Oral, Daily With Dinner  cefazolin, 2,000 mg, Intravenous, Q8H  chlorhexidine, 15 mL, Swish & Spit, BID  docusate sodium, 100 mg, Oral, BID  fondaparinux, 2 5 mg, Subcutaneous, Daily  insulin lispro, 1-5 Units, Subcutaneous, TID AC  insulin lispro, 1-5 Units, Subcutaneous, HS  mupirocin, 1 application, Nasal, B87T [facility]  pantoprazole, 40 mg, Oral, Daily  polyethylene glycol, 17 g, Oral, Daily    Continuous IV Infusions:  phenylephine,  mcg/min, Intravenous, Titrated    PRN Meds:  acetaminophen, 650 mg, Rectal, Q4H PRN  bisacodyl, 10 mg, Rectal, Daily PRN  HYDROmorphone, 0 5 mg, Intravenous, Q2H PRN  ondansetron, 4 mg, Intravenous, Q6H PRN  oxyCODONE, 2 5 mg, Oral, Q4H PRN  oxyCODONE, 5 mg, Oral, Q4H PRN        Network Utilization Review Department  ATTENTION: Please call with any questions or concerns to [phone] and carefully listen to the prompts so that you are directed to the right person   All voicemails are confidential   Ashley Guajardo all requests for admission clinical reviews, approved or denied determinations and any other requests to dedicated fax number below belonging to the campus where the patient is receiving treatment   List of dedicated fax numbers for the Facilities:  [address] DENIALS (Administrative/Medical Necessity) [phone]   [address] (Maternity/NICU/Pediatrics) [phone]   913 Elvira Hoover [phone]   Alvin Ponce  [phone]   1306 [facility] [address] [facility] [place] [address] [address] Whitney Soares 28 [phone]   [address] Yina Hernandez [facility] [place] [address] 815 [facility] [phone]

## 2022-10-05 NOTE — PHYSICAL THERAPY NOTE
Physical Therapy Evaluation     Patient's Name: Rene Meier    Admitting Diagnosis  Bicuspid aortic valve [Q23 1]  Nonrheumatic aortic valve stenosis [I35 0]  Nonrheumatic aortic valve insufficiency [I35 1]    Problem List  Patient Active Problem List   Diagnosis    Nonrheumatic aortic valve stenosis    Ascending aortic aneurysm    Nonrheumatic aortic valve insufficiency    Pre-diabetes    Hyperlipidemia    Bicuspid aortic valve    S/P CABG x 1    Coronary artery disease    S/P AVR (aortic valve replacement)       Past Medical History  Past Medical History:   Diagnosis Date    Aortic stenosis     Ascending aortic aneurysm     Hyperlipidemia     Hypertension     Nonrheumatic aortic valve insufficiency     Osteoarthritis     Prostatitis        Past Surgical History  Past Surgical History:   Procedure Laterality Date    CARDIAC CATHETERIZATION N/A 9/30/2022    Procedure: Cardiac RHC/LHC; Surgeon: Lyndsay Glover MD;  Location: [facility]; Service: Cardiology    COLONOSCOPY  08/09/2017    CORONARY ARTERY BYPASS GRAFT N/A 10/4/2022    Procedure: CABG X 1, LIMA TO LAD;  Surgeon: Eliseo Toro MD;  Location: [facility];  Service: Cardiac Surgery    EYE MUSCLE SURGERY      stigmatism correction age 13    INGUINAL HERNIA REPAIR Left     ME RPLCMT AORTIC VALVE OPN W/STENTLESS TISSUE VALVE N/A 10/4/2022    Procedure: REPLACEMENT VALVE AORTIC (AVR) INSPIRIS RESILIA 25MM; Surgeon: Eliseo Toro MD;  Location: [facility];  Service: Cardiac Surgery    SKIN CANCER EXCISION      TONSILECTOMY AND ADNOIDECTOMY            10/05/22 1007   PT Last Visit   PT Visit Date 10/05/22   Note Type   Note type Evaluation  (and Tx)   Pain Assessment   Pain Assessment Tool FLACC   Pain Location/Orientation Orientation: Mid;Location: Chest;Location: Incision   Pain Onset/Description Onset: Ongoing;Frequency: Intermittent; Descriptor: Aching;Descriptor: Discomfort   Effect of Pain on Daily Activities guarding   Patient's Stated Pain Goal No pain   Hospital Pain Intervention(s) Repositioned; Ambulation/increased activity; Emotional support   Pain Rating: FLACC (Rest) - Face 0   Pain Rating: FLACC (Rest) - Legs 0   Pain Rating: FLACC (Rest) - Activity 0   Pain Rating: FLACC (Rest) - Cry 0   Pain Rating: FLACC (Rest) - Consolability 0   Score: FLACC (Rest) 0   Pain Rating: FLACC (Activity) - Face 1   Pain Rating: FLACC (Activity) - Legs 0   Pain Rating: FLACC (Activity) - Activity 0   Pain Rating: FLACC (Activity) - Cry 1   Pain Rating: FLACC (Activity) - Consolability 1   Score: FLACC (Activity) 3   Restrictions/Precautions   Braces or Orthoses   (denies)   Other Precautions Cardiac/sternal;Multiple lines;Telemetry;O2  (a-line; CT)   Home Living   Type of Home House   Home Layout Two level  (1 JADEN)   Prior Function   Level of [place] Independent with ADLs and functional mobility  (amb w/o AD)   Lives With Spouse   Vocational Full time employment   General   Additional Pertinent History cleared for assessment (spoke to nsg)   Cognition   Overall Cognitive Status WFL   Arousal/Participation Alert   Orientation Level Oriented to person;Oriented to place;Oriented to situation   Memory Decreased recall of precautions   Following Commands Follows one step commands without difficulty   Subjective   Subjective Alert; in the chair; agreeable to mobilize   RUE Assessment   RUE Assessment WFL  (AROM)   LUE Assessment   LUE Assessment WFL  (AROM)   RLE Assessment   RLE Assessment WFL  (AROM)   Strength RLE   RLE Overall Strength   (fair +/ good -)   LLE Assessment   LLE Assessment WFL  (AROM)   Strength LLE   LLE Overall Strength   (fair +/ good -)   Transfers   Sit to Stand 4  Minimal assistance   Additional items Assist x 1; Increased time required;Verbal cues   Stand to Sit 4  Minimal assistance   Additional items Assist x 1; Increased time required;Verbal cues   Ambulation/Elevation   Gait pattern Not appropriate; Not tested  (dizziness upon standing w/ decreased BP per a-line; RN is present and restarted emerald Howell   Assistive Device Rolling walker   Balance   Static Sitting Fair   Dynamic Sitting Fair -   Static Standing Poor +   Dynamic Standing Poor +   Activity Tolerance   Activity Tolerance Treatment limited secondary to medical complications (Comment)  (dizziness and decreased BP)   Medical Staff Made Aware Co-eval performed w/ OTR due to complexity of medical status   Nurse Made Aware spoke to CARSON Erickson   Assessment   Prognosis Good   Problem List Decreased strength;Decreased endurance; Impaired balance;Decreased mobility   Assessment Pt is 74 y o  male admitted with Dx of symptomatic severe aortic stenosis, Aortic valve insufficiency and Bicuspid aortic valve, and single vessel coronary artery disease and underwent Aortic valve replacement with a 25 mm Mckenna Inspiris Resilia bioprosthetic valve and Coronary artery bypass grafting x 1 with DALE to LAD on 10/4/2022  Pt 's comorbidities affecting POC include: Ascending aortic aneurysm, Hypertension, and Osteoarthritis and personal factors of: JADEN and steps in the house  Pt's clinical presentation is currently unstable/unpredictable which is evident in ongoing telemetry monitoring while in a critical care unit w/ a-line in place, abnormal lab values being monitored/trending, CT in place and inability to progress further w/ mobilization/amb at this time due to dizziness and decreased BP  Pt presents w/ generalized weakness, incl decreased LE strength, decreased functional endurance and activity tolerance, impaired balance, transfers difficulty and fall risk  Will cont to follow pt in PT for progressive mobilization to address above functional deficits and to max level of (I), endurance, and safety   Otherwise, anticipate pt will return home w/ available family support upon D/C provided he cont improving w/ mobility skills, safety, and endurance (incl on the steps) and when medically cleared; home PT follow up is recommended at this time; will follow  Barriers to Discharge Inaccessible home environment   Goals   Patient Goals to feel better   STG Expiration Date 10/15/22   Short Term Goal #1 7-10 days  Pt will amb 400 ft w/o assistive device, (I) in order to facilitate safe return to premorbid environment and community amb status  Pt will negotiate 12 steps w/ hand rail, mod (I) in order to navigate between levels of home environment safely  Pt will negotiate 1 steps w/o hand rail and w/ SPC PRN, (S)x1 in order to assure safe navigation in and out of the premorbid living environment  Pt will achieve (I) level w/ bed mob in order to facilitate safety with OOB and back to bed transitions in own living environment  Pt will perform transfers w/ mod (I) to assure (I) and safety w/ transitions during aspects of mobility/locomotion  Pt will participate in LE therex and balance activities to max progression w/ mobility skills  PT Treatment Day 0   Plan   Treatment/Interventions Functional transfer training;LE strengthening/ROM; Elevations; Therapeutic exercise; Endurance training;Equipment eval/education; Bed mobility;Gait training;Spoke to nursing;Spoke to case management;OT   PT Frequency 4-6x/wk   Recommendation   PT Discharge Recommendation Home with home health rehabilitation   Equipment Recommended Walker  (at this time)   Carlos Manuel 74 walker   Henri Briseno 435   Turning in Bed Without Bedrails 3   Lying on Back to Sitting on Edge of Flat Bed 2   Moving Bed to Chair 3   Standing Up From Chair 3   Walk in Room 2   Climb 3-5 Stairs 1   Basic Mobility Inpatient Raw Score 14   Basic Mobility Standardized Score 35 55   Highest Level Of Mobility   JH-HLM Goal 4: Move to chair/commode   JH-HLM Achieved 4: Move to chair/commode   Modified [place] Scale   Modified [place] Scale 4   Additional Treatment Session   Start Time 1008   End Time 1019   Treatment Assessment Additional follow up consecutive session performed to progress further w/ mobilization/ambulation distance to max overall strength and endurance and to facilitate progression w/ functional mobility skills and overall level of (I); after seated rest period, Sit <--> stand transfers min (A)x1; amb 50 ft w/ rw, min (A)x1 and stand by (A) of 2 more staff for lines and chair follow; pt was reclined in the chair w/ LE elevated at the end of session;   Equipment Use rw   Additional Treatment Day 1   End of Consult   Patient Position at End of Consult Bedside chair; All needs within [facility]

## 2022-10-05 NOTE — PROGRESS NOTES
Progress Note - Critical Care   Josr Castro 74 y o  male MRN: [id number]  Unit/Bed#: [facility] 415-01 Encounter: [id number]      Attending Physician: José Miguel Castellano MD    24 Hour Events/HPI: POD # 1 s/p bioprosthetic AVR and CABG x 1  Initially high CT output, received 2 FFP, 1 cryo and amicar post-op with improvement  On/off low dose Neosynephrine throughout the night  Delined this AM with last CI 2 3    ROS: Review of Systems   Respiratory: Positive for shortness of breath (secondary to incisional pain)  Cardiovascular: Positive for chest pain (incisional)  Negative for palpitations and leg swelling  Gastrointestinal: Negative for abdominal distention, abdominal pain, nausea and vomiting  Neurological: Negative for light-headedness and headaches      ---------------------------------------------------------------------------------------------------------------------------------------------------------------------  Impressions:  1  Severe AS s/p tissue AVR  2  Bicuspid AV  3  CAD s/p CABG x 1  4  HTN  5  HLD  6  Pre-diabetes    Plan:    Neuro:   · Pain controlled with: ATC tylenol, PRN oxycodone and dilaudid  · Regulate sleep/wake cycle  · Delirium precautions  · CAM-ICU daily  · Trend neuro exam    CV:   · Cardiac infusions: Neosynephrine, 10 mcg/min   · Wean emerald as tolerated   · MAP goal > 65 and CI >2 2  · Discontinue PA catheter  · Keep arterial line  · Rhythm: NSR  · Follow rhythm on telemetry  · Hold Lopressor with borderline hypotension   · Maintain epicardial pacing wires for POD 1 PRN pacing requirements   ·  mg QD  · Statin: Lipitor 80mg qHS  · Amiodarone 200 mg PO q8 hours     Lung:   · Acute post-op pulmonary insufficiency; Requiring 2 Liters via nasal cannula, secondary to poor inspiratory effort secondary to pain  Continue incentive spirometry/coughing/deep breathing exercises    Wean supplemental oxygen as tolerated for saturation > 90%  · Wean O2 as tolerated  · Chest tube output remains persistently high; Continue chest tubes to suction today    GI:   · Continue PPI for stress ulcer prophylaxis  · Continue bowel regimen  · Trend abdominal exam and bowel function    FEN:   · Diuretic plan: Lasix 40 mg PRN UOP   · K-dur 20 mEQ PO q QD  · Nutrition/diet plan: Cardiac diet   · Replenish electrolytes with goals: K >4 0, Mag >2 0, and Phos >3 0    :   · Indwelling Aguiar present: yes   · Discontinue Aguiar  · Trend UOP and BUN/creat  · Strict I and O    ID:   · Trend temps and WBC count  · Maintain normothermia        Heme:   · Trend hgb and plts    Endo:   · Glycemic control plan: Glucose well-controlled  Discontinue continuous insulin infusion and add Insulin sliding scale coverage    Results from last 6 Months   Lab Units 09/23/22  0851 04/12/22  0835   HEMOGLOBIN A1C % 5 7* 5 9*     MSK/Skin:  · Mobility goal: OOB TID, ambulate   · PT consult: yes  · OT consult: yes  · Frequent turning and pressure off-loading  · Local wound care as needed    Disposition:  Transfer to SD level 1  ---------------------------------------------------------------------------------------------------------------------------------------------------------------------  Allergies:    Allergies   Allergen Reactions   • Pollen Extract Nasal Congestion       Medications:     VTE Pharmacologic Prophylaxis: Fondaparinux (Arixtra)  VTE Mechanical Prophylaxis: sequential compression device    Scheduled Meds:   acetaminophen, 975 mg, Q8H  amiodarone, 200 mg, Q8H LUIS ENRIQUE  aspirin, 325 mg, Daily  atorvastatin, 80 mg, Daily With Dinner  calcium chloride, 1 g, Once  cefazolin, 2,000 mg, Q8H  chlorhexidine, 15 mL, BID  fentanyl citrate (PF), 50 mcg, Once  fondaparinux, 2 5 mg, Daily  mupirocin, 1 application, W50D LUIS ENRIQUE  pantoprazole, 40 mg, Daily  polyethylene glycol, 17 g, Daily       Continuous Infusions:  insulin regular (HumuLIN R,NovoLIN R) infusion, 0 3-21 Units/hr, Last Rate: 3 Units/hr (10/05/22 0604)  niCARdipine, 2 5-15 mg/hr, Last Rate: Stopped (10/04/22 1816)  phenylephine,  mcg/min, Last Rate: 10 mcg/min (10/05/22 0651)  sodium chloride, 20 mL/hr, Last Rate: 20 mL/hr (10/04/22 2000)      PRN Meds:  acetaminophen, 650 mg, Q4H PRN  bisacodyl, 10 mg, Daily PRN  fentanyl citrate (PF), 50 mcg, Q1H PRN  furosemide, 40 mg, Q6H PRN  HYDROmorphone, 0 5 mg, Q2H PRN  lidocaine (cardiac), 100 mg, Q30 Min PRN  ondansetron, 4 mg, Q6H PRN  oxyCODONE, 2 5 mg, Q4H PRN  oxyCODONE, 5 mg, Q4H PRN  potassium chloride, 20 mEq, Once PRN  potassium chloride, 20 mEq, Q1H PRN  potassium chloride, 20 mEq, Q30 Min PRN      ---------------------------------------------------------------------------------------------------------------------------------------------------------------------  Vitals:   Vitals:    10/05/22 0300 10/05/22 0400 10/05/22 0549 10/05/22 0600   BP:       Pulse: 98 100  100   Resp:    Temp:  98 8 °F (37 1 °C)     TempSrc:  Core     SpO2: 96% 95%  95%   Weight:   74 9 kg (165 lb 2 oz)    Height:         Arterial Line:  Arterial Line BP: 123/64  Arterial Line MAP (mmHg): 84 mmHg    Tele Rhythm: NSR (This was personally reviewed by myself )    Respiratory:  SpO2: SpO2: 95 %  SpO2 Device: O2 Device: Nasal cannula  Nasal Cannula O2 Flow Rate (L/min): 2 L/min    Ventilator:  Respiratory  Report   Lab Data (Last 4 hours)      10/05 0411            pH, Arterial       7 405             pCO2, Arterial       40 9             pO2, Arterial       108 4             HCO3, Arterial       25 1             Base Excess, Arterial       0 3                  O2/Vent Data     None              Temperature: Temp (24hrs), Av 4 °F (36 9 °C), Min:97 5 °F (36 4 °C), Max:99 1 °F (37 3 °C)  Current: Temperature: 98 8 °F (37 1 °C)    Weights:   Weight (last 2 days)     Date/Time Weight    10/05/22 0549 74 9 (165 12)    10/04/22 0617 83 (183)        Body mass index is 23 03 kg/m²      Hemodynamic Monitoring:  PAP: PAP: 26/11  CVP: CVP (mean): 8 mmHg  CO: CO (L/min): 4 6 L/min  CI: CI (L/min/m2): 2 3 L/min/m2  SVR: SVR (dyne*sec)/cm5: 1023 (dyne*sec)/cm5    Intake and Outputs:    Intake/Output Summary (Last 24 hours) at 10/5/2022 0655  Last data filed at 10/5/2022 0600  Gross per 24 hour   Intake 9885 05 ml   Output 5980 ml   Net 3905 05 ml     I/O last 24 hours: In: 9885 1 [I V :5600 1; Blood:1435; IV SQHZMPANO:9905]  Out: 1569 [Urine:3780; Blood:500; Chest Tube:1700]    UOP: 157/hour   BM: 0 in the last 24 hours    Chest tube Output:  Mediastinal tubes: 210 mL/8 hours  960 mL/24 hours   Pleural tubes: 160 mL/8 hours  740 mL/24 hours     Labs:  Results from last 7 days   Lab Units 10/05/22  0411 10/04/22  1615 10/04/22  1400 10/04/22  1357   WBC Thousand/uL 8 86 14 90*  --   --    HEMOGLOBIN g/dL 8 5* 10 5*  --  10 9*   I STAT HEMOGLOBIN g/dl  --   --  9 5*  --    HEMATOCRIT % 25 9* 32 5*  --  32 5*   HEMATOCRIT, ISTAT %  --   --  28*  --    PLATELETS Thousands/uL 102* 134*  --  126*     Results from last 7 days   Lab Units 10/05/22  0411 10/04/22  1805 10/04/22  1400 10/04/22  1357 10/04/22  1246 10/04/22  1159 10/04/22  0836 09/30/22  0753   SODIUM mmol/L 138  --   --  139  --   --   --  137   POTASSIUM mmol/L 4 0 4 0  --  4 2  --   --   --  4 3   CHLORIDE mmol/L 109*  --   --  112*  --   --   --  108   CO2 mmol/L 24  --   --  22  --   --   --  24   CO2, I-STAT mmol/L  --   --  25  --  24 30   < >  --    BUN mg/dL 12  --   --  12  --   --   --  14   CREATININE mg/dL 0 64  --   --  0 76  --   --   --  0 88   CALCIUM mg/dL 7 5*  --   --  8 6  --   --   --  8 4   GLUCOSE, ISTAT mg/dl  --   --  146*  --  187* 200*   < >  --     < > = values in this interval not displayed       Baseline Creat: 0 8    Results from last 7 days   Lab Units 10/05/22  0411   MAGNESIUM mg/dL 2 3          Results from last 7 days   Lab Units 10/04/22  1615 10/04/22  1357   INR  1 23* 1 47*   PTT seconds  --  54*         Results from last 7 days   Lab Units 10/05/22  0411   PH ART  7 405   PCO2 ART mm Hg 40 9   PO2 ART mm Hg 108 4   HCO3 ART mmol/L 25 1   BASE EXC ART mmol/L 0 3   ABG SOURCE  Line, Arterial         Micro:   Blood Culture: No results found for: BLOODCX  Urine Culture: No results found for: URINECX  Sputum Culture: No components found for: SPUTUMCX  Wound Culure: No results found for: WOUNDCULT    Diagnositic Studies:  10/05/22 CXR: Lines in appropriate position, low lung volumes, small L effusion  This was personally reviewed by myself in PACS  10/05/22 EKG: Sinus tachycardia rate 105 with 1st degree AV block, T wave inversions I, II, aVL, V4-V6  This was personally reviewed by myself  Nutrition:        Diet Orders   (From admission, onward)             Start     Ordered    10/05/22 0000  Diet Cardiovascular; Cardiac; Fluid Restriction 1800 ML  Diet effective 0500        References:    Nutrtion Support Algorithm Enteral vs  Parenteral   Question Answer Comment   Diet Type Cardiovascular    Cardiac Cardiac    Other Restriction(s): Fluid Restriction 1800 ML    RD to adjust diet per protocol? Yes        10/04/22 1355              Physical Exam  Vitals reviewed  Constitutional:       General: He is awake  He is not in acute distress  Interventions: Nasal cannula in place  HENT:      Head: Normocephalic and atraumatic  Eyes:      Pupils: Pupils are equal, round, and reactive to light  Neck:      Comments: Right IJ triple-lumen catheter in place  Cardiovascular:      Rate and Rhythm: Normal rate and regular rhythm  Heart sounds: Normal heart sounds  Arteriovenous access: right arteriovenous access is present  Pulmonary:      Effort: Pulmonary effort is normal  No tachypnea or accessory muscle usage  Breath sounds: Normal breath sounds  Comments: Midline sternotomy incision with dressing in place   CT in place with bloody drainage  Abdominal:      General: There is no distension  Palpations: Abdomen is soft  Tenderness:  There is no abdominal tenderness  Musculoskeletal:      Right lower leg: No edema  Left lower leg: No edema  Skin:     General: Skin is warm and dry  Neurological:      Mental Status: He is alert and oriented to person, place, and time  Invasive lines and devices: Invasive Devices  Report    Central Venous Catheter Line  Duration           CVC Central Lines 10/04/22 Triple <1 day          Peripheral Intravenous Line  Duration           Peripheral IV 10/04/22 Right Hand <1 day          Arterial Line  Duration           Arterial Line 10/04/22 Left Radial <1 day          Line  Duration           Pacer Wires <1 day    Pacer Wires <1 day          Drain  Duration           Chest Tube 1 Left Pleural 32 Fr  <1 day    Chest Tube 2 Posterior;Mediastinal 32 Fr  <1 day    Chest Tube 3 Anterior;Mediastinal 32 Fr  <1 day    Urethral Catheter Non-latex;Straight-tip; Temperature probe 16 Fr  <1 day              ---------------------------------------------------------------------------------------------------------------------------------------------------------------------  Code Status: Level 1 - Full Code    Care Time Delivered:   No Critical Care time spent     Collaborative bedside rounds performed with cardiac surgery attending, critical care attending and bedside RN      SIGNATURE: Sana Thompson PA-C  DATE: October 5, 2022  TIME: 6:55 AM

## 2022-10-05 NOTE — PLAN OF CARE
Problem: OCCUPATIONAL THERAPY ADULT  Goal: Performs self-care activities at highest level of function for planned discharge setting  See evaluation for individualized goals  Description: Treatment Interventions: ADL retraining, Functional transfer training, Endurance training, Patient/family training, Equipment evaluation/education, Compensatory technique education, Continued evaluation, Cardiac education, Energy conservation, Activityengagement          See flowsheet documentation for full assessment, interventions and recommendations  Note: Limitation: Decreased ADL status, Decreased endurance, Decreased self-care trans, Decreased high-level ADLs  Prognosis: Good  Assessment: Pt is a 74 y o  male admitted to [facility] on 10/4/2022 w/ bicuspid aortic valve s/p AVR and CABG x1 on 10/4/2022  Pt  has a past medical history of Aortic stenosis, Ascending aortic aneurysm, Hyperlipidemia, Hypertension, Nonrheumatic aortic valve insufficiency, Osteoarthritis, and Prostatitis  Pt with active OT orders and ambulate  orders  Pt resides in a 2 story home with 1 JADEN  Pt lives with his wife who is able to assist as needed upon d/c  Pt was I w/  ADLS and IADLS, (+) drove, & required no use of DME PTA  Currently pt is min A for functional transfers and functional mobility, and mod A for ADLS overall  Pt is limited at this time 2*: pain, endurance, activity tolerance, functional mobility, forward functional reach, functional standing tolerance and decreased I w/ ADLS/IADLS  The following Occupational Performance Areas to address include: grooming, bathing/shower, toilet hygiene, dressing, functional mobility and clothing management  Based on the aforementioned OT evaluation, functional performance deficits, and assessments, pt has been identified as a high complexity evaluation  From OT standpoint, anticipate d/c home with family support   Pt to continue to benefit from acute immediate OT services to address the following goals 3-5x/week to  w/in 10-14 days:        OT Discharge Recommendation: No rehabilitation needs (Home with increased social support)

## 2022-10-05 NOTE — PLAN OF CARE
Problem: PHYSICAL THERAPY ADULT  Goal: Performs mobility at highest level of function for planned discharge setting  See evaluation for individualized goals  Description: Treatment/Interventions: Functional transfer training, LE strengthening/ROM, Elevations, Therapeutic exercise, Endurance training, Equipment eval/education, Bed mobility, Gait training, Spoke to nursing, Spoke to case management, OT  Equipment Recommended: [employer] (at this time)       See flowsheet documentation for full assessment, interventions and recommendations  Note: Prognosis: Good  Problem List: Decreased strength, Decreased endurance, Impaired balance, Decreased mobility  Assessment: Pt is 74 y o  male admitted with Dx of symptomatic severe aortic stenosis, Aortic valve insufficiency and Bicuspid aortic valve, and single vessel coronary artery disease and underwent Aortic valve replacement with a 25 mm Mckenna Inspiris Resilia bioprosthetic valve and Coronary artery bypass grafting x 1 with DALE to LAD on 10/4/2022  Pt 's comorbidities affecting POC include: Ascending aortic aneurysm, Hypertension, and Osteoarthritis and personal factors of: JADEN and steps in the house  Pt's clinical presentation is currently unstable/unpredictable which is evident in ongoing telemetry monitoring while in a critical care unit w/ a-line in place, abnormal lab values being monitored/trending, CT in place and inability to progress further w/ mobilization/amb at this time due to dizziness and decreased BP  Pt presents w/ generalized weakness, incl decreased LE strength, decreased functional endurance and activity tolerance, impaired balance, transfers difficulty and fall risk  Will cont to follow pt in PT for progressive mobilization to address above functional deficits and to max level of (I), endurance, and safety   Otherwise, anticipate pt will return home w/ available family support upon D/C provided he cont improving w/ mobility skills, safety, and endurance (incl on the steps) and when medically cleared; home PT follow up is recommended at this time; will follow  Barriers to Discharge: Inaccessible home environment     PT Discharge Recommendation: Home with home health rehabilitation    See flowsheet documentation for full assessment

## 2022-10-05 NOTE — RESPIRATORY THERAPY NOTE
RT Protocol Note  Sophie Preciado 74 y o  male MRN: [id number]  Unit/Bed#: [facility] 415-01 Encounter: [id number]    Assessment    Principal Problem:    Bicuspid aortic valve  Active Problems:    Nonrheumatic aortic valve stenosis    Ascending aortic aneurysm    Nonrheumatic aortic valve insufficiency    Pre-diabetes    Hyperlipidemia    S/P CABG x 1    Coronary artery disease    S/P AVR (aortic valve replacement)      Home Pulmonary Medications:  none       Past Medical History:   Diagnosis Date    Aortic stenosis     Ascending aortic aneurysm     Hyperlipidemia     Hypertension     Nonrheumatic aortic valve insufficiency     Osteoarthritis     Prostatitis      Social History     Socioeconomic History    Marital status: /Civil Union     Spouse name: None    Number of children: None    Years of education: None    Highest education level: None   Occupational History    None   Tobacco Use    Smoking status: Never Smoker    Smokeless tobacco: Never Used   Vaping Use    Vaping Use: Never used   Substance and Sexual Activity    Alcohol use: Not Currently     Alcohol/week: 28 0 standard drinks     Types: 14 Glasses of wine, 14 Shots of liquor per week     Comment: 2 small glasses of wine & 2 shots whiskey daily    Drug use: Not Currently    Sexual activity: Yes     Partners: Female   Other Topics Concern    None   Social History Narrative    None     Social Determinants of Health     Financial Resource Strain: Not on file   Food Insecurity: Not on file   Transportation Needs: Not on file   Physical Activity: Not on file   Stress: Not on file   Social Connections: Not on file   Intimate Partner Violence: Not on file   Housing Stability: Not on file       Subjective         Objective    Physical Exam:   Assessment Type: (P) Assess only  General Appearance: (P) Awake, Alert  Respiratory Pattern: (P) Normal  Chest Assessment: (P) Chest expansion symmetrical  Bilateral Breath Sounds: (P) Clear, Diminished    Vitals:  Blood pressure 92/64, pulse 99, temperature 98 6 °F (37 °C), temperature source Oral, resp  rate 19, height 5' 11" (1 803 m), weight 74 9 kg (165 lb 2 oz), SpO2 96 %  Results from last 7 days   Lab Units 10/05/22  0411   PH ART  7 405   PCO2 ART mm Hg 40 9   PO2 ART mm Hg 108 4   HCO3 ART mmol/L 25 1   BASE EXC ART mmol/L 0 3   O2 CONTENT ART mL/dL 12 8*   O2 HGB, ARTERIAL % 96 6   ABG SOURCE  Line, Arterial       Imaging and other studies: I have personally reviewed pertinent reports  Plan    Respiratory Plan: (P) Discontinue Protocol  Airway Clearance Plan: (P) Incentive Spirometer     Resp Comments: (P) pt has no pulmonary hx and does not take any respiratory meds @ home will d/c from respiratory protocol will conitnue with airway clearance protocol at this time with IS

## 2022-10-06 ENCOUNTER — HOME HEALTH ADMISSION (OUTPATIENT)
Dept: HOME HEALTH SERVICES | Facility: HOME HEALTHCARE | Age: 72
End: 2022-10-06
Payer: COMMERCIAL

## 2022-10-06 LAB
ANION GAP SERPL CALCULATED.3IONS-SCNC: 6 MMOL/L (ref 4–13)
ATRIAL RATE: 105 BPM
ATRIAL RATE: 122 BPM
ATRIAL RATE: 73 BPM
BUN SERPL-MCNC: 18 MG/DL (ref 5–25)
CA-I BLD-SCNC: 1.01 MMOL/L (ref 1.12–1.32)
CALCIUM SERPL-MCNC: 7.9 MG/DL (ref 8.3–10.1)
CHLORIDE SERPL-SCNC: 102 MMOL/L (ref 96–108)
CO2 SERPL-SCNC: 24 MMOL/L (ref 21–32)
CREAT SERPL-MCNC: 0.8 MG/DL (ref 0.6–1.3)
ERYTHROCYTE [DISTWIDTH] IN BLOOD BY AUTOMATED COUNT: 12.9 % (ref 11.6–15.1)
GFR SERPL CREATININE-BSD FRML MDRD: 88 ML/MIN/1.73SQ M
GLUCOSE SERPL-MCNC: 157 MG/DL (ref 65–140)
GLUCOSE SERPL-MCNC: 168 MG/DL (ref 65–140)
GLUCOSE SERPL-MCNC: 170 MG/DL (ref 65–140)
GLUCOSE SERPL-MCNC: 180 MG/DL (ref 65–140)
GLUCOSE SERPL-MCNC: 192 MG/DL (ref 65–140)
HCT VFR BLD AUTO: 26.5 % (ref 36.5–49.3)
HGB BLD-MCNC: 8.8 G/DL (ref 12–17)
INR PPP: 1.37 (ref 0.84–1.19)
MCH RBC QN AUTO: 32.1 PG (ref 26.8–34.3)
MCHC RBC AUTO-ENTMCNC: 33.2 G/DL (ref 31.4–37.4)
MCV RBC AUTO: 97 FL (ref 82–98)
P AXIS: 21 DEGREES
P AXIS: 74 DEGREES
PLATELET # BLD AUTO: 121 THOUSANDS/UL (ref 149–390)
PMV BLD AUTO: 11.7 FL (ref 8.9–12.7)
POTASSIUM SERPL-SCNC: 3.8 MMOL/L (ref 3.5–5.3)
PR INTERVAL: 212 MS
PR INTERVAL: 236 MS
PROTHROMBIN TIME: 17.1 SECONDS (ref 11.6–14.5)
QRS AXIS: -15 DEGREES
QRS AXIS: -26 DEGREES
QRS AXIS: 0 DEGREES
QRSD INTERVAL: 80 MS
QRSD INTERVAL: 82 MS
QRSD INTERVAL: 82 MS
QT INTERVAL: 324 MS
QT INTERVAL: 360 MS
QT INTERVAL: 438 MS
QTC INTERVAL: 475 MS
QTC INTERVAL: 482 MS
QTC INTERVAL: 482 MS
RBC # BLD AUTO: 2.74 MILLION/UL (ref 3.88–5.62)
SODIUM SERPL-SCNC: 132 MMOL/L (ref 135–147)
T WAVE AXIS: 168 DEGREES
T WAVE AXIS: 6 DEGREES
T WAVE AXIS: 98 DEGREES
VENTRICULAR RATE: 105 BPM
VENTRICULAR RATE: 133 BPM
VENTRICULAR RATE: 73 BPM
WBC # BLD AUTO: 13.57 THOUSAND/UL (ref 4.31–10.16)

## 2022-10-06 PROCEDURE — 88311 DECALCIFY TISSUE: CPT | Performed by: SPECIALIST

## 2022-10-06 PROCEDURE — 99233 SBSQ HOSP IP/OBS HIGH 50: CPT | Performed by: STUDENT IN AN ORGANIZED HEALTH CARE EDUCATION/TRAINING PROGRAM

## 2022-10-06 PROCEDURE — 99024 POSTOP FOLLOW-UP VISIT: CPT | Performed by: THORACIC SURGERY (CARDIOTHORACIC VASCULAR SURGERY)

## 2022-10-06 PROCEDURE — 97530 THERAPEUTIC ACTIVITIES: CPT

## 2022-10-06 PROCEDURE — 82330 ASSAY OF CALCIUM: CPT | Performed by: PHYSICIAN ASSISTANT

## 2022-10-06 PROCEDURE — 93010 ELECTROCARDIOGRAM REPORT: CPT | Performed by: INTERNAL MEDICINE

## 2022-10-06 PROCEDURE — 88313 SPECIAL STAINS GROUP 2: CPT | Performed by: SPECIALIST

## 2022-10-06 PROCEDURE — NC001 PR NO CHARGE: Performed by: STUDENT IN AN ORGANIZED HEALTH CARE EDUCATION/TRAINING PROGRAM

## 2022-10-06 PROCEDURE — 82948 REAGENT STRIP/BLOOD GLUCOSE: CPT

## 2022-10-06 PROCEDURE — 80048 BASIC METABOLIC PNL TOTAL CA: CPT | Performed by: PHYSICIAN ASSISTANT

## 2022-10-06 PROCEDURE — 85027 COMPLETE CBC AUTOMATED: CPT | Performed by: PHYSICIAN ASSISTANT

## 2022-10-06 PROCEDURE — 85610 PROTHROMBIN TIME: CPT | Performed by: PHYSICIAN ASSISTANT

## 2022-10-06 PROCEDURE — 88305 TISSUE EXAM BY PATHOLOGIST: CPT | Performed by: SPECIALIST

## 2022-10-06 RX ORDER — ASPIRIN 81 MG/1
81 TABLET, CHEWABLE ORAL DAILY
Status: DISCONTINUED | OUTPATIENT
Start: 2022-10-06 | End: 2022-10-10 | Stop reason: HOSPADM

## 2022-10-06 RX ORDER — FUROSEMIDE 10 MG/ML
40 INJECTION INTRAMUSCULAR; INTRAVENOUS ONCE
Status: COMPLETED | OUTPATIENT
Start: 2022-10-06 | End: 2022-10-06

## 2022-10-06 RX ORDER — WARFARIN SODIUM 5 MG/1
5 TABLET ORAL
Status: COMPLETED | OUTPATIENT
Start: 2022-10-06 | End: 2022-10-06

## 2022-10-06 RX ORDER — FUROSEMIDE 10 MG/ML
40 INJECTION INTRAMUSCULAR; INTRAVENOUS
Status: DISCONTINUED | OUTPATIENT
Start: 2022-10-06 | End: 2022-10-07

## 2022-10-06 RX ADMIN — POLYETHYLENE GLYCOL 3350 17 G: 17 POWDER, FOR SOLUTION ORAL at 08:25

## 2022-10-06 RX ADMIN — INSULIN LISPRO 1 UNITS: 100 INJECTION, SOLUTION INTRAVENOUS; SUBCUTANEOUS at 11:18

## 2022-10-06 RX ADMIN — ACETAMINOPHEN 975 MG: 325 TABLET, FILM COATED ORAL at 14:20

## 2022-10-06 RX ADMIN — FONDAPARINUX SODIUM 2.5 MG: 2.5 INJECTION, SOLUTION SUBCUTANEOUS at 08:25

## 2022-10-06 RX ADMIN — DOCUSATE SODIUM 100 MG: 100 CAPSULE, LIQUID FILLED ORAL at 08:25

## 2022-10-06 RX ADMIN — OXYCODONE HYDROCHLORIDE 5 MG: 5 TABLET ORAL at 08:25

## 2022-10-06 RX ADMIN — ASPIRIN 81 MG CHEWABLE TABLET 81 MG: 81 TABLET CHEWABLE at 08:25

## 2022-10-06 RX ADMIN — INSULIN LISPRO 1 UNITS: 100 INJECTION, SOLUTION INTRAVENOUS; SUBCUTANEOUS at 21:06

## 2022-10-06 RX ADMIN — FUROSEMIDE 40 MG: 10 INJECTION, SOLUTION INTRAMUSCULAR; INTRAVENOUS at 02:29

## 2022-10-06 RX ADMIN — ACETAMINOPHEN 975 MG: 325 TABLET, FILM COATED ORAL at 05:06

## 2022-10-06 RX ADMIN — AMIODARONE HYDROCHLORIDE 200 MG: 200 TABLET ORAL at 14:20

## 2022-10-06 RX ADMIN — PHENYLEPHRINE HYDROCHLORIDE 40 MCG/MIN: 10 INJECTION INTRAVENOUS at 05:21

## 2022-10-06 RX ADMIN — ATORVASTATIN CALCIUM 80 MG: 80 TABLET, FILM COATED ORAL at 17:46

## 2022-10-06 RX ADMIN — Medication 12.5 MG: at 08:25

## 2022-10-06 RX ADMIN — ACETAMINOPHEN 975 MG: 325 TABLET, FILM COATED ORAL at 21:07

## 2022-10-06 RX ADMIN — AMIODARONE HYDROCHLORIDE 200 MG: 200 TABLET ORAL at 05:05

## 2022-10-06 RX ADMIN — PANTOPRAZOLE SODIUM 40 MG: 40 TABLET, DELAYED RELEASE ORAL at 05:06

## 2022-10-06 RX ADMIN — CHLORHEXIDINE GLUCONATE 15 ML: 1.2 SOLUTION ORAL at 21:06

## 2022-10-06 RX ADMIN — FUROSEMIDE 40 MG: 10 INJECTION, SOLUTION INTRAMUSCULAR; INTRAVENOUS at 17:46

## 2022-10-06 RX ADMIN — MUPIROCIN 1 APPLICATION: 20 OINTMENT TOPICAL at 21:07

## 2022-10-06 RX ADMIN — AMIODARONE HYDROCHLORIDE 200 MG: 200 TABLET ORAL at 21:07

## 2022-10-06 RX ADMIN — INSULIN LISPRO 1 UNITS: 100 INJECTION, SOLUTION INTRAVENOUS; SUBCUTANEOUS at 06:06

## 2022-10-06 RX ADMIN — DOCUSATE SODIUM 100 MG: 100 CAPSULE, LIQUID FILLED ORAL at 17:46

## 2022-10-06 RX ADMIN — WARFARIN SODIUM 5 MG: 5 TABLET ORAL at 17:46

## 2022-10-06 RX ADMIN — CHLORHEXIDINE GLUCONATE 15 ML: 1.2 SOLUTION ORAL at 08:25

## 2022-10-06 RX ADMIN — INSULIN LISPRO 1 UNITS: 100 INJECTION, SOLUTION INTRAVENOUS; SUBCUTANEOUS at 17:46

## 2022-10-06 RX ADMIN — AMIODARONE HYDROCHLORIDE 0.5 MG/MIN: 50 INJECTION, SOLUTION INTRAVENOUS at 00:00

## 2022-10-06 RX ADMIN — MUPIROCIN 1 APPLICATION: 20 OINTMENT TOPICAL at 08:25

## 2022-10-06 RX ADMIN — FUROSEMIDE 40 MG: 10 INJECTION, SOLUTION INTRAMUSCULAR; INTRAVENOUS at 08:25

## 2022-10-06 RX ADMIN — AMIODARONE HYDROCHLORIDE 0.5 MG/MIN: 50 INJECTION, SOLUTION INTRAVENOUS at 14:30

## 2022-10-06 NOTE — PHYSICAL THERAPY NOTE
PHYSICAL THERAPY NOTE          Patient Name: Denice Castle  BEQGW'L Date: 10/6/2022           10/06/22 1102   PT Last Visit   PT Visit Date 10/06/22   Note Type   Note Type Treatment   Pain Assessment   Pain Assessment Tool 0-10   Pain Score No Pain   Restrictions/Precautions   Other Precautions Cardiac/sternal;Multiple lines;Telemetry  (CT)   General   Chart Reviewed Yes   Additional Pertinent History cleared for Tx session (spoke to nsg)   Response to Previous Treatment Patient with no complaints from previous session  Cognition   Overall Cognitive Status WFL   Arousal/Participation Alert; Cooperative   Attention Within functional limits   Orientation Level Oriented to person;Oriented to place;Oriented to situation   Memory Decreased recall of precautions   Following Commands Follows one step commands without difficulty   Subjective   Subjective Alert; in the chair; agreeable to mobilize   Bed Mobility   Sit to Supine 3  Moderate assistance   Additional items Assist x 2; Increased time required;Verbal cues;LE management   Transfers   Sit to Stand 4  Minimal assistance   Additional items Assist x 1;Verbal cues  (2 trials)   Stand to Sit 4  Minimal assistance   Additional items Assist x 1;Verbal cues  (2 trials)   Additional Comments Dizziness and decreased BP upon initial standing; RN adjusted emerald and pt gradually improved; another standing trial and no increased dizziness reported  Ambulation/Elevation   Gait pattern Excessively slow; Short stride; Inconsistent leon   Gait Assistance 4  Minimal assist   Additional items Assist x 1;Verbal cues; Tactile cues   Assistive Device Rolling walker   Distance 120 ft   Balance   Static Sitting Fair +   Dynamic Sitting Fair   Static Standing Fair -   Dynamic Standing Poor +   Ambulatory Poor +   Activity Tolerance   Activity Tolerance Patient limited by fatigue;Treatment limited secondary to medical complications (Comment)  (elevated HR to 130s-140s bpm; RN aware)   Nurse Made Aware spoke to CARSON Erickson   Exercises   Hip Abduction Supine;AAROM; Bilateral;10 reps   Knee AROM Short Arc Quad Supine;AROM; Bilateral;10 reps   Ankle Pumps Supine;AROM; Bilateral;10 reps   Assessment   Prognosis Good   Problem List Decreased strength;Decreased endurance; Impaired balance;Decreased mobility   Assessment Pt is gradually improving in mobility skills ambulating further distances w/ rw; min (A) is still required for transfers and amb and mod (A) for bed mob to assure safety and to facilitate comfort; rest periods provided as needed t/o the session due to elevated HR to 130s-140s bpm noted (pt is on Amio drip); overall, cont to anticipate pt will return home w/ available family support upon D/C provided he cont improving w/ mobility skills, safety, and endurance and when medically cleared; home PT follow up is recommended; will follow   Barriers to Discharge Inaccessible home environment   Goals   Patient Goals to get better   STG Expiration Date 10/15/22   PT Treatment Day 1   Plan   Treatment/Interventions Functional transfer training;LE strengthening/ROM; Elevations; Therapeutic exercise; Endurance training;Bed mobility;Gait training;Spoke to nursing;Spoke to case management   Progress Progressing toward goals   PT Frequency 4-6x/wk   Recommendation   PT Discharge Recommendation Home with home health rehabilitation   Equipment Recommended [place] walker   AM-PAC Basic Mobility Inpatient   Turning in Bed Without Bedrails 3   Lying on Back to Sitting on Edge of Flat Bed 2   Moving Bed to Chair 3   Standing Up From Chair 3   Walk in Room 3   Climb 3-5 Stairs 2   Basic Mobility Inpatient Raw Score 16   Basic Mobility Standardized Score 38 32   Highest Level Of Mobility   -HLM Goal 5: Stand one or more mins   JH-HLM Achieved 7: Walk 25 feet or more   Education   Education Provided Mobility training;Assistive device   Patient Demonstrates verbal understanding   End of Consult   Patient Position at End of Consult Supine; All needs within reach     Cleveland Duran

## 2022-10-06 NOTE — CASE MANAGEMENT
Case Management Note    Patient name Fariha Monson  [facility] MRN [id number]  : 1950 Date 10/6/2022       Current Admission Date: 10/4/2022  Current Admission Diagnosis:Bicuspid aortic valve      LOS (days): 2  Geometric Mean LOS (GMLOS) (days):   Days to GMLOS:     OBJECTIVE:  Risk of Unplanned Readmission Score: 13 25   Current admission status: Inpatient   Primary Insurance: [address] [place] [address]:    Discharge planning discussed with[de-identified] Patient  Freedom of Choice: Yes  Were Treatment Team discharge recommendations reviewed with patient/caregiver?: Yes  Did patient/caregiver verbalize understanding of patient care needs?: Yes  Were patient/caregiver advised of the risks associated with not following Treatment Team discharge recommendations?: Yes    [facility]         Is the patient interested in [facility] at discharge?: Yes  [address] requested[de-identified] [address] Name[de-identified] [facility] Provider[de-identified] PCP  [address] Needed[de-identified] Post-Op Care and Assessment  Homebound Criteria Met[de-identified] Requires the Assistance of Another Person for Safe Ambulation or to Leave the Home  Supporting Clincal Findings[de-identified] Limited Endurance    Treatment Team Recommendation: Home with  [facility]    Additional Comments: Pt is S/P AVR and S/P CABG x 1  Pt is recommended to have in-home post-op skilled nursing care via a VNA for his aftercare plan  CM spoke to pt about this aftercare recommendation  Pt is agreeable w/ this recommendation  CM provided pt w/ freedom of choice for VNA referrals  Pt ws agreeable to referral to [facility]  CM made AIDIN referral to same  CM to follow

## 2022-10-06 NOTE — PLAN OF CARE
Problem: PHYSICAL THERAPY ADULT  Goal: Performs mobility at highest level of function for planned discharge setting  See evaluation for individualized goals  Description: Treatment/Interventions: Functional transfer training, LE strengthening/ROM, Elevations, Therapeutic exercise, Endurance training, Equipment eval/education, Bed mobility, Gait training, Spoke to nursing, Spoke to case management, OT  Equipment Recommended: Yaneth Crain (at this time)       See flowsheet documentation for full assessment, interventions and recommendations  Outcome: Progressing  Note: Prognosis: Good  Problem List: Decreased strength, Decreased endurance, Impaired balance, Decreased mobility  Assessment: Pt is gradually improving in mobility skills ambulating further distances w/ rw; min (A) is still required for transfers and amb and mod (A) for bed mob to assure safety and to facilitate comfort; rest periods provided as needed t/o the session due to elevated HR to 130s-140s bpm noted (pt is on Amio drip); overall, cont to anticipate pt will return home w/ available family support upon D/C provided he cont improving w/ mobility skills, safety, and endurance and when medically cleared; home PT follow up is recommended; will follow  Barriers to Discharge: Inaccessible home environment     PT Discharge Recommendation: Home with home health rehabilitation    See flowsheet documentation for full assessment

## 2022-10-06 NOTE — PROGRESS NOTES
Progress Note - Cardiothoracic Surgery   Ramona Lott 74 y o  male MRN: [id number]  Unit/Bed#: [facility] 415-01 Encounter: [id number]      POD # 2 s/p AVR, CABG    Pt seen/examined  Interval history and data reviewed with critical care team   Pt doing well  No specific complaints  In and out of AFib  David gtt 40        Medications:   Scheduled Meds:  Current Facility-Administered Medications   Medication Dose Route Frequency Provider Last Rate   • acetaminophen  650 mg Rectal Q4H PRN Leo Gonzalez PA-C     • acetaminophen  975 mg Oral Q8H Leo Gonzalez PA-C     • amiodarone  0 5 mg/min Intravenous Continuous Lila Duran PA-C 0 5 mg/min (10/06/22 0000)   • amiodarone  200 mg Oral [facility] Leo Gonzalez PA-C     • aspirin  81 mg Oral Daily Davina Cabrales PA-C     • atorvastatin  80 mg Oral Daily With JAMES Petit     • bisacodyl  10 mg Rectal Daily PRN Leo Gonzalez PA-C     • chlorhexidine  15 mL Swish & Spit BID Leo Gonzalez PA-C     • docusate sodium  100 mg Oral BID Lila Duran PA-C     • fondaparinux  2 5 mg Subcutaneous Daily Leo Gonzalez PA-C     • furosemide  40 mg Intravenous BID (diuretic) Davina Cabrales PA-C     • HYDROmorphone  0 5 mg Intravenous Q2H PRN Leo Gonzalez PA-C     • insulin lispro  1-5 Units Subcutaneous TID AC Lila Duran PA-C     • insulin lispro  1-5 Units Subcutaneous HS Lila Duran PA-C     • metoprolol tartrate  12 5 mg Oral Q12H [address] Davina Cabrales PA-C     • mupirocin  1 application Nasal N10M [address] Leo Gonzalez PA-C     • ondansetron  4 mg Intravenous Q6H PRN Leo Gonzalez PA-C     • oxyCODONE  2 5 mg Oral Q4H PRN Leo Gonzalez PA-C     • oxyCODONE  5 mg Oral Q4H PRN Leo Gonzalez PA-C     • pantoprazole  40 mg Oral Daily Leo Gonzalez PA-C     • phenylephine   mcg/min Intravenous Titrated Leo HEATHER Gonzalez-C 40 mcg/min (10/06/22 0555)   • polyethylene glycol  17 g Oral Daily Leo Gonzalez PA-C     • warfarin  5 mg Oral Once (warfarin) Darby Stacy PA-C       Continuous Infusions:amiodarone, 0 5 mg/min, Last Rate: 0 5 mg/min (10/06/22 0000)  phenylephine,  mcg/min, Last Rate: 40 mcg/min (10/06/22 0555)      PRN Meds: •  acetaminophen  •  bisacodyl  •  HYDROmorphone  •  ondansetron  •  oxyCODONE  •  oxyCODONE    Vitals: Blood pressure (!) 80/65, pulse (!) 120, temperature 98 4 °F (36 9 °C), temperature source Oral, resp  rate (!) 26, height 5' 11" (1 803 m), weight 71 4 kg (157 lb 6 5 oz), SpO2 98 %  ,Body mass index is 21 95 kg/m²  I/O last 24 hours: In: 3076 8 [P O :1740; I V :726 8; IV Piggyback:610]  Out: 2602 [Urine:1205;  Chest Tube:520]  Invasive Devices  Report    Central Venous Catheter Line  Duration           CVC Central Lines 10/04/22 Triple 2 days          Peripheral Intravenous Line  Duration           Peripheral IV 10/04/22 Right Hand 2 days          Arterial Line  Duration           Arterial Line 10/04/22 Left Radial 2 days          Line  Duration           Pacer Wires 1 day    Pacer Wires 1 day          Drain  Duration           Chest Tube 1 Left Pleural 32 Fr  1 day    Chest Tube 2 Posterior;Mediastinal 32 Fr  1 day    Chest Tube 3 Anterior;Mediastinal 32 Fr  1 day                  Lab, Imaging and other studies:   Results from last 7 days   Lab Units 10/06/22  0359 10/05/22  0411 10/04/22  1615   WBC Thousand/uL 13 57* 8 86 14 90*   HEMOGLOBIN g/dL 8 8* 8 5* 10 5*   HEMATOCRIT % 26 5* 25 9* 32 5*   PLATELETS Thousands/uL 121* 102* 134*     Results from last 7 days   Lab Units 10/06/22  0359 10/05/22  1925 10/05/22  0411 10/04/22  1805 10/04/22  1400   POTASSIUM mmol/L 3 8 4 0 4 0   < >  --    CHLORIDE mmol/L 102 102 109*  --   --    CO2 mmol/L 24 23 24  --   --    CO2, I-STAT mmol/L  --   --   --   --  25   BUN mg/dL 18 18 12  --   --    CREATININE mg/dL 0 80 0 96 0 64  --   --    GLUCOSE, ISTAT mg/dl  --   --   --   --  146*   CALCIUM mg/dL 7 9* 8 1* 7 5*  --   --     < > = values in this interval not displayed  Results from last 7 days   Lab Units 10/04/22  1615 10/04/22  1357   INR  1 23* 1 47*   PTT seconds  --  54*     Recent Labs     10/05/22  0411   PHART 7 405   ZRF2MWC 25 1   PO2ART 108 4   JCB3ERP 40 9   BEART 0 3           Plan:    Wean David as able  Amiodarone gtt for AFib  Cont  [place]  Continue chest tubes, DC pacing wires  Ambulate  Incentive spirometry  Increase diuresis  PO ASA/Statin/B blocker        Thony Medina  DATE: October 6, 2022  TIME: 8:27 AM

## 2022-10-06 NOTE — PROGRESS NOTES
Progress Note - Critical Care   Veneda Lab 74 y o  male MRN: [id number]  Unit/Bed#: [facility] 415-01 Encounter: [id number]      Attending Physician: Serafin Pino MD    24 Hour Events/HPI: POD # 2 s/p CABG x 1, AVR  Rapid AFib yesterday, received 2 boluses of amio and started on gtt  David requirements as high as 150 during the day, now weaned to 40  Received additional lasix 40 overnight  ROS: Review of Systems  ---------------------------------------------------------------------------------------------------------------------------------------------------------------------  Impressions:  1  Severe AS s/p tissue AVR  2  Bicuspid AV  3  CAD s/p CABG x 1  4  HTN  5  HLD  6  Pre-diabetes  7  AFib    Plan:    Neuro:   · Pain controlled with: Scheduled tylenol, prn oxycodone  · Regulate sleep/wake cycle  · Delirium precautions  · CAM-ICU daily  · Trend neuro exam    CV:   · Cardiac infusions: Neosynephrine, 40 mcg/min  · Wean as able  · MAP goal > 65   · Keep arterial line  · Rhythm: Atrial Fibrillation  · Follow rhythm on telemetry  · Hold lopressor while requiring david  · Epicardial pacing wires no longer required  Remove today  ·  mg QD  · Statin: Lipitor 80mg qHS  · Amiodarone 200 mg PO q8 hours     Lung:   · Acute post-op pulmonary insufficiency; Requiring 2 Liters via nasal cannula, secondary to poor inspiratory effort secondary to pain  Continue incentive spirometry/coughing/deep breathing exercises  Wean supplemental oxygen as tolerated for saturation > 90%  · Wean as tolerated  · Chest tube output remains persistently high; Continue chest tubes to suction today    GI:   · Continue PPI for stress ulcer prophylaxis  · Continue bowel regimen  · Trend abdominal exam and bowel function    FEN:   · Diuretic plan:  Increase Lasix to 40 mg IV q BID  · K-dur 20 mEQ PO q BID  · Nutrition/diet plan: Cardiac diet  · Replenish electrolytes with goals: K >4 0, Mag >2 0, and Phos >3 0    :   · Indwelling Cosme present: no   · Trend UOP and BUN/creat  · Strict I and O    ID:   · Trend temps and WBC count  · Maintain normothermia        Heme:   · Trend hgb and plts    Endo:   · Glycemic control plan: SSI    Results from last 6 Months   Lab Units 09/23/22  0851 04/12/22  0835   HEMOGLOBIN A1C % 5 7* 5 9*     MSK/Skin:  · Mobility goal: Ambulation  · PT consult: yes  · OT consult: yes  · Frequent turning and pressure off-loading  · Local wound care as needed    Disposition:  Transfer to SD level 1  ---------------------------------------------------------------------------------------------------------------------------------------------------------------------  Allergies:    Allergies   Allergen Reactions   • Pollen Extract Nasal Congestion       Medications:     VTE Pharmacologic Prophylaxis: Fondaparinux (Arixtra)  VTE Mechanical Prophylaxis: sequential compression device    Scheduled Meds:   acetaminophen, 975 mg, Q8H  amiodarone, 200 mg, Q8H LUIS ENRIQUE  aspirin, 325 mg, Daily  atorvastatin, 80 mg, Daily With Dinner  chlorhexidine, 15 mL, BID  docusate sodium, 100 mg, BID  fondaparinux, 2 5 mg, Daily  furosemide, 40 mg, Daily  insulin lispro, 1-5 Units, TID AC  insulin lispro, 1-5 Units, HS  mupirocin, 1 application, D60C LUIS ENRIQUE  pantoprazole, 40 mg, Daily  polyethylene glycol, 17 g, Daily       Continuous Infusions:  amiodarone, 0 5 mg/min, Last Rate: 0 5 mg/min (10/06/22 0000)  phenylephine,  mcg/min, Last Rate: 40 mcg/min (10/06/22 0555)      PRN Meds:  acetaminophen, 650 mg, Q4H PRN  bisacodyl, 10 mg, Daily PRN  HYDROmorphone, 0 5 mg, Q2H PRN  ondansetron, 4 mg, Q6H PRN  oxyCODONE, 2 5 mg, Q4H PRN  oxyCODONE, 5 mg, Q4H PRN      ---------------------------------------------------------------------------------------------------------------------------------------------------------------------  Vitals:   Vitals:    10/06/22 0200 10/06/22 0300 10/06/22 0400 10/06/22 0600   BP: 95/72 106/75 90/62 (!) 80/65   BP Location: Right arm    Pulse: (!) 114 (!) 114 (!) 122 (!) 120   Resp: (!) 26 (!) 26 (!) 28 (!) 26   Temp:   98 4 °F (36 9 °C)    TempSrc:   Oral    SpO2: 98% 97% 96% 98%   Weight:    71 4 kg (157 lb 6 5 oz)   Height:         Arterial Line:  106/67(79)    Tele Rhythm: Atrial Fibrillation (This was personally reviewed by myself )    Respiratory:  SpO2: SpO2: 98 %  SpO2 Device: O2 Device: Nasal cannula  Nasal Cannula O2 Flow Rate (L/min): 2 L/min    Ventilator:  Respiratory  Report   Lab Data (Last 4 hours)    None         O2/Vent Data (Last 4 hours)    None              Temperature: Temp (24hrs), Av 5 °F (36 9 °C), Min:98 3 °F (36 8 °C), Max:98 6 °F (37 °C)  Current: Temperature: 98 4 °F (36 9 °C)    Weights:   Weight (last 2 days)     Date/Time Weight    10/06/22 0600 71 4 (157 41)    10/05/22 0549 74 9 (165 12)    10/04/22 0617 83 (183)        Body mass index is 21 95 kg/m²  Intake and Outputs:    Intake/Output Summary (Last 24 hours) at 10/6/2022 0705  Last data filed at 10/6/2022 0600  Gross per 24 hour   Intake 3076 84 ml   Output 1725 ml   Net 1351 84 ml     I/O last 24 hours: In: 3076 8 [P O :1740; I V :726 8; IV Piggyback:610]  Out: 4338 [Urine:1205;  Chest Tube:520]    UOP: 50cc/hour     Chest tube Output:  Mediastinal tubes: 60 mL/8 hours  210 mL/24 hours   Pleural tubes: 120 mL/8 hours  310 mL/24 hours     Labs:  Results from last 7 days   Lab Units 10/06/22  0359 10/05/22  0411 10/04/22  1615   WBC Thousand/uL 13 57* 8 86 14 90*   HEMOGLOBIN g/dL 8 8* 8 5* 10 5*   HEMATOCRIT % 26 5* 25 9* 32 5*   PLATELETS Thousands/uL 121* 102* 134*     Results from last 7 days   Lab Units 10/06/22  0359 10/05/22  1925 10/05/22  0411 10/04/22  1805 10/04/22  1400 10/04/22  1357 10/04/22  1246 10/04/22  1159   SODIUM mmol/L 132* 132* 138  --   --    < >  --   --    POTASSIUM mmol/L 3 8 4 0 4 0   < >  --    < >  --   --    CHLORIDE mmol/L 102 102 109*  --   --    < >  --   --    CO2 mmol/L 24 23 24  --   --    < >  --   -- CO2, I-STAT mmol/L  --   --   --   --  25  --  24 30   BUN mg/dL 18 18 12  --   --    < >  --   --    CREATININE mg/dL 0 80 0 96 0 64  --   --    < >  --   --    CALCIUM mg/dL 7 9* 8 1* 7 5*  --   --    < >  --   --    GLUCOSE, ISTAT mg/dl  --   --   --   --  146*  --  187* 200*    < > = values in this interval not displayed  Baseline Creat: 0 8    Results from last 7 days   Lab Units 10/05/22  1925 10/05/22  0411   MAGNESIUM mg/dL 2 3 2 3          Results from last 7 days   Lab Units 10/04/22  1615 10/04/22  1357   INR  1 23* 1 47*   PTT seconds  --  54*         Results from last 7 days   Lab Units 10/05/22  0411   PH ART  7 405   PCO2 ART mm Hg 40 9   PO2 ART mm Hg 108 4   HCO3 ART mmol/L 25 1   BASE EXC ART mmol/L 0 3   ABG SOURCE  Line, Arterial       Diagnositic Studies:  10/06/22 CXR: No new imaging  This was personally reviewed by myself in PACS  Nutrition:        Diet Orders   (From admission, onward)             Start     Ordered    10/05/22 0000  Diet Cardiovascular; Cardiac; Fluid Restriction 1800 ML  Diet effective 0500        References:    Nutrtion Support Algorithm Enteral vs  Parenteral   Question Answer Comment   Diet Type Cardiovascular    Cardiac Cardiac    Other Restriction(s): Fluid Restriction 1800 ML    RD to adjust diet per protocol? Yes        10/04/22 1355              Physical Exam  Vitals and nursing note reviewed  Constitutional:       General: He is awake  He is not in acute distress  Appearance: He is normal weight  Interventions: Nasal cannula in place  HENT:      Head: Normocephalic and atraumatic  Eyes:      Pupils: Pupils are equal, round, and reactive to light  Cardiovascular:      Rate and Rhythm: Tachycardia present  Rhythm irregularly irregular  Heart sounds: Normal heart sounds  Heart sounds not distant  No murmur heard  No friction rub  No gallop        Comments: Trace edema  Pulmonary:      Effort: Pulmonary effort is normal       Breath sounds: Normal breath sounds  No decreased breath sounds, wheezing, rhonchi or rales  Abdominal:      Palpations: Abdomen is soft  Tenderness: There is no abdominal tenderness  Musculoskeletal:      Right lower leg: Edema present  Left lower leg: Edema present  Skin:     General: Skin is warm and dry  Neurological:      General: No focal deficit present  Mental Status: He is alert and oriented to person, place, and time  Psychiatric:         Behavior: Behavior is cooperative  Invasive lines and devices: Invasive Devices  Report    Central Venous Catheter Line  Duration           CVC Central Lines 10/04/22 Triple 1 day          Peripheral Intravenous Line  Duration           Peripheral IV 10/04/22 Right Hand 1 day          Arterial Line  Duration           Arterial Line 10/04/22 Left Radial 1 day          Line  Duration           Pacer Wires 1 day    Pacer Wires 1 day          Drain  Duration           Chest Tube 1 Left Pleural 32 Fr  1 day    Chest Tube 2 Posterior;Mediastinal 32 Fr  1 day    Chest Tube 3 Anterior;Mediastinal 32 Fr  1 day              ---------------------------------------------------------------------------------------------------------------------------------------------------------------------  Code Status: Level 1 - Full Code    Care Time Delivered:   No Critical Care time spent     Collaborative bedside rounds performed with cardiac surgery attending, critical care attending and bedside RN      SIGNATURE: Jhoan Abbott  DATE: October 6, 2022  TIME: 7:05 AM

## 2022-10-06 NOTE — PLAN OF CARE
Problem: Prexisting or High Potential for Compromised Skin Integrity  Goal: Skin integrity is maintained or improved  Description: INTERVENTIONS:  - Identify patients at risk for skin breakdown  - Assess and monitor skin integrity  - Assess and monitor nutrition and hydration status  - Monitor labs   - Assess for incontinence   - Turn and reposition patient  - Assist with mobility/ambulation  - Relieve pressure over bony prominences  - Avoid friction and shearing  - Provide appropriate hygiene as needed including keeping skin clean and dry  - Evaluate need for skin moisturizer/barrier cream  - Collaborate with interdisciplinary team   - Patient/family teaching  - Consider wound care consult   Outcome: Progressing     Problem: MOBILITY - ADULT  Goal: Maintain or return to baseline ADL function  Description: INTERVENTIONS:  -  Assess patient's ability to carry out ADLs; assess patient's baseline for ADL function and identify physical deficits which impact ability to perform ADLs (bathing, care of mouth/teeth, toileting, grooming, dressing, etc )  - Assess/evaluate cause of self-care deficits   - Assess range of motion  - Assess patient's mobility; develop plan if impaired  - Assess patient's need for assistive devices and provide as appropriate  - Encourage maximum independence but intervene and supervise when necessary  - Involve family in performance of ADLs  - Assess for home care needs following discharge   - Consider OT consult to assist with ADL evaluation and planning for discharge  - Provide patient education as appropriate  Outcome: Progressing  Goal: Maintains/Returns to pre admission functional level  Description: INTERVENTIONS:  - Perform BMAT or MOVE assessment daily    - Set and communicate daily mobility goal to care team and patient/family/caregiver  - Collaborate with rehabilitation services on mobility goals if consulted  - Perform Range of Motion 2 times a day    - Reposition patient every 2 hours   - Dangle patient 2 times a day  - Stand patient 2 times a day  - Ambulate patient 2 times a day  - Out of bed to chair 2 times a day   - Out of bed for meals 2 times a day  - Out of bed for toileting  - Record patient progress and toleration of activity level   Outcome: Progressing

## 2022-10-06 NOTE — CASE MANAGEMENT
Case Management Assessment    Patient name Lazara Record  Location [facility] 644-12 MRN [id number]  : 1950 Date 10/6/2022       Current Admission Date: 10/4/2022  Current Admission Diagnosis:Bicuspid aortic valve   Patient Active Problem List    Diagnosis Date Noted   • S/P CABG x 1 10/04/2022   • Coronary artery disease 10/04/2022   • S/P AVR (aortic valve replacement) 10/04/2022   • Nonrheumatic aortic valve stenosis    • Ascending aortic aneurysm    • Nonrheumatic aortic valve insufficiency    • Pre-diabetes    • Hyperlipidemia    • Bicuspid aortic valve       LOS (days): 2  Geometric Mean LOS (GMLOS) (days):   Days to GMLOS:     OBJECTIVE:  Risk of Unplanned Readmission Score: 13 25   Current admission status: Inpatient    Preferred Pharmacy:   [facility] [address]  [address] [place] [id number]  Phone: [phone] Fax: [phone]    Primary Care Provider: Braxton Graham MD    Primary Insurance: [address]  Secondary Insurance:     ASSESSMENT:  [address] Proxies    There are no active Health Care Proxies on file  Advance Directives  Does patient have a [address]?: No  Does patient have Advance Directives?: No  Primary Contact: Pt's wife Jesusita Rojas / phone: [phone]    Patient Information  Admitted from[de-identified] Home  Mental Status: Alert  Assessment information provided by[de-identified] Patient  Primary Caregiver: Self  Support Systems: Spouse/significant other  [place] of Residence: [address] [place] [address] do you live in?: [place]c, [address] entry access options   Select all that apply : Stairs  Number of steps to enter home : 1  Type of Current Residence: 2 story home  Upon entering residence, is there a bedroom on the main floor (no further steps)?: No  A bedroom is located on the following floor levels of residence (select all that apply):: 2nd Floor  Upon entering residence, is there a bathroom on the main floor (no further steps)?: Yes  Number of steps to 2nd floor from main floor: One Flight  In the last 12 months, was there a time when you were not able to pay the mortgage or rent on time?: No  In the last 12 months, how many places have you lived?: 1  In the last 12 months, was there a time when you did not have a steady place to sleep or slept in a shelter (including now)?: No  Homeless/housing insecurity resource given?: N/A  Living Arrangements: Lives w/ Spouse/significant other  Is patient a ?: No    Activities of Daily Living Prior to Admission  Functional Status: Independent  Completes ADLs independently?: Yes  Ambulates independently?: Yes  Does patient use assisted devices?: No  Does patient currently own DME?: No  Does patient have a history of Outpatient Therapy (PT/OT)?: No  Does the patient have a history of Short-Term Rehab?: No  Does patient have a history of HHC?: No  Does patient currently have "[employer]?: No    Patient Information Continued  Income Source: Pension/[facility]  Does patient have prescription coverage?: Yes  Within the past 12 months, you worried that your food would run out before you got the money to buy more : Never true  Within the past 12 months, the food you bought just didn't last and you didn't have money to get more : Never true  Food insecurity resource given?: N/A  Does patient receive dialysis treatments?: No  Does patient have a history of substance abuse?: No  Does patient have a history of Mental Health Diagnosis?: No    Means of Transportation  Means of Transport to Appts[de-identified] Drives Self  In the past 12 months, has lack of transportation kept you from medical appointments or from getting medications?: No  In the past 12 months, has lack of transportation kept you from meetings, work, or from getting things needed for daily living?: No  Was application for public transport provided?: N/A     Additional Comments: CM reviewed d/c planning process including the following: identifying help at home, patient preference for d/c planning needs, Discharge Lounge, [facility] Meds to Bed program, availability of treatment team to discuss questions or concerns patient and/or family may have regarding understanding medications and recognizing signs and symptoms once discharged  CM also encouraged patient to follow up with all recommended appointments after discharge  Patient advised of importance for patient and family to participate in managing patient’s medical well being  Patient/caregiver received discharge checklist  Content reviewed  Patient/caregiver encouraged to participate in discharge plan of care prior to discharge home

## 2022-10-06 NOTE — PROCEDURES
Procedure: Epicardial Wire Removal    10/06/22    Patient was returned to bed  EPW x 2 d/c'd in typical fashion  No immediate complications  Site dressed with dry sterile dressing  Patient and nurse aware of mandatory 1 hour bedrest protocol  Vital signs ordered Q 15 minutes for one hour as per protocol      Clarice Esteban

## 2022-10-07 PROBLEM — D62 POSTOPERATIVE ANEMIA DUE TO ACUTE BLOOD LOSS: Status: ACTIVE | Noted: 2022-10-07

## 2022-10-07 PROBLEM — I48.91 POSTOPERATIVE ATRIAL FIBRILLATION (HCC): Status: ACTIVE | Noted: 2022-10-07

## 2022-10-07 PROBLEM — D69.6 THROMBOCYTOPENIA (HCC): Status: ACTIVE | Noted: 2022-10-07

## 2022-10-07 PROBLEM — Z79.01 ANTICOAGULATED ON COUMADIN: Status: ACTIVE | Noted: 2022-10-07

## 2022-10-07 PROBLEM — E87.1 HYPONATREMIA: Status: ACTIVE | Noted: 2022-10-07

## 2022-10-07 PROBLEM — I97.89 POSTOPERATIVE ATRIAL FIBRILLATION (HCC): Status: ACTIVE | Noted: 2022-10-07

## 2022-10-07 PROBLEM — D72.829 LEUKOCYTOSIS: Status: ACTIVE | Noted: 2022-10-07

## 2022-10-07 LAB
ANION GAP SERPL CALCULATED.3IONS-SCNC: 5 MMOL/L (ref 4–13)
BASOPHILS # BLD AUTO: 0.01 THOUSANDS/ÂΜL (ref 0–0.1)
BASOPHILS NFR BLD AUTO: 0 % (ref 0–1)
BUN SERPL-MCNC: 20 MG/DL (ref 5–25)
CALCIUM SERPL-MCNC: 8 MG/DL (ref 8.3–10.1)
CHLORIDE SERPL-SCNC: 99 MMOL/L (ref 96–108)
CO2 SERPL-SCNC: 26 MMOL/L (ref 21–32)
CREAT SERPL-MCNC: 0.74 MG/DL (ref 0.6–1.3)
EOSINOPHIL # BLD AUTO: 0.01 THOUSAND/ÂΜL (ref 0–0.61)
EOSINOPHIL NFR BLD AUTO: 0 % (ref 0–6)
ERYTHROCYTE [DISTWIDTH] IN BLOOD BY AUTOMATED COUNT: 12.8 % (ref 11.6–15.1)
GFR SERPL CREATININE-BSD FRML MDRD: 90 ML/MIN/1.73SQ M
GLUCOSE SERPL-MCNC: 139 MG/DL (ref 65–140)
GLUCOSE SERPL-MCNC: 148 MG/DL (ref 65–140)
GLUCOSE SERPL-MCNC: 149 MG/DL (ref 65–140)
GLUCOSE SERPL-MCNC: 153 MG/DL (ref 65–140)
GLUCOSE SERPL-MCNC: 164 MG/DL (ref 65–140)
HCT VFR BLD AUTO: 23.6 % (ref 36.5–49.3)
HGB BLD-MCNC: 7.8 G/DL (ref 12–17)
IMM GRANULOCYTES # BLD AUTO: 0.06 THOUSAND/UL (ref 0–0.2)
IMM GRANULOCYTES NFR BLD AUTO: 1 % (ref 0–2)
LYMPHOCYTES # BLD AUTO: 1.47 THOUSANDS/ÂΜL (ref 0.6–4.47)
LYMPHOCYTES NFR BLD AUTO: 12 % (ref 14–44)
MAGNESIUM SERPL-MCNC: 2.1 MG/DL (ref 1.6–2.6)
MCH RBC QN AUTO: 32.4 PG (ref 26.8–34.3)
MCHC RBC AUTO-ENTMCNC: 33.1 G/DL (ref 31.4–37.4)
MCV RBC AUTO: 98 FL (ref 82–98)
MONOCYTES # BLD AUTO: 1 THOUSAND/ÂΜL (ref 0.17–1.22)
MONOCYTES NFR BLD AUTO: 8 % (ref 4–12)
NEUTROPHILS # BLD AUTO: 9.97 THOUSANDS/ÂΜL (ref 1.85–7.62)
NEUTS SEG NFR BLD AUTO: 79 % (ref 43–75)
NRBC BLD AUTO-RTO: 0 /100 WBCS
PLATELET # BLD AUTO: 117 THOUSANDS/UL (ref 149–390)
PMV BLD AUTO: 11.7 FL (ref 8.9–12.7)
POTASSIUM SERPL-SCNC: 3.5 MMOL/L (ref 3.5–5.3)
RBC # BLD AUTO: 2.41 MILLION/UL (ref 3.88–5.62)
SODIUM SERPL-SCNC: 130 MMOL/L (ref 135–147)
WBC # BLD AUTO: 12.52 THOUSAND/UL (ref 4.31–10.16)

## 2022-10-07 PROCEDURE — 97116 GAIT TRAINING THERAPY: CPT

## 2022-10-07 PROCEDURE — 80048 BASIC METABOLIC PNL TOTAL CA: CPT | Performed by: PHYSICIAN ASSISTANT

## 2022-10-07 PROCEDURE — 99024 POSTOP FOLLOW-UP VISIT: CPT | Performed by: PHYSICIAN ASSISTANT

## 2022-10-07 PROCEDURE — 83735 ASSAY OF MAGNESIUM: CPT | Performed by: PHYSICIAN ASSISTANT

## 2022-10-07 PROCEDURE — 97530 THERAPEUTIC ACTIVITIES: CPT

## 2022-10-07 PROCEDURE — 99024 POSTOP FOLLOW-UP VISIT: CPT | Performed by: THORACIC SURGERY (CARDIOTHORACIC VASCULAR SURGERY)

## 2022-10-07 PROCEDURE — 82948 REAGENT STRIP/BLOOD GLUCOSE: CPT

## 2022-10-07 PROCEDURE — 85025 COMPLETE CBC W/AUTO DIFF WBC: CPT | Performed by: PHYSICIAN ASSISTANT

## 2022-10-07 RX ORDER — AMOXICILLIN 250 MG
1 CAPSULE ORAL 2 TIMES DAILY
Status: DISCONTINUED | OUTPATIENT
Start: 2022-10-07 | End: 2022-10-10 | Stop reason: HOSPADM

## 2022-10-07 RX ORDER — POTASSIUM CHLORIDE 20 MEQ/1
20 TABLET, EXTENDED RELEASE ORAL
Status: DISCONTINUED | OUTPATIENT
Start: 2022-10-07 | End: 2022-10-09

## 2022-10-07 RX ORDER — FUROSEMIDE 10 MG/ML
40 INJECTION INTRAMUSCULAR; INTRAVENOUS
Status: DISCONTINUED | OUTPATIENT
Start: 2022-10-07 | End: 2022-10-09

## 2022-10-07 RX ORDER — FERROUS SULFATE 325(65) MG
325 TABLET ORAL
Status: DISCONTINUED | OUTPATIENT
Start: 2022-10-07 | End: 2022-10-10 | Stop reason: HOSPADM

## 2022-10-07 RX ORDER — WARFARIN SODIUM 5 MG/1
5 TABLET ORAL
Status: DISCONTINUED | OUTPATIENT
Start: 2022-10-07 | End: 2022-10-07

## 2022-10-07 RX ORDER — ASCORBIC ACID 500 MG
500 TABLET ORAL DAILY
Status: DISCONTINUED | OUTPATIENT
Start: 2022-10-07 | End: 2022-10-10 | Stop reason: HOSPADM

## 2022-10-07 RX ADMIN — FONDAPARINUX SODIUM 2.5 MG: 2.5 INJECTION, SOLUTION SUBCUTANEOUS at 09:21

## 2022-10-07 RX ADMIN — CHLORHEXIDINE GLUCONATE 15 ML: 1.2 SOLUTION ORAL at 17:26

## 2022-10-07 RX ADMIN — ASPIRIN 81 MG CHEWABLE TABLET 81 MG: 81 TABLET CHEWABLE at 09:21

## 2022-10-07 RX ADMIN — METOPROLOL TARTRATE 25 MG: 25 TABLET, FILM COATED ORAL at 22:26

## 2022-10-07 RX ADMIN — AMIODARONE HYDROCHLORIDE 200 MG: 200 TABLET ORAL at 22:26

## 2022-10-07 RX ADMIN — POTASSIUM CHLORIDE 20 MEQ: 1500 TABLET, EXTENDED RELEASE ORAL at 17:26

## 2022-10-07 RX ADMIN — POTASSIUM CHLORIDE 20 MEQ: 1500 TABLET, EXTENDED RELEASE ORAL at 11:57

## 2022-10-07 RX ADMIN — ACETAMINOPHEN 975 MG: 325 TABLET, FILM COATED ORAL at 13:53

## 2022-10-07 RX ADMIN — SENNOSIDES AND DOCUSATE SODIUM 1 TABLET: 8.6; 5 TABLET ORAL at 11:57

## 2022-10-07 RX ADMIN — ACETAMINOPHEN 975 MG: 325 TABLET, FILM COATED ORAL at 05:15

## 2022-10-07 RX ADMIN — FUROSEMIDE 40 MG: 10 INJECTION, SOLUTION INTRAMUSCULAR; INTRAVENOUS at 09:21

## 2022-10-07 RX ADMIN — OXYCODONE HYDROCHLORIDE 5 MG: 5 TABLET ORAL at 02:49

## 2022-10-07 RX ADMIN — FERROUS SULFATE TAB 325 MG (65 MG ELEMENTAL FE) 325 MG: 325 (65 FE) TAB at 09:21

## 2022-10-07 RX ADMIN — INSULIN LISPRO 1 UNITS: 100 INJECTION, SOLUTION INTRAVENOUS; SUBCUTANEOUS at 11:57

## 2022-10-07 RX ADMIN — AMIODARONE HYDROCHLORIDE 200 MG: 200 TABLET ORAL at 05:15

## 2022-10-07 RX ADMIN — ATORVASTATIN CALCIUM 80 MG: 80 TABLET, FILM COATED ORAL at 17:26

## 2022-10-07 RX ADMIN — OXYCODONE HYDROCHLORIDE AND ACETAMINOPHEN 500 MG: 500 TABLET ORAL at 09:21

## 2022-10-07 RX ADMIN — AMIODARONE HYDROCHLORIDE 200 MG: 200 TABLET ORAL at 13:53

## 2022-10-07 RX ADMIN — SENNOSIDES AND DOCUSATE SODIUM 1 TABLET: 8.6; 5 TABLET ORAL at 17:26

## 2022-10-07 RX ADMIN — INSULIN LISPRO 1 UNITS: 100 INJECTION, SOLUTION INTRAVENOUS; SUBCUTANEOUS at 22:27

## 2022-10-07 RX ADMIN — FUROSEMIDE 40 MG: 10 INJECTION, SOLUTION INTRAMUSCULAR; INTRAVENOUS at 11:57

## 2022-10-07 RX ADMIN — OXYCODONE HYDROCHLORIDE 2.5 MG: 5 TABLET ORAL at 11:57

## 2022-10-07 RX ADMIN — MUPIROCIN 1 APPLICATION: 20 OINTMENT TOPICAL at 22:27

## 2022-10-07 RX ADMIN — POLYETHYLENE GLYCOL 3350 17 G: 17 POWDER, FOR SOLUTION ORAL at 09:33

## 2022-10-07 RX ADMIN — CHLORHEXIDINE GLUCONATE 15 ML: 1.2 SOLUTION ORAL at 09:21

## 2022-10-07 RX ADMIN — POTASSIUM CHLORIDE 20 MEQ: 1500 TABLET, EXTENDED RELEASE ORAL at 09:21

## 2022-10-07 RX ADMIN — ACETAMINOPHEN 975 MG: 325 TABLET, FILM COATED ORAL at 22:26

## 2022-10-07 RX ADMIN — OXYCODONE HYDROCHLORIDE 5 MG: 5 TABLET ORAL at 22:35

## 2022-10-07 RX ADMIN — DOCUSATE SODIUM 100 MG: 100 CAPSULE, LIQUID FILLED ORAL at 09:21

## 2022-10-07 RX ADMIN — MUPIROCIN 1 APPLICATION: 20 OINTMENT TOPICAL at 09:21

## 2022-10-07 RX ADMIN — METOPROLOL TARTRATE 25 MG: 25 TABLET, FILM COATED ORAL at 09:21

## 2022-10-07 RX ADMIN — FUROSEMIDE 40 MG: 10 INJECTION, SOLUTION INTRAMUSCULAR; INTRAVENOUS at 17:26

## 2022-10-07 RX ADMIN — PANTOPRAZOLE SODIUM 40 MG: 40 TABLET, DELAYED RELEASE ORAL at 05:15

## 2022-10-07 NOTE — PROGRESS NOTES
Progress Note - Cardiothoracic Surgery   Mady Amos 74 y o  male MRN: [id number]  Unit/Bed#: [facility] 428-01 Encounter: [id number]    Aortic regurgitation, Aortic stenosis, Non-Rheumatic, Coronary artery disease, BAV  S/P aortic valve replacement and coronary artery bypass grafting x 1; POD # 3      24 Hour Events: Transferred to Stepdown yesterday  Converted to NSR yesterday at approx 1530  Remains on Amio infusion 0 5mg/min  David off since 1730 last evening  Feels well today  Reports discomfort related to chest tubes  Denies CP or SOB      Medications:   Scheduled Meds:  Current Facility-Administered Medications   Medication Dose Route Frequency Provider Last Rate   • acetaminophen  650 mg Rectal Q4H PRN Shana Khanna PA-C     • acetaminophen  975 mg Oral Q8H Shana Khanna PA-C     • amiodarone  0 5 mg/min Intravenous Continuous [facility] 0 5 mg/min (10/06/22 1430)   • amiodarone  200 mg Oral [facility] Shana Khanna PA-C     • aspirin  81 mg Oral Daily [facility], PA-C     • atorvastatin  80 mg Oral Daily With JAMES Petit     • bisacodyl  10 mg Rectal Daily PRN Shana Khanna PA-C     • chlorhexidine  15 mL Swish & Spit BID Shana Khanna PA-C     • docusate sodium  100 mg Oral BID [facility]     • fondaparinux  2 5 mg Subcutaneous Daily Shana Khanna PA-C     • furosemide  40 mg Intravenous TID (diuretic) [facility]     • insulin lispro  1-5 Units Subcutaneous TID AC [facility]     • insulin lispro  1-5 Units Subcutaneous HS [facility]     • metoprolol tartrate  25 mg Oral Q12H [address] [place], [place]     • mupirocin  1 application Nasal D79N [address] Shana Khanna PA-C     • ondansetron  4 mg Intravenous Q6H PRN Shana Khanna PA-C     • oxyCODONE  2 5 mg Oral Q4H PRN Shana Khanna PA-C     • oxyCODONE  5 mg Oral Q4H PRN Shana Khanna PA-C     • pantoprazole  40 mg Oral Daily Shana Khanna PA-C     • polyethylene glycol 17 g Oral Daily Fredericka Apgar, PA-C     • potassium chloride  20 mEq Oral TID With Meals Jerel Can PA-C     • warfarin  5 mg Oral Once (warfarin) Jerel Can PA-C       Continuous Infusions:amiodarone, 0 5 mg/min, Last Rate: 0 5 mg/min (10/06/22 1430)      PRN Meds: •  acetaminophen  •  bisacodyl  •  ondansetron  •  oxyCODONE  •  oxyCODONE    Vitals:   Vitals:    10/06/22 2106 10/06/22 2305 10/07/22 0301 10/07/22 0600   BP: 108/74 107/72 123/83    BP Location:  Right arm Right arm    Pulse: 96 89 86    Resp:  16 17    Temp:  98 5 °F (36 9 °C) 98 2 °F (36 8 °C)    TempSrc:  Oral Oral    SpO2:  94% 96%    Weight:    85 4 kg (188 lb 4 4 oz)   Height:           Telemetry: NSR; Heart Rate: 80    Respiratory:   SpO2: SpO2: 96 %;  Room Air    Intake/Output:     Intake/Output Summary (Last 24 hours) at 10/7/2022 0810  Last data filed at 10/7/2022 0400  Gross per 24 hour   Intake 616 84 ml   Output 1390 ml   Net -773 16 ml        Chest tube Output:    Mediastinal tubes: 0 mL/8 hours  30 mL/24 hours   Pleural tubes: 40 mL/8 hours  210 mL/24 hours     Weights:   Weight (last 2 days)     Date/Time Weight    10/07/22 0600 85 4 (188 27)    10/06/22 0600 71 4 (157 41)    10/05/22 0549 74 9 (165 12)            Results:   Results from last 7 days   Lab Units 10/07/22  0429 10/06/22  0359 10/05/22  0411   WBC Thousand/uL 12 52* 13 57* 8 86   HEMOGLOBIN g/dL 7 8* 8 8* 8 5*   HEMATOCRIT % 23 6* 26 5* 25 9*   PLATELETS Thousands/uL 117* 121* 102*     Results from last 7 days   Lab Units 10/07/22  0429 10/06/22  0359 10/05/22  1925 10/04/22  1805 10/04/22  1400   SODIUM mmol/L 130* 132* 132*   < >  --    POTASSIUM mmol/L 3 5 3 8 4 0   < >  --    CHLORIDE mmol/L 99 102 102   < >  --    CO2 mmol/L 26 24 23   < >  --    CO2, I-STAT mmol/L  --   --   --   --  25   BUN mg/dL 20 18 18   < >  --    CREATININE mg/dL 0 74 0 80 0 96   < >  --    GLUCOSE, ISTAT mg/dl  --   --   --   --  146*   CALCIUM mg/dL 8 0* 7 9* 8 1*   < >  --     < > = values in this interval not displayed  Results from last 7 days   Lab Units 10/06/22  0840 10/04/22  1615 10/04/22  1357   INR  1 37* 1 23* 1 47*   PTT seconds  --   --  54*     Coumadin mg - 5 -  -        Point of care glucose:  - 192    3 units SSIC/24 hrs    Studies:  No studies past 24 hrs        Invasive Lines/Tubes:  Invasive Devices  Report    Central Venous Catheter Line  Duration           CVC Central Lines 10/04/22 Triple 2 days          Peripheral Intravenous Line  Duration           Peripheral IV 10/04/22 Right Hand 3 days          Arterial Line  Duration           Arterial Line 10/04/22 Left Radial 3 days          Drain  Duration           Chest Tube 1 Left Pleural 32 Fr  2 days    Chest Tube 2 Posterior;Mediastinal 32 Fr  2 days    Chest Tube 3 Anterior;Mediastinal 32 Fr  2 days                Physical Exam:    HEENT/NECK:  Normocephalic  Atraumatic  No jugular venous distention  Cardiac: Regular rate and rhythm and No murmurs/rubs/gallops  Pulmonary:  Breath sounds diminished in the bases bilaterally  and Crackles bilaterally  Abdomen:  Non-tender, Non-distended and Normal bowel sounds  Incisions: Sternum is stable  Incision dressed with Acticoat  No erythema or drainage  Extremities: Extremities warm/dry and No edema B/L  Neuro: Alert and oriented X 3, Sensation is grossly intact and No focal deficits  Skin: Warm/Dry, without rashes or lesions      Assessment:  Principal Problem:    Bicuspid aortic valve  Active Problems:    Nonrheumatic aortic valve stenosis    Ascending aortic aneurysm    Nonrheumatic aortic valve insufficiency    Pre-diabetes    Hyperlipidemia    S/P CABG x 1    Coronary artery disease    S/P AVR (aortic valve replacement)    Anticoagulated on Coumadin    Postoperative atrial fibrillation (HCC)    Hyponatremia    Postoperative anemia due to acute blood loss    Thrombocytopenia (HCC)    Leukocytosis       Aortic regurgitation, Aortic stenosis, Non-Rheumatic, Coronary artery disease, BAV  S/P aortic valve replacement and coronary artery bypass grafting x 1; POD # 3    Plan:    1  Cardiac:   NSR; HR/BP well-controlled  David off since last evening  Increase to Lopressor, 25mg PO BID  Continue ASA and Statin therapy  Epicardial pacing wires out  Maintain central IV access today for Amio infusion  Continue DVT prophylaxis  PO Afib - Converted to NSR  Continue Amio infusion and PO dosing  Hold on Coumadin tonight due to decreased Hg today    2  Pulmonary:   Good Room air oxygen saturation; Continue incentive spirometry/Coughing/Deep breathing exercises  Mediastinal CT output diminished, remove today  Check pleural CT output later today for possible removal    3  Renal:   Intake/Output net: -773 mL/24 hours  Diuretic Regimen:  Increase Lasix 40 mg IV TID  Start Potassium Chloride 20 mEq PO TID  Hypokalemia - increase diuretic and monitor  Continue fluid restriction  Post op Creatinine stable; Follow up labs prn    4  Neuro:  Neurologically intact; No active issues  Incisional pain well-controlled  Continue Tylenol, 975 mg PO q 8, standing dose  Continue Oxycodone, 2 5 to 5 mg PO q 4 hours prn pain    5  GI:  Tolerating TLC 2 3 gm sodium diet  Maintain 1800 mL daily fluid restriction   Continue stool softeners and prn suppository  Continue GI prophylaxis    6  Endo:   Glucose well-controlled with sliding scale coverage    7    Hematology:    Post-operative acute blood loss anemia; Hemoglobin 7 8; trend prn, Thrombocytopenia and Leukocytosis - downtrending  Continue to monitor  Afebrile  Encourage IS/ambulation  Start Iron and Vit C supplementation      8    Disposition:      Follow daily PT/OT recommendations regarding home vs  rehab when medically cleared for discharge    VTE Pharmacologic Prophylaxis: Fondaparinux (Arixtra)  VTE Mechanical Prophylaxis: sequential compression device    Collaborative rounds completed with supervising physician  Plan of care discussed with bedside nurse    SIGNATURE: Mayank Nunez  DATE: October 7, 2022  TIME: 8:10 AM

## 2022-10-07 NOTE — RESTORATIVE TECHNICIAN NOTE
Restorative Technician Note      Patient Name: Lizeth Gonzalez     Restorative Tech Visit Date: 10/7/2022  Note Type: Mobility  Patient Position Upon Consult: Bedside chair  Activity Performed: Ambulated  Assistive Device: Roller walker  Patient Position at End of Consult: All needs within reach;  Bedside chair

## 2022-10-07 NOTE — PLAN OF CARE
Problem: PHYSICAL THERAPY ADULT  Goal: Performs mobility at highest level of function for planned discharge setting  See evaluation for individualized goals  Description: Treatment/Interventions: Functional transfer training, LE strengthening/ROM, Elevations, Therapeutic exercise, Endurance training, Equipment eval/education, Bed mobility, Gait training, Spoke to nursing, Spoke to case management, OT  Equipment Recommended: Vinny Pascal (at this time)       See flowsheet documentation for full assessment, interventions and recommendations  Outcome: Progressing  Note: Prognosis: Good  Problem List: Decreased strength, Decreased endurance, Impaired balance, Decreased mobility, Pain  Assessment: Pt seen for PT session today with a focus on functional transfers, gait, and endurance  Pt is making slow, but steady progress towards mobility goals  Pt continues to require Jagruti for transfers and short distance ambulation with RW, limited by pain and dizziness  Pt with limited endurance and activity tolerance at this time, would benefit from continued acute skilled PT to address above deficits  Continuing to recommend HHPT upon d/c   Barriers to Discharge: Inaccessible home environment     PT Discharge Recommendation: Home with home health rehabilitation    See flowsheet documentation for full assessment

## 2022-10-07 NOTE — PHYSICAL THERAPY NOTE
Physical Therapy Treatment Note    Patient's Name: Joe Singh  : 1950       10/07/22 1005   PT Last Visit   PT Visit Date 10/07/22   Note Type   Note Type Treatment   Pain Assessment   Pain Assessment Tool FLACC   Pain Location/Orientation Location: [facility] Pain Intervention(s) Repositioned; Ambulation/increased activity   Pain Rating: FLACC (Rest) - Face 0   Pain Rating: FLACC (Rest) - Legs 0   Pain Rating: FLACC (Rest) - Activity 0   Pain Rating: FLACC (Rest) - Cry 0   Pain Rating: FLACC (Rest) - Consolability 0   Score: FLACC (Rest) 0   Pain Rating: FLACC (Activity) - Face 1   Pain Rating: FLACC (Activity) - Legs 0   Pain Rating: FLACC (Activity) - Activity 0   Pain Rating: FLACC (Activity) - Cry 1   Pain Rating: FLACC (Activity) - Consolability 0   Score: FLACC (Activity) 2   Restrictions/Precautions   Weight Bearing Precautions Per Order No   Other Precautions Cardiac/sternal;Multiple lines;Telemetry; Fall Risk;Pain  (CT, a-line)   General   Chart Reviewed Yes   Family/Caregiver Present No   Cognition   Overall Cognitive Status WFL   Attention Within functional limits   Orientation Level Oriented X4   Memory Decreased recall of precautions   Following Commands Follows one step commands without difficulty   Bed Mobility   Additional Comments pt seated OOB upon arrival   Transfers   Sit to Stand 4  Minimal assistance   Additional items Assist x 1; Armrests; Increased time required   Stand to Sit 4  Minimal assistance   Additional items Assist x 1; Armrests; Increased time required   Additional Comments transfers with RW   Ambulation/Elevation   Gait pattern Excessively slow; Short stride;Decreased foot clearance   Gait Assistance 4  Minimal assist   Additional items Assist x 1  (SBA of 2 more for lines/chair follow)   Assistive Device Rolling walker   Distance 100ft - distance limited by pt dizziness and pain; BP WNL on a-line   Balance   Static Sitting Fair +   Dynamic Sitting Fair   Static Standing Fair -   Dynamic Standing Poor +   Ambulatory Poor +   Endurance Deficit   Endurance Deficit Yes   Endurance Deficit Description weakness, fatigue   Activity Tolerance   Activity Tolerance Patient limited by fatigue;Patient limited by pain   Nurse Made Aware ok to see per RN   Assessment   Prognosis Good   Problem List Decreased strength;Decreased endurance; Impaired balance;Decreased mobility;Pain   Assessment Pt seen for PT session today with a focus on functional transfers, gait, and endurance  Pt is making slow, but steady progress towards mobility goals  Pt continues to require Jagruti for transfers and short distance ambulation with RW, limited by pain and dizziness  Pt with limited endurance and activity tolerance at this time, would benefit from continued acute skilled PT to address above deficits  Continuing to recommend HHPT upon d/c    Barriers to Discharge Inaccessible home environment   Goals   Patient Goals to get stronger   STG Expiration Date 10/15/22   PT Treatment Day 2   Plan   Treatment/Interventions Functional transfer training;LE strengthening/ROM; Elevations; Therapeutic exercise; Endurance training;Equipment eval/education; Bed mobility;Gait training;Spoke to nursing;Spoke to case management;OT   Progress Progressing toward goals   PT Frequency 4-6x/wk   Recommendation   PT Discharge Recommendation Home with home health rehabilitation   [address] [place] [address] Mobility Inpatient   Turning in Bed Without Bedrails 3   Lying on Back to Sitting on Edge of Flat Bed 3   Moving Bed to Chair 3   Standing Up From Chair 3   Walk in Room 3   Climb 3-5 Stairs 2   Basic Mobility Inpatient Raw Score 17   Basic Mobility Standardized Score 39 67   Highest Level Of Mobility   JH-HLM Goal 5: Stand one or more mins   JH-HLM Achieved 7: Walk 25 feet or more   End of Consult   Patient Position at End of Consult Bedside chair; All needs within reach       Waqar Pak, PT, DPT

## 2022-10-07 NOTE — DISCHARGE INSTRUCTIONS
Weight yourself daily  If you gain more than 3lbs in 24 hours or 5lbs in 5 days then call cardiologist to make appointment  Warfarin (By mouth)   Warfarin (WAR-far-in)  Prevents and treats blood clots  May lower the risk of serious complications after a heart attack  This medicine is a blood thinner  Brand Name(s): Coumadin, Jantoven   There may be other brand names for this medicine  When This Medicine Should Not Be Used: This medicine is not right for everyone  Do not use it if you had an allergic reaction to warfarin, if you are pregnant, or if you have health problems that could cause bleeding  How to Use This Medicine:   Tablet  Take your medicine as directed  Your dose may need to be changed several times to find what works best for you  This medicine should come with a Medication Guide  Ask your pharmacist for a copy if you do not have one  Missed dose: Take a dose as soon as you remember  If it is almost time for your next dose, wait until then and take a regular dose  Do not take extra medicine to make up for a missed dose  Store the medicine in a closed container at room temperature, away from heat, moisture, and direct light  Drugs and Foods to Avoid:   Ask your doctor or pharmacist before using any other medicine, including over-the-counter medicines, vitamins, and herbal products  Many medicines and foods can affect how warfarin works and may affect the PT/INR test results   Tell your doctor before you start or stop any medicine, especially the following:   Co-enzyme Q64, echinacea, garlic, ginkgo, ginseng, goldenseal, or Jeremiah's wort  Another blood thinner, including apixaban, argatroban, bivalirudin, cilostazol, clopidogrel, dabigatran, desirudin, dipyridamole, heparin, lepirudin, prasugrel, rivaroxaban, ticlopidine  Medicine to treat depression or anxiety, including citalopram, desvenlafaxine, duloxetine, escitalopram, fluoxetine, fluvoxamine, milnacipran, paroxetine, sertraline, venlafaxine, vilazodone  Medicine to treat an infection  NSAID pain or arthritis medicine, including aspirin, celecoxib, diclofenac, diflunisal, fenoprofen, ibuprofen, indomethacin, ketoprofen, ketorolac, mefenamic acid, naproxen, oxaprozin, piroxicam, sulindac  Check labels for over-the-counter medicines to find out if they contain an NSAID  Steroid medicine, including dexamethasone, hydrocortisone, methylprednisolone, prednisolone, prednisone  Warfarin works best if you eat about the same amount of vitamin K every day  Foods high in vitamin K include asparagus, broccoli, brussels sprouts, cabbage, green leafy vegetables, plums, rhubarb, and canola oil  Talk to your doctor before you make changes to your normal diet  Do not eat grapefruit or drink grapefruit juice while you are using this medicine  Warnings While Using This Medicine: It is not safe to take this medicine during pregnancy  It could harm an unborn baby  Tell your doctor right away if you become pregnant  Use an effective form of birth control to keep from getting pregnant during treatment and for at least 1 month after your last dose  Tell your doctor if you are breastfeeding, or if you have kidney disease, liver disease, heart disease, diabetes, heart failure, high blood pressure, an infection, a stomach ulcer, or protein C deficiency  Also tell your doctor if you had recent surgery or an injury, or a history of stroke, anemia, severe bleeding or bruising, or problems caused by heparin use  This medicine may cause the following problems:   Bleeding, which may be life-threatening  Gangrene (skin or tissue damage)  Calciphylaxis or calcium uremic arteriolopathy  Kidney problems, including acute kidney injury  Purple toes syndrome  You must have a PT/INR blood test while you use this medicine to check how well your blood is clotting  Your doctor will use the test results to make sure the medicine is working properly   Keep all appointments for the PT/INR blood tests  You may bleed or bruise more easily with warfarin  To prevent injury or cuts, do not play rough sports, be careful with sharp objects, and brush and floss your teeth gently  Imtiaz Fontan your nose gently, and do not pick your nose  Carry an ID card or wear a medical alert bracelet to let emergency caregivers know that you use warfarin  Tell any doctor or dentist who treats you that you are using this medicine  You may need to stop using this medicine several days before you have surgery or medical tests  Keep all medicine out of the reach of children  Never share your medicine with anyone  Possible Side Effects While Using This Medicine:   Call your doctor right away if you notice any of these side effects: Allergic reaction: Itching or hives, swelling in your face or hands, swelling or tingling in your mouth or throat, chest tightness, trouble breathing  Bleeding from your gums or nose, coughing up blood, heavy monthly periods  Bleeding that does not stop, bruising, or weakness  Dizziness, fainting, lightheadedness  Pain, brown or black skin, or skin that is cool to the touch  Purple toes or feet, or new pain in your leg, foot, or toes  Purplish red, net-like, blotchy spots on the skin  Red or dark brown urine, or red or black, tarry stools  Vomiting blood or material that looks like coffee grounds  If you notice other side effects that you think are caused by this medicine, tell your doctor  Call your doctor for medical advice about side effects  You may report side effects to FDA at 0-895-FDA-7520    © Copyright [employer] 2022 Information is for End User's use only and may not be sold, redistributed or otherwise used for commercial purposes  The above information is an  only  It is not intended as medical advice for individual conditions or treatments   Talk to your doctor, nurse or pharmacist before following any medical regimen to see if it is safe and effective for you   Sternal Precautions   AMBULATORY CARE:   Sternal precautions  are used to help protect your sternum (breastbone) after open chest surgery  Wires are placed during surgery to hold the sternum together as it heals  Sternal precautions help prevent the wires from cutting through the sternum  The precautions also help prevent the sternum from coming apart from an injury, and prevent pain and bleeding  You may need to use the precautions for up to 12 weeks after surgery  Your surgeon will give you specific instructions based on the type of surgery you had  It is important to follow the instructions carefully  An injury to the healing sternum can be life-threatening  General sternal precautions:  Start slowly and do more as you get stronger  Pain medicine might make it harder for you to know when to slow down or be careful  Stop immediately if you hear a crunch or pop in your sternum  Protect your sternum  Hug a pillow to your chest or cross your arms over your chest when you laugh, sneeze, or cough  Be careful when you get into or out of a chair or bed  Hug a pillow or cross your arms when you stand or sit  Do not twist as you move  Use only your legs to sit and stand  You may need to use a raised toilet seat if you have trouble standing up without using your arms  Your healthcare provider may teach you to use your elbow for support as you move from lying to sitting  Ask when you may take a bath or shower  You may need to use a bath chair if you have trouble getting into or out of the tub  Do not use a grab bar  Do not lift or carry anything heavier than 5 pounds  For example, a gallon of milk weighs 8 pounds  Keep your arms down as much as possible  Do not put your arms out to the side, behind you, or over your head  Do not let anyone pull your arms to help you move or dress  Do not reach for items  Do not push or pull anything  Examples include a car door or a vacuum        Do not drive while you are healing  Your surgeon will tell you when it is safe for you to start driving again  Call 911 for any of the following: You have sudden pain in your sternum and hear a crunch or pop  You have bleeding that does not stop even after you apply pressure for 5 minutes  Seek care immediately if:   You hear crunching or grinding in your sternum  You have signs of an infection, such as a fever, red or warm skin, or pus in the surgery wound  Contact your healthcare provider if:   You continue to feel pain, even after you take your pain medicine  You have new or worsening pain, or any pain with movement  You have questions or concerns about your condition or care  Care for your surgery wound:  Always wash your hands before you care for your wound  Wash your wound as directed  Do not rub the wound as you wash or dry the area  Pat the area dry with a clean towel  Change the bandages as directed and when they get wet or dirty  Do not smoke:  Nicotine can damage blood vessels and make it more difficult to heal  Do not use e-cigarettes or smokeless tobacco in place of cigarettes or to help you quit  They still contain nicotine  Ask your healthcare provider for information if you currently smoke and need help quitting  Follow up with your doctor as directed:  Write down your questions so you remember to ask them during your visits  © Copyright [employer] 2022 Information is for End User's use only and may not be sold, redistributed or otherwise used for commercial purposes  All illustrations and images included in CareNotes® are the copyrighted property of A D A M , Inc  or Mahsa Alfred   The above information is an  only  It is not intended as medical advice for individual conditions or treatments  Talk to your doctor, nurse or pharmacist before following any medical regimen to see if it is safe and effective for you    Narcotic Safety   WHAT YOU NEED TO KNOW:   What do I need to know about narcotic safety? Safety includes the correct use, storage, and disposal of narcotics  Examples of narcotic pain medicines are oxycodone, morphine, fentanyl, and codeine  How are narcotics given? Narcotics can be given as a pill, patch, or suppository  They can also be given as an injection into a vein, near a nerve, or into a joint  Your prescription may include one or both of the following:  Short-acting narcotics  work fast and relieve pain for about 3 to 6 hours  They are often used for acute or breakthrough pain  Long-acting narcotics  usually last at least 8 hours  You can take them less often and they may be used for chronic pain  How do I use narcotics safely? Take prescribed narcotics exactly as directed  Narcotics come with directions based on the kind and how it is given  Talk to your healthcare provider or a pharmacist if you have any questions  Do not take more than the recommended amount  Too much can cause a life-threatening overdose  Do not continue to take it after your pain stops  You may develop tolerance  This means you keep needing higher doses to get the same effect  You may also develop narcotic use disorder  This means you are not able to control your narcotic use  Do not give narcotics to others or take narcotics that belong to someone else  The kind or amount one person takes may not be right for another  The person you share them with may also be taking medicines that do not mix with narcotics  He or she may drink alcohol or use other drugs that can cause life-threatening problems when mixed with narcotics  Do not mix narcotics with other medicines or alcohol  The combination can cause an overdose, or cause you to stop breathing  Alcohol, sleeping pills, and medicines such as antihistamines can make you sleepy  A combination with narcotics can lead to a coma  Do not drive or operate heavy machinery after you use a narcotic    You may feel drowsy or have trouble concentrating  You can injure yourself or others if you drive or use heavy machinery when you are not alert  Your provider or pharmacist can tell you how long to wait after a dose before you do these activities  Talk to your healthcare provider if you have any side effects  Side effects include nausea, sleepiness, itching, and trouble thinking clearly  Your provider may need to make changes to the kind or amount of narcotic you are taking  He or she can also help you find ways to prevent or relieve side effects  What can I do to manage constipation? Constipation is the most common side effect of narcotic medicine  Constipation is when you have hard, dry bowel movements, or you go longer than usual between bowel movements  Tell your healthcare provider about all changes in your bowel movements while you are taking narcotics  He or she may recommend laxative medicine to help you have a bowel movement  He or she may also change the kind of narcotic you are taking, or change when you take it  The following are more ways you can prevent or relieve constipation:  Drink liquids as directed  You may need to drink extra liquids to help soften and move your bowels  Ask how much liquid to drink each day and which liquids are best for you  Eat high-fiber foods  This may help decrease constipation by adding bulk to your bowel movements  High-fiber foods include fruits, vegetables, whole-grain breads and cereals, and beans  Your healthcare provider or dietitian can help you create a high-fiber meal plan  Your provider may also recommend a fiber supplement if you cannot get enough fiber from food  Exercise regularly  Regular physical activity can help stimulate your intestines  Walking is a good exercise to prevent or relieve constipation  Ask which exercises are best for you  Schedule a time each day to have a bowel movement    This may help train your body to have regular bowel movements  Bend forward while you are on the toilet to help move the bowel movement out  Sit on the toilet for at least 10 minutes, even if you do not have a bowel movement  How do I store narcotics safely? Store narcotics where others cannot easily get them  Keep them in a locked cabinet or secure area  Do not  keep them in a purse or other bag you carry with you  A person may be looking for something else and find the narcotics  Make sure narcotics are stored out of the reach of children  A child can easily overdose on narcotics  Narcotics may look like candy to a small child  What is the best way to dispose of narcotics? The laws vary by country and area  In the [place], the best way is to return the narcotics through a take-back program  This program is offered by the [employer] ([employer])  The following are options for using the program:  Take the narcotics to a ELIS collection site  The site is often a law enforcement center  Call your local law enforcement center for scheduled take-back days in your area  You will be given information on where to go if the collection site is in a different location  Take the narcotics to an approved pharmacy or hospital   A pharmacy or hospital may be set up as a collection site  You will need to ask if it is a ELIS collection site if you were not directed there  A pharmacy or doctor's office may not be able to take back narcotics unless it is a ELIS site  Use a mail-back system  This means you are given containers to put the narcotics into  You will then mail them in the containers  Use a take-back drop box  This is a place to leave the narcotics at any time  People and animals will not be able to get into the box  Your local law enforcement agency can tell you where to find a drop box in your area  What are some other safe ways to dispose of narcotics? The medicine may come with disposal instructions   The instructions may vary depending on the brand of medicine you are using  Instructions may come in a Medication Guide, but not every medicine has one  You may instead get instructions from your pharmacy or doctor  Follow instructions carefully  The following are general guidelines to follow:  Find out if you can flush the narcotic  Some narcotics can be flushed down the toilet or poured into the sink  You will need to contact authorities in your area to see if this is an option for you  The FDA also offers a list of medicines that are safe to flush down the toilet  You can check the list if you cannot get the information for your local area  Ask your waste management company about rules for putting narcotics in the trash  The company will be able to give you specific directions  Scratch out personal information on the original medicine label so it cannot be read  Then put it in the trash  Do not label the trash or put any information on it about the narcotics  It should look like regular household trash so no one is tempted to look for the narcotics  Keep the trash out of the reach of children and animals  Always make sure trash is secure  Talk to officials if you live in a facility  If you live in a nursing home or assisted living center, talk to an official  The person will know the rules for your area  What are some other ways to manage pain? Ask your healthcare provider about non-narcotic medicines to control pain  Some medicines may even work better than narcotics, depending on the cause of your pain  Nonprescription medicines include NSAIDs (such as ibuprofen) and acetaminophen  Prescription medicines include muscle relaxers, antidepressants, and steroids  Pain may be managed without any medicines  Some ways to relieve pain include massage, aromatherapy, or meditation  Physical or occupational therapy may also help  Where can I find more information?    Drug Enforcement Administration  [address] [facility] , [address]  Phone: 2- [phone] - [phone]  Web Address: [facility]  usdo [email or web address]_[email or web address]    [address]  and Drug Administration  [place]ier[place] Villagomez , [address]  Phone: 1- 736 - [phone]  Web Address: http://[employer]/    Call your local emergency number (911 in the [address] [facility]), or have someone else call if:   You have a seizure  You cannot be woken  You have trouble staying awake and your breathing is slow or shallow  Your speech is slurred, or you are confused  You are dizzy or stumble when you walk  When should I or someone close to me call my doctor? You are extremely drowsy, or you have trouble staying awake or speaking  You have pale or clammy skin  You have blue fingernails or lips  Your heartbeat is slower than normal     You cannot stop vomiting  You have questions or concerns about your condition or care  CARE AGREEMENT:   You have the right to help plan your care  Learn about your health condition and how it may be treated  Discuss treatment options with your healthcare providers to decide what care you want to receive  You always have the right to refuse treatment  The above information is an  only  It is not intended as medical advice for individual conditions or treatments  Talk to your doctor, nurse or pharmacist before following any medical regimen to see if it is safe and effective for you  © Copyright [employer] 2022 Information is for End User's use only and may not be sold, redistributed or otherwise used for commercial purposes  All illustrations and images included in CareNotes® are the copyrighted property of A D A M , Inc  or [address] Healthy Diet   WHAT YOU NEED TO KNOW:   A heart healthy diet is an eating plan low in unhealthy fats and sodium (salt)  The plan is high in healthy fats and fiber   A heart healthy diet helps improve your cholesterol levels and lowers your risk for heart disease and stroke  A dietitian will teach you how to read and understand food labels  DISCHARGE INSTRUCTIONS:   Heart healthy diet guidelines to follow:   Choose foods that contain healthy fats  Unsaturated fats  include monounsaturated and polyunsaturated fats  Unsaturated fat is found in foods such as soybean, canola, olive, corn, and safflower oils  It is also found in soft tub margarine that is made with liquid vegetable oil  Omega-3 fat  is found in certain fish, such as salmon, tuna, and trout, and in walnuts and flaxseed  Eat fish high in omega-3 fats at least 2 times a week  Get 20 to 30 grams of fiber each day  Fruits, vegetables, whole-grain foods, and legumes (cooked beans) are good sources of fiber  Limit or do not have unhealthy fats  Cholesterol  is found in animal foods, such as eggs and lobster, and in dairy products made from whole milk  Limit cholesterol to less than 200 mg each day  Saturated fat  is found in meats, such as pritchett and hamburger  It is also found in chicken or turkey skin, whole milk, and butter  Limit saturated fat to less than 7% of your total daily calories  Trans fat  is found in packaged foods, such as potato chips and cookies  It is also in hard margarine, some fried foods, and shortening  Do not eat foods that contain trans fats  Limit sodium as directed  You may be told to limit sodium to 2,000 to 2,300 mg each day  Choose low-sodium or no-salt-added foods  Add little or no salt to food you prepare  Use herbs and spices in place of salt         Include the following in your heart healthy plan:  Ask your dietitian or healthcare provider how many servings to have from each of the following food groups:  Grains:      Whole-wheat breads, cereals, and pastas, and brown rice    Low-fat, low-sodium crackers and chips    Vegetables:      Broccoli, green beans, green peas, and spinach    Collards, kale, and lima beans    Carrots, sweet potatoes, tomatoes, and peppers    Canned vegetables with no salt added    Fruits:      Bananas, peaches, pears, and pineapple    Grapes, raisins, and dates    Oranges, tangerines, grapefruit, orange juice, and grapefruit juice    Apricots, mangoes, melons, and papaya    Raspberries and strawberries    Canned fruit with no added sugar    Low-fat dairy:      Nonfat (skim) milk, 1% milk, and low-fat almond, cashew, or soy milks fortified with calcium    Low-fat cheese, regular or frozen yogurt, and cottage cheese    Meats and proteins:      Lean cuts of beef and pork (loin, leg, round), skinless chicken and turkey    Legumes, soy products, egg whites, or nuts    Limit or do not include the following in your heart healthy plan:   Unhealthy fats and oils:      Whole or 2% milk, cream cheese, sour cream, or cheese    High-fat cuts of beef (T-bone steaks, ribs), chicken or turkey with skin, and organ meats such as liver    Butter, stick margarine, shortening, and cooking oils such as coconut or palm oil    Foods and liquids high in sodium:      Packaged foods, such as frozen dinners, cookies, macaroni and cheese, and cereals with more than 300 mg of sodium per serving    Vegetables with added sodium, such as instant potatoes, vegetables with added sauces, or regular canned vegetables    Cured or smoked meats, such as hot dogs, pritchett, and sausage    High-sodium ketchup, barbecue sauce, salad dressing, pickles, olives, soy sauce, or miso    Foods and liquids high in sugar:      Candy, cake, cookies, pies, or doughnuts    Soft drinks (soda), sports drinks, or sweetened tea    Canned or dry mixes for cakes, soups, sauces, or gravies    Other healthy heart guidelines:   Do not smoke  Nicotine and other chemicals in cigarettes and cigars can cause lung and heart damage  Ask your healthcare provider for information if you currently smoke and need help to quit  E-cigarettes or smokeless tobacco still contain nicotine   Talk to your healthcare provider before you use these products  Limit or do not drink alcohol as directed  Alcohol can damage your heart and raise your blood pressure  Your healthcare provider may give you specific daily and weekly limits  The general recommended limit is 1 drink a day for women 21 or older and for men 72 or older  Do not have more than 3 drinks in a day or 7 in a week  The recommended limit is 2 drinks a day for men 24to 59years of age  Do not have more than 4 drinks in a day or 14 in a week  A drink of alcohol is 12 ounces of beer, 5 ounces of wine, or 1½ ounces of liquor  Exercise regularly  Exercise can help you maintain a healthy weight and improve your blood pressure and cholesterol levels  Regular exercise can also decrease your risk for heart problems  Ask your healthcare provider about the best exercise plan for you  Do not start an exercise program without asking your healthcare provider  Follow up with your doctor or cardiologist as directed:  Write down your questions so you remember to ask them during your visits  © [employer] 2022 Information is for End User's use only and may not be sold, redistributed or otherwise used for commercial purposes  All illustrations and images included in CareNotes® are the copyrighted property of A D A M , Inc  or [address] [place] [address]  The above information is an  only  It is not intended as medical advice for individual conditions or treatments  Talk to your doctor, nurse or pharmacist before following any medical regimen to see if it is safe and effective for you  After CABG (Coronary Artery Bypass Graft)   AMBULATORY CARE:   After coronary artery bypass graft (CABG) surgery,  you may not be able to do your normal activities for a few months   Your sternum (breastbone) will need time to heal  For 6 to 8 weeks after surgery, you will need to go slowly and follow your healthcare provider's activity instructions  Call your local emergency number (911 in the [address] [facility]) for any of the following: You have any of the following signs of a heart attack:      Squeezing, pressure, or pain in your chest    You may  also have any of the following:     Discomfort or pain in your back, neck, jaw, stomach, or arm    Shortness of breath    Nausea or vomiting    Lightheadedness or a sudden cold sweat    You feel lightheaded, short of breath, and have chest pain  You cough up blood  You have a fast heartbeat that flutters  You feel like you are going to faint  Seek care immediately if:   Your arm or leg feels warm, tender, and painful  It may look swollen and red  You have numbness or tingling in your arms or legs  You have a severe headache  Call your doctor or cardiologist if:   You have a fever higher than 101°F (38 4°C)  You have gained 2 pounds in 24 hours  Your wound is red, swollen, or draining pus  Your signs and symptoms that you had before surgery return  You feel depressed  You have questions or concerns about your condition or care  Medicines: You may need any of the following:  Prescription pain medicine  may be given  Ask your healthcare provider how to take this medicine safely  Some prescription pain medicines contain acetaminophen  Do not take other medicines that contain acetaminophen without talking to your healthcare provider  Too much acetaminophen may cause liver damage  Prescription pain medicine may cause constipation  Ask your healthcare provider how to prevent or treat constipation  Antiplatelets , such as aspirin, help prevent blood clots  Take your antiplatelet medicine exactly as directed  These medicines make it more likely for you to bleed or bruise  If you are told to take aspirin, do not take acetaminophen or ibuprofen instead  Cholesterol medicine  helps decrease the amount of cholesterol in your blood   Cholesterol can cause plaque buildup that blocks your arteries  Antibiotics  help prevent a bacterial infection  Heart medicine  helps strengthen and regulate your heartbeat  Blood pressure medicine  helps lower or control your blood pressure  Take your medicine as directed  Contact your healthcare provider if you think your medicine is not helping or if you have side effects  Tell him of her if you are allergic to any medicine  Keep a list of the medicines, vitamins, and herbs you take  Include the amounts, and when and why you take them  Bring the list or the pill bottles to follow-up visits  Carry your medicine list with you in case of an emergency  Activity:  Your healthcare provider will give you specific activity instructions  The following are general guidelines to follow for up to 8 weeks after surgery:  Protect your sternum  Hug a pillow to your chest or cross your arms over your chest when you laugh, sneeze, or cough  Be careful when you get into or out of a chair or bed  Hug a pillow or cross your arms when you stand or sit  Do not twist as you move  Use only your legs to sit and stand  You may need to use a raised toilet seat if you have trouble standing up without using your arms  Your healthcare provider may teach you to use your elbow for support as you move from lying to sitting  Do not lift anything heavier than 5 pounds  until your healthcare provider says it is okay  For example, a gallon of milk weighs 8 pounds  Do not play sports that use your shoulder  Examples include tennis and golf  Do not drive  until your healthcare provider says it is okay  Keep your arms down as much as possible  Do not put your arms out to the side, behind you, or over your head  Do not let anyone pull your arms to help you move or dress  Do not reach for items  Do not push or pull anything  Examples include a car door or a vacuum       Care for your surgery area as directed:  Carefully wash around the area with soap and water  Let the soap and water run over the area  Do not scrub the area  If you do not have a bandage, gently pat the area dry with a clean towel  If you have a bandage, dry the area and put on a new, clean bandage  Change your bandage if it gets wet or dirty  Go to cardiac rehabilitation (rehab) as directed:  Cardiac rehab is a program run by specialists who help you safely strengthen your heart and prevent more heart disease  The plan includes exercise, relaxation, stress management, and heart-healthy nutrition  Healthcare providers will also check to make sure any medicines you take are working  The plan may also include instructions for when you can drive, return to work, and do other normal daily activities  Prevent another blocked artery:   Do not smoke  Nicotine and other chemicals in cigarettes and cigars can cause heart and lung damage  Ask your healthcare provider for information if you currently smoke and need help to quit  E-cigarettes or smokeless tobacco still contain nicotine  Talk to your healthcare provider before you use these products  Limit or do not drink alcohol as directed  Alcohol can raise your blood pressure and make your heart work harder  Alcohol can also increase your risk for heart disease or a stroke  Ask your healthcare provider if alcohol is okay for you  He or she can tell you how many drinks are okay to have within 24 hours and within 1 week  A drink is 12 ounces of beer, 5 ounces of wine, or 1½ ounces of liquor  Eat heart-healthy foods  You may need to eat foods that are low in salt, fat, or cholesterol  Healthy foods include fruits, vegetables, whole-grain breads, low-fat dairy products, beans, lean meats, and fish  Ask your healthcare provider for more information about a heart healthy diet  Maintain a healthy weight  Ask your healthcare provider what a healthy weight is for you  Extra weight can increase the stress on your heart   Ask your provider to help you create a safe weight loss plan if needed  Ask about vaccines you may need:  Vaccines help prevent diseases that can be dangerous for a person who has heart disease  Ask your healthcare provider about these and other vaccines you may need:  Get a flu vaccine  as soon as recommended each year, usually starting in September or October  Get a pneumonia vaccine  is recommended for all adults 72 or older  Your provider may also recommend this vaccine if you are younger than 72  COVID-19 vaccines are given as a shot in 1 or 2 doses  Vaccination is recommended for everyone 5 years or older  One 2-dose vaccine is fully approved  for those 16 years or older  This vaccine also has an emergency use authorization (EUA) for children 11to 13years old  No vaccine is currently available for children younger than 5 years  A booster (additional) dose  is given to help the immune system continue to protect against severe COVID-19  A booster is recommended for all adults 18 or older  The booster can be a different brand of the COVID-19 vaccine than you originally received  The timing for the booster depends on the type of vaccine you received:    1-dose vaccine: The booster is given at least 2 months after you received the vaccine  2-dose vaccine: The booster is given at least 5 or 6 months after the second dose  A booster can be given to adolescents 15to 16years old  Only 1 COVID-19 vaccine has this EUA  The booster is given at least 5 months after the second dose of the original vaccine series  A booster is recommended for immunocompromised children 11to 6years old  Only 1 COVID-19 vaccine has this EUA  The booster is given 28 days after the second dose  Follow up with your doctor or cardiologist as directed: You may need to go in regularly for tests to check how your heart is doing  Write down your questions so you remember to ask them during your visits    © Copyright [employer] 2022 Information is for [employer] use only and may not be sold, redistributed or otherwise used for commercial purposes  All illustrations and images included in CareNotes® are the copyrighted property of A D A M , Inc  or Mahsa Hobson  The above information is an  only  It is not intended as medical advice for individual conditions or treatments  Talk to your doctor, nurse or pharmacist before following any medical regimen to see if it is safe and effective for you

## 2022-10-07 NOTE — PROCEDURES
10/07/22    Procedure: Chest tube removal    Chest tubes removed in routine fashion without incident  Insertion site dressed with Acticoat  Jose Nelson tolerated the procedure well  Nurse notified      SIGNATURE: Author Burgos  DATE: October 7, 2022  TIME: 12:47 PM

## 2022-10-07 NOTE — RESTORATIVE TECHNICIAN NOTE
Restorative Technician Note      Patient Name: Katlin Barnhart Tech Visit Date: 10/7/2022  Note Type: Mobility  Patient Position Upon Consult: Bedside chair  Activity Performed: Ambulated  Assistive Device: Roller walker  Patient Position at End of Consult: All needs within reach;  Bedside chair

## 2022-10-08 LAB
ANION GAP SERPL CALCULATED.3IONS-SCNC: 6 MMOL/L (ref 4–13)
BUN SERPL-MCNC: 15 MG/DL (ref 5–25)
CALCIUM SERPL-MCNC: 8 MG/DL (ref 8.3–10.1)
CHLORIDE SERPL-SCNC: 101 MMOL/L (ref 96–108)
CO2 SERPL-SCNC: 27 MMOL/L (ref 21–32)
CREAT SERPL-MCNC: 0.73 MG/DL (ref 0.6–1.3)
ERYTHROCYTE [DISTWIDTH] IN BLOOD BY AUTOMATED COUNT: 12.8 % (ref 11.6–15.1)
GFR SERPL CREATININE-BSD FRML MDRD: 91 ML/MIN/1.73SQ M
GLUCOSE SERPL-MCNC: 113 MG/DL (ref 65–140)
GLUCOSE SERPL-MCNC: 116 MG/DL (ref 65–140)
GLUCOSE SERPL-MCNC: 123 MG/DL (ref 65–140)
GLUCOSE SERPL-MCNC: 126 MG/DL (ref 65–140)
GLUCOSE SERPL-MCNC: 149 MG/DL (ref 65–140)
HCT VFR BLD AUTO: 24.1 % (ref 36.5–49.3)
HGB BLD-MCNC: 8 G/DL (ref 12–17)
INR PPP: 1.54 (ref 0.84–1.19)
MCH RBC QN AUTO: 32.1 PG (ref 26.8–34.3)
MCHC RBC AUTO-ENTMCNC: 33.2 G/DL (ref 31.4–37.4)
MCV RBC AUTO: 97 FL (ref 82–98)
PLATELET # BLD AUTO: 160 THOUSANDS/UL (ref 149–390)
PMV BLD AUTO: 11.1 FL (ref 8.9–12.7)
POTASSIUM SERPL-SCNC: 3.3 MMOL/L (ref 3.5–5.3)
PROTHROMBIN TIME: 18.7 SECONDS (ref 11.6–14.5)
RBC # BLD AUTO: 2.49 MILLION/UL (ref 3.88–5.62)
SODIUM SERPL-SCNC: 134 MMOL/L (ref 135–147)
WBC # BLD AUTO: 10.73 THOUSAND/UL (ref 4.31–10.16)

## 2022-10-08 PROCEDURE — 85610 PROTHROMBIN TIME: CPT | Performed by: PHYSICIAN ASSISTANT

## 2022-10-08 PROCEDURE — 82948 REAGENT STRIP/BLOOD GLUCOSE: CPT

## 2022-10-08 PROCEDURE — 99024 POSTOP FOLLOW-UP VISIT: CPT | Performed by: THORACIC SURGERY (CARDIOTHORACIC VASCULAR SURGERY)

## 2022-10-08 PROCEDURE — 85027 COMPLETE CBC AUTOMATED: CPT | Performed by: PHYSICIAN ASSISTANT

## 2022-10-08 PROCEDURE — 99222 1ST HOSP IP/OBS MODERATE 55: CPT | Performed by: INTERNAL MEDICINE

## 2022-10-08 PROCEDURE — 80048 BASIC METABOLIC PNL TOTAL CA: CPT | Performed by: PHYSICIAN ASSISTANT

## 2022-10-08 RX ORDER — WARFARIN SODIUM 5 MG/1
5 TABLET ORAL
Status: COMPLETED | OUTPATIENT
Start: 2022-10-08 | End: 2022-10-08

## 2022-10-08 RX ADMIN — FUROSEMIDE 40 MG: 10 INJECTION, SOLUTION INTRAMUSCULAR; INTRAVENOUS at 12:23

## 2022-10-08 RX ADMIN — WARFARIN SODIUM 5 MG: 5 TABLET ORAL at 18:18

## 2022-10-08 RX ADMIN — METOPROLOL TARTRATE 25 MG: 25 TABLET, FILM COATED ORAL at 09:27

## 2022-10-08 RX ADMIN — CHLORHEXIDINE GLUCONATE 15 ML: 1.2 SOLUTION ORAL at 18:17

## 2022-10-08 RX ADMIN — FUROSEMIDE 40 MG: 10 INJECTION, SOLUTION INTRAMUSCULAR; INTRAVENOUS at 18:17

## 2022-10-08 RX ADMIN — AMIODARONE HYDROCHLORIDE 200 MG: 200 TABLET ORAL at 14:34

## 2022-10-08 RX ADMIN — FERROUS SULFATE TAB 325 MG (65 MG ELEMENTAL FE) 325 MG: 325 (65 FE) TAB at 06:51

## 2022-10-08 RX ADMIN — OXYCODONE HYDROCHLORIDE AND ACETAMINOPHEN 500 MG: 500 TABLET ORAL at 09:27

## 2022-10-08 RX ADMIN — BISACODYL 10 MG: 10 SUPPOSITORY RECTAL at 14:34

## 2022-10-08 RX ADMIN — ATORVASTATIN CALCIUM 80 MG: 80 TABLET, FILM COATED ORAL at 16:43

## 2022-10-08 RX ADMIN — AMIODARONE HYDROCHLORIDE 200 MG: 200 TABLET ORAL at 21:37

## 2022-10-08 RX ADMIN — METOPROLOL TARTRATE 25 MG: 25 TABLET, FILM COATED ORAL at 21:38

## 2022-10-08 RX ADMIN — POTASSIUM CHLORIDE 20 MEQ: 1500 TABLET, EXTENDED RELEASE ORAL at 06:51

## 2022-10-08 RX ADMIN — MUPIROCIN 1 APPLICATION: 20 OINTMENT TOPICAL at 21:38

## 2022-10-08 RX ADMIN — AMIODARONE HYDROCHLORIDE 200 MG: 200 TABLET ORAL at 06:51

## 2022-10-08 RX ADMIN — PANTOPRAZOLE SODIUM 40 MG: 40 TABLET, DELAYED RELEASE ORAL at 06:51

## 2022-10-08 RX ADMIN — OXYCODONE HYDROCHLORIDE 5 MG: 5 TABLET ORAL at 21:38

## 2022-10-08 RX ADMIN — MUPIROCIN 1 APPLICATION: 20 OINTMENT TOPICAL at 09:27

## 2022-10-08 RX ADMIN — FONDAPARINUX SODIUM 2.5 MG: 2.5 INJECTION, SOLUTION SUBCUTANEOUS at 09:26

## 2022-10-08 RX ADMIN — ASPIRIN 81 MG CHEWABLE TABLET 81 MG: 81 TABLET CHEWABLE at 09:27

## 2022-10-08 RX ADMIN — CHLORHEXIDINE GLUCONATE 15 ML: 1.2 SOLUTION ORAL at 09:26

## 2022-10-08 RX ADMIN — ACETAMINOPHEN 975 MG: 325 TABLET, FILM COATED ORAL at 21:37

## 2022-10-08 RX ADMIN — SENNOSIDES AND DOCUSATE SODIUM 1 TABLET: 8.6; 5 TABLET ORAL at 18:18

## 2022-10-08 RX ADMIN — ACETAMINOPHEN 975 MG: 325 TABLET, FILM COATED ORAL at 06:51

## 2022-10-08 RX ADMIN — AMIODARONE HYDROCHLORIDE 0.5 MG/MIN: 50 INJECTION, SOLUTION INTRAVENOUS at 00:01

## 2022-10-08 RX ADMIN — FUROSEMIDE 40 MG: 10 INJECTION, SOLUTION INTRAMUSCULAR; INTRAVENOUS at 06:51

## 2022-10-08 RX ADMIN — POLYETHYLENE GLYCOL 3350 17 G: 17 POWDER, FOR SOLUTION ORAL at 09:26

## 2022-10-08 RX ADMIN — SENNOSIDES AND DOCUSATE SODIUM 1 TABLET: 8.6; 5 TABLET ORAL at 09:27

## 2022-10-08 RX ADMIN — POTASSIUM CHLORIDE 20 MEQ: 1500 TABLET, EXTENDED RELEASE ORAL at 12:23

## 2022-10-08 RX ADMIN — POTASSIUM CHLORIDE 20 MEQ: 1500 TABLET, EXTENDED RELEASE ORAL at 16:43

## 2022-10-08 NOTE — PROGRESS NOTES
Progress Note - Cardiothoracic Surgery   Shahrzad Hughes 74 y o  male MRN: [id number]  Unit/Bed#: [facility] 428-01 Encounter: [id number]    Aortic regurgitation, Aortic stenosis, Non-Rheumatic, Coronary artery disease, BAV  S/P aortic valve replacement and coronary artery bypass grafting x 1; POD # 4      24 Hour Events:  Continues on Amio infusion 0 5mg/min  Remains NSR with no further episodes of Afib on telem review  Reports his abdomen is more distended, denies abd pain, but is passing flatus  Concerned he has not had a BM yet since surgery  Denies CP, palpitations or SOB      Medications:   Scheduled Meds:  Current Facility-Administered Medications   Medication Dose Route Frequency Provider Last Rate   • acetaminophen  650 mg Rectal Q4H PRN Carina Fuentes PA-C     • acetaminophen  975 mg Oral Q8H Carina Fuentes PA-C     • amiodarone  0 5 mg/min Intravenous Continuous Darby MarketJAMES 0 5 mg/min (10/08/22 0001)   • amiodarone  200 mg Oral [facility] Carina Fuentes PA-C     • ascorbic acid  500 mg Oral Daily [employer], PA-C     • aspirin  81 mg Oral Daily [facility]     • atorvastatin  80 mg Oral Daily With JAMES Petit     • bisacodyl  10 mg Rectal Daily PRN Carina Fuentes PA-C     • chlorhexidine  15 mL Swish & Spit BID Carina Fuentes PA-C     • ferrous sulfate  325 mg Oral Daily With Breakfast JAMES Marin     • fondaparinux  2 5 mg Subcutaneous Daily Carina Fuentes PA-C     • furosemide  40 mg Intravenous TID (diuretic) [place], PA-C     • insulin lispro  1-5 Units Subcutaneous TID AC [employer]AMES     • insulin lispro  1-5 Units Subcutaneous HS Darby MarketJAMES     • metoprolol tartrate  25 mg Oral Q12H [address] [place], [place]     • mupirocin  1 application Nasal T87N [address] Carina Fuentes PA-C     • ondansetron  4 mg Intravenous Q6H PRN Carina Fuentes PA-C     • oxyCODONE  2 5 mg Oral Q4H PRN Carina Fuentes PA-C     • oxyCODONE  5 mg Oral Q4H PRN Doroteo Levine PA-C     • pantoprazole  40 mg Oral Daily Doroteo Levine PA-C     • polyethylene glycol  17 g Oral Daily Doroteo Levine PA-C     • potassium chloride  20 mEq Oral TID With Meals Leonides Noriega PA-C     • senna-docusate sodium  1 tablet Oral BID Leonides Noriega PA-C       Continuous Infusions:amiodarone, 0 5 mg/min, Last Rate: 0 5 mg/min (10/08/22 0001)      PRN Meds: •  acetaminophen  •  bisacodyl  •  ondansetron  •  oxyCODONE  •  oxyCODONE    Vitals:   Vitals:    10/07/22 1930 10/07/22 2226 10/08/22 0333 10/08/22 0638   BP: 108/78 101/63 124/78    BP Location: Left arm Left arm Left arm    Pulse: 80 79 69    Resp: 17 16 17    Temp: 98 4 °F (36 9 °C) 98 8 °F (37 1 °C) 98 2 °F (36 8 °C)    TempSrc: Oral Oral Oral    SpO2: 97% 96% 95%    Weight:    88 5 kg (195 lb 1 7 oz)   Height:           Telemetry: NSR; Heart Rate: 69    Respiratory:   SpO2: SpO2: 95 %;  Room Air    Intake/Output: -815    Intake/Output Summary (Last 24 hours) at 10/8/2022 0708  Last data filed at 10/8/2022 0703  Gross per 24 hour   Intake 434 2 ml   Output 1950 ml   Net -1515 8 ml        Chest tube Output: removed    Weights:   Weight (last 2 days)     Date/Time Weight    10/08/22 0638 88 5 (195 11)    10/07/22 0600 85 4 (188 27)    10/06/22 0600 71 4 (157 41)            Results:   Results from last 7 days   Lab Units 10/08/22  0431 10/07/22  0429 10/06/22  0359   WBC Thousand/uL 10 73* 12 52* 13 57*   HEMOGLOBIN g/dL 8 0* 7 8* 8 8*   HEMATOCRIT % 24 1* 23 6* 26 5*   PLATELETS Thousands/uL 160 117* 121*     Results from last 7 days   Lab Units 10/08/22  0431 10/07/22  0429 10/06/22  0359 10/04/22  1805 10/04/22  1400   SODIUM mmol/L 134* 130* 132*   < >  --    POTASSIUM mmol/L 3 3* 3 5 3 8   < >  --    CHLORIDE mmol/L 101 99 102   < >  --    CO2 mmol/L 27 26 24   < >  --    CO2, I-STAT mmol/L  --   --   --   --  25   BUN mg/dL 15 20 18   < >  --    CREATININE mg/dL 0 73 0 74 0 80   < >  --    GLUCOSE, ISTAT mg/dl  --   -- --   --  146*   CALCIUM mg/dL 8 0* 8 0* 7 9*   < >  --     < > = values in this interval not displayed  Results from last 7 days   Lab Units 10/08/22  0431 10/06/22  0840 10/04/22  1615 10/04/22  1357   INR  1 54* 1 37* 1 23* 1 47*   PTT seconds  --   --   --  54*     Coumadin mg - 0 5  -        Point of care glucose:  - 164    2 units SSIC/24 hrs    Studies:  No studies past 24 hrs        Invasive Lines/Tubes:  Invasive Devices  Report    Central Venous Catheter Line  Duration           CVC Central Lines 10/04/22 Triple 3 days          Peripheral Intravenous Line  Duration           Peripheral IV 10/04/22 Right Hand 3 days                Physical Exam:    HEENT/NECK:  Normocephalic  Atraumatic  No jugular venous distention  Cardiac: Regular rate and rhythm and No murmurs/rubs/gallops  Pulmonary:  Breath sounds clear bilaterally and No rales/rhonchi/wheezes  Abdomen:  Non-tender, Normal bowel sounds, Distended and soft  Incisions: Sternum is stable  Incision is clean, dry, and intact  Extremities: Extremities warm/dry and Trace edema B/L  Neuro: Alert and oriented X 3, Sensation is grossly intact and No focal deficits  Skin: Warm/Dry, without rashes or lesions  Assessment:  Principal Problem:    Bicuspid aortic valve  Active Problems:    Nonrheumatic aortic valve stenosis    Ascending aortic aneurysm    Nonrheumatic aortic valve insufficiency    Pre-diabetes    Hyperlipidemia    S/P CABG x 1    Coronary artery disease    S/P AVR (aortic valve replacement)    Anticoagulated on Coumadin    Postoperative atrial fibrillation (HCC)    Hyponatremia    Postoperative anemia due to acute blood loss    Thrombocytopenia (HCC)    Leukocytosis       Aortic regurgitation, Aortic stenosis, Non-Rheumatic, Coronary artery disease, BAV  S/P aortic valve replacement and coronary artery bypass grafting x 1; POD # 4    Plan:    1  Cardiac:   NSR; HR/BP well-controlled     Continue Lopressor, 25mg PO BID  Continue ASA and Statin therapy  Epicardial pacing wires out  Continue central IV access today   Continue DVT prophylaxis  PO Afib - Remains NSR  Discontinue Amio infusion and continue PO dosing  Dose Coumadin 5mg tonight    2  Pulmonary:   Good Room air oxygen saturation; Continue incentive spirometry/Coughing/Deep breathing exercises  CTs removed    3  Renal:   Intake/Output net: -815 mL/24 hours  Diuretic Regimen:  Increase Lasix 40 mg IV TID  Start Potassium Chloride 20 mEq PO TID  Hypokalemia - increase diuretic and monitor  Continue fluid restriction  Post op Creatinine stable; Follow up labs prn    4  Neuro:  Neurologically intact; No active issues  Incisional pain well-controlled  Continue Tylenol, 975 mg PO q 8, standing dose  Continue Oxycodone, 2 5 to 5 mg PO q 4 hours prn pain    5  GI:  Tolerating TLC 2 3 gm sodium diet  Maintain 1800 mL daily fluid restriction   Continue stool softeners and give suppository today  Continue GI prophylaxis    6  Endo:   Glucose well-controlled with sliding scale coverage    7    Hematology:    Post-operative acute blood loss anemia; Hemoglobin 8 0; trend prn, Thrombocytopenia improving and Leukocytosis downtrending  Continue to monitor  Afebrile  Encourage IS/ambulation  Continue Iron and Vit C supplementation  8    Disposition:      Follow daily PT/OT recommendations regarding home vs  rehab when medically cleared for discharge - likely within next 24 hrs      VTE Pharmacologic Prophylaxis: Fondaparinux (Arixtra)  VTE Mechanical Prophylaxis: sequential compression device    Collaborative rounds completed with supervising physician  Plan of care discussed with bedside nurse    SIGNATURE: Francesca Gabriel  DATE: October 8, 2022  TIME: 7:08 AM

## 2022-10-08 NOTE — CONSULTS
Reason for Consult / Principal Problem:  AFib    Physician Requesting Consult:  Vlad Neri MD    Cardiologist: Dr James Lucio and Plan      Current Problem List   Principal Problem:    Bicuspid aortic valve  Active Problems:    Nonrheumatic aortic valve stenosis    Ascending aortic aneurysm    Nonrheumatic aortic valve insufficiency    Pre-diabetes    Hyperlipidemia    S/P CABG x 1    Coronary artery disease    S/P AVR (aortic valve replacement)    Anticoagulated on Coumadin    Postoperative atrial fibrillation (HCC)    Hyponatremia    Postoperative anemia due to acute blood loss    Thrombocytopenia (HCC)    Leukocytosis    Assessment/Plan: This is 74 year male with past medical history of bicuspid aortic valve, aortic stenosis, CAD who is postop for CABG x1 to LAD and tissue AVR  Patient was found to have a brief episode of postop Afib for which he was started on amiodarone, metoprolol and warfarin for anticoagulation by primary team   Cardiology was consulted for further management of AFib  # brief postop Afib- noted on EKG, has been in normal sinus rhythm now  # bicuspid aortic valve, AS- status post tissue AVR  # CAD- 80% lad s/p CABG x1      Plan:    · Patient now in normal sinus rhythm  No more episodes of AFib on tele  Was asymptomatic when he had AFib  · Patient started on p o  Amiodarone, Lopressor and warfarin for anticoagulation by primary team   · Will likely not require lifelong anticoagulation given brief postop AFib  · Can consider outpatient monitor to look for burden of Afib  · Rest of the care per primary team               Subjective     CC:  AFib    HPI: Sonia Barb 74y o  year old male with past medical history of bicuspid aortic valve, aortic stenosis, CAD who is now postop for CABG x1 to LAD and tissue AVR  Patient is postop day 3   Patient was found to have a brief episode of postop Afib for which he was started on amiodarone, metoprolol and warfarin for anticoagulation by primary team   Cardiology was consulted for further management of AFib  As per the EKG, patient had 1 of the EKG showing AFib  Patient was asymptomatic at the time  Denies any past medical history of irregular rhythm  Denies any alcohol or drug use  Patient admits that he did not feel when he was in AFib postoperatively  Patient has been in normal sinus rhythm since the episode of AFib on tele  On my exam, patient is alert and oriented x3  Denies any chest pain or shortness of breath  Family History: non-contributory  Historical Information   Past Medical History:   Diagnosis Date   • Aortic stenosis    • Ascending aortic aneurysm    • Hyperlipidemia    • Hypertension    • Nonrheumatic aortic valve insufficiency    • Osteoarthritis    • Prostatitis      Past Surgical History:   Procedure Laterality Date   • CARDIAC CATHETERIZATION N/A 9/30/2022    Procedure: Cardiac RHC/LHC; Surgeon: Reshma Morocho MD;  Location: [facility]; Service: Cardiology   • COLONOSCOPY  08/09/2017   • CORONARY ARTERY BYPASS GRAFT N/A 10/4/2022    Procedure: CABG X 1, LIMA TO LAD;  Surgeon: Priyanka Glover MD;  Location: [facility];  Service: Cardiac Surgery   • EYE MUSCLE SURGERY      stigmatism correction age 13   • INGUINAL HERNIA REPAIR Left    • ND RPLCMT AORTIC VALVE OPN W/STENTLESS TISSUE VALVE N/A 10/4/2022    Procedure: REPLACEMENT VALVE AORTIC (AVR) INSPIRIS RESILIA 25MM;   Surgeon: Priyanka Glover MD;  Location: [facility];  Service: Cardiac Surgery   • SKIN CANCER EXCISION     • TONSILECTOMY AND ADNOIDECTOMY       Social History   Social History     Substance and Sexual Activity   Alcohol Use Not Currently   • Alcohol/week: 28 0 standard drinks   • Types: 14 Glasses of wine, 14 Shots of liquor per week    Comment: 2 small glasses of wine & 2 shots whiskey daily     Social History     Substance and Sexual Activity   Drug Use Not Currently     Social History Tobacco Use   Smoking Status Never Smoker   Smokeless Tobacco Never Used     Family History: History reviewed  No pertinent family history  Review of Systems:  Review of Systems   Constitutional: Negative for activity change, chills, diaphoresis, fatigue and fever  HENT: Negative for congestion, rhinorrhea, sinus pressure and sinus pain  Respiratory: Negative for apnea, cough, shortness of breath and stridor  Cardiovascular: Negative for chest pain, palpitations and leg swelling  Gastrointestinal: Negative for abdominal distention, abdominal pain, blood in stool, constipation and diarrhea  Endocrine: Negative for cold intolerance, heat intolerance and polyuria  Genitourinary: Negative for difficulty urinating  Musculoskeletal: Negative for arthralgias, back pain, joint swelling and myalgias  Skin: Negative for color change, pallor, rash and wound  Neurological: Negative for dizziness, seizures, syncope, light-headedness, numbness and headaches  Psychiatric/Behavioral: Negative for agitation and hallucinations  The patient is not nervous/anxious and is not hyperactive              Scheduled Meds:  Current Facility-Administered Medications   Medication Dose Route Frequency Provider Last Rate   • acetaminophen  650 mg Rectal Q4H PRN Felicia Holbrook PA-C     • acetaminophen  975 mg Oral Q8H Felicia Holbrook PA-C     • amiodarone  200 mg Oral [facility] Felicia Holbrook PA-C     • ascorbic acid  500 mg Oral Daily [place], [place]     • aspirin  [facility]     • atorvastatin  80 mg Oral Daily With JAMES Petit     • bisacodyl  10 mg Rectal Daily PRN Felicia Holbrook PA-C     • chlorhexidine  15 mL Swish & Spit BID Felicia Holbrook PA-C     • ferrous sulfate  325 mg Oral Daily With Breakfast JAMES Marin     • fondaparinux  2 5 mg Subcutaneous Daily Felicia Holbrook PA-C     • furosemide  40 mg Intravenous TID (diuretic) JAMES Marin     • insulin lispro  1-5 Units Subcutaneous TID AC Kitty Nieves PA-C     • insulin lispro  1-5 Units Subcutaneous HS Kitty Nieves PA-C     • metoprolol tartrate  25 mg Oral Q12H [facility] [place]     • mupirocin  1 application Nasal I69M [facility] [place], [place]     • ondansetron  4 mg Intravenous Q6H PRN Doe Coffman PA-C     • oxyCODONE  2 5 mg Oral Q4H PRN Doe Coffman PA-C     • oxyCODONE  5 mg Oral Q4H PRN Doe Coffman PA-C     • pantoprazole  40 mg Oral Daily Doe Coffman PA-C     • polyethylene glycol  17 g Oral Daily Doe Coffman PA-C     • potassium chloride  20 mEq Oral TID With Meals Quinton Plascencia PA-C     • senna-docusate sodium  1 tablet Oral BID Quinton Plascencia PA-C     • warfarin  5 mg Oral Once (warfarin) Quinton Plascencia PA-C       Continuous Infusions:   PRN Meds: •  acetaminophen  •  bisacodyl  •  ondansetron  •  oxyCODONE  •  oxyCODONE  all current active meds have been reviewed    Allergies   Allergen Reactions   • Pollen Extract Nasal Congestion       Objective   Vitals: Temp (24hrs), Av 6 °F (37 °C), Min:98 2 °F (36 8 °C), Max:99 °F (37 2 °C)  Current: Temperature: 99 °F (37 2 °C)  Patient Vitals for the past 24 hrs:   BP Temp Temp src Pulse Resp SpO2 Weight   10/08/22 0930 126/75 -- -- 82 -- 96 % --   10/08/22 0748 130/79 99 °F (37 2 °C) -- 79 19 95 % --   10/08/22 0638 -- -- -- -- -- -- 88 5 kg (195 lb 1 7 oz)   10/08/22 0600 -- -- -- -- -- -- 88 5 kg (195 lb 1 7 oz)   10/08/22 0333 124/78 98 2 °F (36 8 °C) Oral 69 17 95 % --   10/07/22 2226 101/63 98 8 °F (37 1 °C) Oral 79 16 96 % --   10/07/22 1930 108/78 98 4 °F (36 9 °C) Oral 80 17 97 % --   10/07/22 1700 116/75 98 5 °F (36 9 °C) Oral 75 17 98 % --   10/07/22 1157 118/74 98 7 °F (37 1 °C) Oral 75 18 96 % --    Body mass index is 27 21 kg/m²    Orthostatic Blood Pressures    Flowsheet Row Most Recent Value   Blood Pressure 126/75 filed at 10/08/2022 0930   Patient Position - Orthostatic VS Lying filed at 10/08/2022 0333              Invasive Devices  Report    Central Venous Catheter Line  Duration           CVC Central Lines 10/04/22 Triple 4 days          Peripheral Intravenous Line  Duration           Peripheral IV 10/04/22 Right Hand 4 days                Physical Exam:  Physical Exam  Constitutional:       General: He is not in acute distress  Appearance: He is well-developed  He is not diaphoretic  HENT:      Head: Normocephalic and atraumatic  Nose: Nose normal       Mouth/Throat:      Pharynx: No oropharyngeal exudate  Eyes:      General: No scleral icterus  Right eye: No discharge  Left eye: No discharge  Cardiovascular:      Rate and Rhythm: Normal rate and regular rhythm  Heart sounds: Normal heart sounds  No murmur heard  No friction rub  No gallop  Pulmonary:      Effort: Pulmonary effort is normal  No respiratory distress  Breath sounds: No stridor  No wheezing or rales  Abdominal:      General: Bowel sounds are normal  There is no distension  Palpations: Abdomen is soft  Tenderness: There is no abdominal tenderness  There is no guarding  Musculoskeletal:         General: Normal range of motion  Cervical back: Normal range of motion and neck supple  Skin:     General: Skin is warm  Findings: No erythema  Neurological:      Mental Status: He is alert and oriented to person, place, and time               Lab Results:   Results from last 7 days   Lab Units 10/08/22  0431 10/07/22  0429 10/06/22  0359 10/05/22  0411 10/04/22  1615 10/04/22  1400 10/04/22  1357 10/04/22  1248 10/04/22  1246 10/04/22  1159 10/04/22  1141 10/04/22  1120 10/04/22  1101 10/04/22  1055 10/04/22  1051 10/04/22  1022 10/04/22  0836   WBC Thousand/uL 10 73* 12 52* 13 57* 8 86 14 90*  --   --   --   --   --   --   --   --   --   --   --   --    HEMOGLOBIN g/dL 8 0* 7 8* 8 8* 8 5* 10 5*  --  10 9*  --   --   --   --   --   --   --   --   --   --    I STAT HEMOGLOBIN g/dl  --   --   --   --   --  9 5*  --   --  9 2* 10 2* 9 9* 9 5* 9 5* 8 8*  --  11 9* 12 9   HEMATOCRIT % 24 1* 23 6* 26 5* 25 9* 32 5*  --  32 5*  --   --   --   --   --   --   --   --   --   --    HEMATOCRIT, ISTAT %  --   --   --   --   --  28*  --   --  27* 30* 29* 28* 28* 26*  --  35* 38   PLATELETS Thousands/uL 160 117* 121* 102* 134*  --  126* 92*  --   --   --   --   --   --  113*  --   --    NEUTROS PCT %  --  79*  --   --   --   --   --   --   --   --   --   --   --   --   --   --   --    MONOS PCT %  --  8  --   --   --   --   --   --   --   --   --   --   --   --   --   --   --       Results from last 7 days   Lab Units 10/08/22  0431 10/07/22  0429 10/06/22  0840 10/06/22  0359 10/05/22  1925 10/05/22  0411 10/04/22  1805 10/04/22  1615 10/04/22  1400 10/04/22  1357 10/04/22  1246 10/04/22  1159 10/04/22  1141 10/04/22  1120 10/04/22  1101 10/04/22  1055 10/04/22  1022 10/04/22  0836   SODIUM mmol/L 134* 130*  --  132* 132* 138  --   --   --  139  --   --   --   --   --   --   --   --    POTASSIUM mmol/L 3 3* 3 5  --  3 8 4 0 4 0 4 0  --   --  4 2  --   --   --   --   --   --   --   --    CHLORIDE mmol/L 101 99  --  102 102 109*  --   --   --  112*  --   --   --   --   --   --   --   --    CO2 mmol/L 27 26  --  24 23 24  --   --   --  22  --   --   --   --   --   --   --   --    CO2, I-STAT mmol/L  --   --   --   --   --   --   --   --  25  --  24 30 30 29 25 27 27 27   BUN mg/dL 15 20  --  18 18 12  --   --   --  12  --   --   --   --   --   --   --   --    CREATININE mg/dL 0 73 0 74  --  0 80 0 96 0 64  --   --   --  0 76  --   --   --   --   --   --   --   --    CALCIUM mg/dL 8 0* 8 0*  --  7 9* 8 1* 7 5*  --   --   --  8 6  --   --   --   --   --   --   --   --    GLUCOSE, ISTAT mg/dl  --   --   --   --   --   --   --   --  146*  --  187* 200* 195* 170* 140 135 132 120   MAGNESIUM mg/dL  --  2 1  --   --  2 3 2 3  --   --   --   --   --   --   --   --   --   --   --   --    INR  1 54* --  1 37*  --   --   --   --  1 23*  --  1 47*  --   --   --   --   --   --   --   --    PTT seconds  --   --   --   --   --   --   --   --   --  54*  --   --   --   --   --   --   --   --    EGFR ml/min/1 73sq m 91 90  --  88 77 96  --   --   --  89  --   --   --   --   --   --   --   --      Results from last 7 days   Lab Units 10/08/22  0431 10/06/22  0840 10/04/22  1615 10/04/22  1357   INR  1 54* 1 37* 1 23* 1 47*   PTT seconds  --   --   --  54*             No results found for: PHART, GOH3LLC, PO2ART, HDJ7VXW, C9SIOPGP, BEART, SOURCE  No components found for: HIV1X2  No results found for: HAV, HEPAIGM, HEPBIGM, HEPBCAB, HBEAG, HEPCAB  No results found for: SPEP, UPEP   Lab Results   Component Value Date    HGBA1C 5 7 (H) 09/23/2022    HGBA1C 5 9 (H) 04/12/2022    HGBA1C 5 8 (H) 09/03/2021     No results found for: CHOL   Lab Results   Component Value Date    HDL 75 09/23/2022      Lab Results   Component Value Date    LDLCALC 148 (H) 09/23/2022      Lab Results   Component Value Date    TRIG 76 09/23/2022     No components found for: PROCAL          Imaging: I have personally reviewed pertinent reports

## 2022-10-08 NOTE — RESTORATIVE TECHNICIAN NOTE
Restorative Technician Note      Patient Name: Pavan Smith     Restorative Tech Visit Date: 10/8/2022  Note Type: Mobility  Patient Position Upon Consult: Bedside chair  Activity Performed: Ambulated  Assistive Device: Roller walker  Patient Position at End of Consult: All needs within reach;  Bedside chair

## 2022-10-08 NOTE — RESTORATIVE TECHNICIAN NOTE
Restorative Technician Note      Patient Name: Lauren Hyde     Restorative Tech Visit Date: 10/8/2022  Note Type: Mobility  Patient Position Upon Consult: Bedside chair  Activity Performed: Ambulated  Assistive Device: Roller walker  Patient Position at End of Consult: All needs within reach;  Bedside chair

## 2022-10-09 LAB
GLUCOSE SERPL-MCNC: 101 MG/DL (ref 65–140)
GLUCOSE SERPL-MCNC: 123 MG/DL (ref 65–140)
GLUCOSE SERPL-MCNC: 137 MG/DL (ref 65–140)
INR PPP: 1.51 (ref 0.84–1.19)
PROTHROMBIN TIME: 18.4 SECONDS (ref 11.6–14.5)

## 2022-10-09 PROCEDURE — 99232 SBSQ HOSP IP/OBS MODERATE 35: CPT | Performed by: INTERNAL MEDICINE

## 2022-10-09 PROCEDURE — 82948 REAGENT STRIP/BLOOD GLUCOSE: CPT

## 2022-10-09 PROCEDURE — 85610 PROTHROMBIN TIME: CPT | Performed by: PHYSICIAN ASSISTANT

## 2022-10-09 PROCEDURE — 99024 POSTOP FOLLOW-UP VISIT: CPT | Performed by: PHYSICIAN ASSISTANT

## 2022-10-09 RX ORDER — POTASSIUM CHLORIDE 20 MEQ/1
20 TABLET, EXTENDED RELEASE ORAL 2 TIMES DAILY
Status: DISCONTINUED | OUTPATIENT
Start: 2022-10-09 | End: 2022-10-10

## 2022-10-09 RX ORDER — ZOLPIDEM TARTRATE 5 MG/1
5 TABLET ORAL
Status: DISCONTINUED | OUTPATIENT
Start: 2022-10-09 | End: 2022-10-10 | Stop reason: HOSPADM

## 2022-10-09 RX ORDER — FUROSEMIDE 10 MG/ML
40 INJECTION INTRAMUSCULAR; INTRAVENOUS
Status: DISCONTINUED | OUTPATIENT
Start: 2022-10-09 | End: 2022-10-10

## 2022-10-09 RX ORDER — WARFARIN SODIUM 7.5 MG/1
7.5 TABLET ORAL
Status: COMPLETED | OUTPATIENT
Start: 2022-10-09 | End: 2022-10-09

## 2022-10-09 RX ADMIN — WARFARIN SODIUM 7.5 MG: 7.5 TABLET ORAL at 18:02

## 2022-10-09 RX ADMIN — FERROUS SULFATE TAB 325 MG (65 MG ELEMENTAL FE) 325 MG: 325 (65 FE) TAB at 09:03

## 2022-10-09 RX ADMIN — ACETAMINOPHEN 975 MG: 325 TABLET, FILM COATED ORAL at 06:03

## 2022-10-09 RX ADMIN — POLYETHYLENE GLYCOL 3350 17 G: 17 POWDER, FOR SOLUTION ORAL at 08:50

## 2022-10-09 RX ADMIN — SENNOSIDES AND DOCUSATE SODIUM 1 TABLET: 8.6; 5 TABLET ORAL at 18:02

## 2022-10-09 RX ADMIN — AMIODARONE HYDROCHLORIDE 1 MG/MIN: 50 INJECTION, SOLUTION INTRAVENOUS at 08:41

## 2022-10-09 RX ADMIN — ASPIRIN 81 MG CHEWABLE TABLET 81 MG: 81 TABLET CHEWABLE at 08:48

## 2022-10-09 RX ADMIN — SENNOSIDES AND DOCUSATE SODIUM 1 TABLET: 8.6; 5 TABLET ORAL at 08:49

## 2022-10-09 RX ADMIN — PANTOPRAZOLE SODIUM 40 MG: 40 TABLET, DELAYED RELEASE ORAL at 06:03

## 2022-10-09 RX ADMIN — ATORVASTATIN CALCIUM 80 MG: 80 TABLET, FILM COATED ORAL at 18:02

## 2022-10-09 RX ADMIN — MUPIROCIN 1 APPLICATION: 20 OINTMENT TOPICAL at 08:49

## 2022-10-09 RX ADMIN — CHLORHEXIDINE GLUCONATE 15 ML: 1.2 SOLUTION ORAL at 08:48

## 2022-10-09 RX ADMIN — CHLORHEXIDINE GLUCONATE 15 ML: 1.2 SOLUTION ORAL at 18:02

## 2022-10-09 RX ADMIN — FUROSEMIDE 40 MG: 10 INJECTION, SOLUTION INTRAMUSCULAR; INTRAVENOUS at 06:03

## 2022-10-09 RX ADMIN — ACETAMINOPHEN 650 MG: 325 TABLET, FILM COATED ORAL at 22:08

## 2022-10-09 RX ADMIN — POTASSIUM CHLORIDE 20 MEQ: 1500 TABLET, EXTENDED RELEASE ORAL at 18:02

## 2022-10-09 RX ADMIN — MUPIROCIN 1 APPLICATION: 20 OINTMENT TOPICAL at 22:09

## 2022-10-09 RX ADMIN — POTASSIUM CHLORIDE 20 MEQ: 1500 TABLET, EXTENDED RELEASE ORAL at 08:49

## 2022-10-09 RX ADMIN — AMIODARONE HYDROCHLORIDE 200 MG: 200 TABLET ORAL at 06:03

## 2022-10-09 RX ADMIN — FONDAPARINUX SODIUM 2.5 MG: 2.5 INJECTION, SOLUTION SUBCUTANEOUS at 08:50

## 2022-10-09 RX ADMIN — METOPROLOL TARTRATE 25 MG: 25 TABLET, FILM COATED ORAL at 22:09

## 2022-10-09 RX ADMIN — ACETAMINOPHEN 975 MG: 325 TABLET, FILM COATED ORAL at 13:31

## 2022-10-09 RX ADMIN — ZOLPIDEM TARTRATE 5 MG: 5 TABLET, COATED ORAL at 22:11

## 2022-10-09 RX ADMIN — OXYCODONE HYDROCHLORIDE AND ACETAMINOPHEN 500 MG: 500 TABLET ORAL at 08:49

## 2022-10-09 RX ADMIN — AMIODARONE HYDROCHLORIDE 200 MG: 200 TABLET ORAL at 22:08

## 2022-10-09 RX ADMIN — METOPROLOL TARTRATE 25 MG: 25 TABLET, FILM COATED ORAL at 08:49

## 2022-10-09 RX ADMIN — FUROSEMIDE 40 MG: 10 INJECTION, SOLUTION INTRAMUSCULAR; INTRAVENOUS at 18:02

## 2022-10-09 RX ADMIN — AMIODARONE HYDROCHLORIDE 200 MG: 200 TABLET ORAL at 13:31

## 2022-10-09 RX ADMIN — DEXTROSE 150 MG: 50 INJECTION, SOLUTION INTRAVENOUS at 08:35

## 2022-10-09 NOTE — PROGRESS NOTES
Cardiology Progress Note - Kj Adams 74 y o  male MRN: [id number]    Unit/Bed#: [facility] 428-01 Encounter: [id number]        Subjective: Went into afib this AM   Somewhat dyspneic  Review of Systems   Cardiovascular: Negative for chest pain, leg swelling and palpitations  Respiratory: Negative for shortness of breath  Neurological: Positive for dizziness  Objective:   Vitals: Blood pressure 111/73, pulse (!) 122, temperature (!) 97 3 °F (36 3 °C), resp  rate 18, height 5' 11" (1 803 m), weight 85 3 kg (188 lb 0 8 oz), SpO2 93 %  , Body mass index is 26 23 kg/m² ,   Orthostatic Blood Pressures    Flowsheet Row Most Recent Value   Blood Pressure 111/73 filed at 10/09/2022 0715   Patient Position - Orthostatic VS Lying filed at 10/09/2022 1250         Systolic (85LDL), CGO:803 , Min:99 , OUB:635     Diastolic (54DCD), ZFW:86, Min:63, Max:75      Intake/Output Summary (Last 24 hours) at 10/9/2022 0852  Last data filed at 10/9/2022 0647  Gross per 24 hour   Intake 357 06 ml   Output 1700 ml   Net -1342 94 ml     Weight (last 2 days)     Date/Time Weight    10/09/22 0600 85 3 (188 05)    10/08/22 0638 88 5 (195 11)    10/08/22 0600 88 5 (195 11)    10/07/22 0600 85 4 (188 27)              Telemetry Review: Afib with RVR    Physical Exam  Cardiovascular:      Rate and Rhythm: Tachycardia present  Rhythm irregularly irregular  Heart sounds: Normal heart sounds  No murmur heard  No friction rub  No gallop  Pulmonary:      Breath sounds: Normal breath sounds  No wheezing or rales             Laboratory Results:        CBC with diff:   Results from last 7 days   Lab Units 10/08/22  0431 10/07/22  0429 10/06/22  0359 10/05/22  0411 10/04/22  1615 10/04/22  1400 10/04/22  1357 10/04/22  1248   WBC Thousand/uL 10 73* 12 52* 13 57* 8 86 14 90*  --   --   --    HEMOGLOBIN g/dL 8 0* 7 8* 8 8* 8 5* 10 5*  --  10 9*  --    I STAT HEMOGLOBIN g/dl  --   --   --   --   --  9 5*  --   --    HEMATOCRIT % 24 1* 23 6* 26 5* 25 9* 32 5*  --  32 5*  --    HEMATOCRIT, ISTAT %  --   --   --   --   --  28*  --   --    MCV fL 97 98 97 99* 97  --   --   --    PLATELETS Thousands/uL 160 117* 121* 102* 134*  --  126* 92*   MCH pg 32 1 32 4 32 1 32 3 31 3  --   --   --    MCHC g/dL 33 2 33 1 33 2 32 8 32 3  --   --   --    RDW % 12 8 12 8 12 9 12 9 12 7  --   --   --    MPV fL 11 1 11 7 11 7 10 9 10 6  --  10 4 11 3   NRBC AUTO /100 WBCs  --  0  --   --   --   --   --   --          CMP:  Results from last 7 days   Lab Units 10/08/22  0431 10/07/22  0429 10/06/22  0359 10/05/22  1925 10/05/22  0411 10/04/22  1805 10/04/22  1400 10/04/22  1357 10/04/22  1246 10/04/22  1159 10/04/22  1141 10/04/22  1120 10/04/22  1101 10/04/22  1055   POTASSIUM mmol/L 3 3* 3 5 3 8 4 0 4 0 4 0  --  4 2  --   --   --   --   --   --    CHLORIDE mmol/L 101 99 102 102 109*  --   --  112*  --   --   --   --   --   --    CO2 mmol/L 27 26 24 23 24  --   --  22  --   --   --   --   --   --    CO2, I-STAT mmol/L  --   --   --   --   --   --  25  --  24 30 30 29 25 27   BUN mg/dL 15 20 18 18 12  --   --  12  --   --   --   --   --   --    CREATININE mg/dL 0 73 0 74 0 80 0 96 0 64  --   --  0 76  --   --   --   --   --   --    GLUCOSE, ISTAT mg/dl  --   --   --   --   --   --  146*  --  187* 200* 195* 170* 140 135   CALCIUM mg/dL 8 0* 8 0* 7 9* 8 1* 7 5*  --   --  8 6  --   --   --   --   --   --    EGFR ml/min/1 73sq m 91 90 88 77 96  --   --  89  --   --   --   --   --   --          BMP:  Results from last 7 days   Lab Units 10/08/22  0431 10/07/22  0429 10/06/22  0359 10/05/22  1925 10/05/22  0411 10/04/22  1805 10/04/22  1400 10/04/22  1357   POTASSIUM mmol/L 3 3* 3 5 3 8 4 0 4 0 4 0  --  4 2   CHLORIDE mmol/L 101 99 102 102 109*  --   --  112*   CO2 mmol/L 27 26 24 23 24  --   --  22   CO2, I-STAT mmol/L  --   --   --   --   --   --  25  --    BUN mg/dL 15 20 18 18 12  --   --  12   CREATININE mg/dL 0 73 0 74 0 80 0 96 0 64  --   --  0 76   GLUCOSE, ISTAT mg/dl  --   --   --   --   --   --  146*  --    CALCIUM mg/dL 8 0* 8 0* 7 9* 8 1* 7 5*  --   --  8 6       BNP:   Results Reviewed     None        No results for input(s): BNP in the last 72 hours  Magnesium:   Results from last 7 days   Lab Units 10/07/22  0429 10/05/22  1925 10/05/22  0411   MAGNESIUM mg/dL 2 1 2 3 2 3       Coags:   Results from last 7 days   Lab Units 10/09/22  0427 10/08/22  0431 10/06/22  0840 10/04/22  1615 10/04/22  1357   PTT seconds  --   --   --   --  54*   INR  1 51* 1 54* 1 37* 1 23* 1 47*       TSH:       Lipid Profile:             Cardiac testing:   No results found for this or any previous visit  No results found for this or any previous visit  No results found for this or any previous visit  No results found for this or any previous visit        Meds/Allergies   Current Facility-Administered Medications   Medication Dose Route Frequency Provider Last Rate   • acetaminophen  650 mg Rectal Q4H PRN Carina Fuentes PA-C     • acetaminophen  975 mg Oral Q8H Carina Fuentes PA-C     • amiodarone  1 mg/min Intravenous Continuous JAMES Marin 1 mg/min (10/09/22 0841)    Followed by   • amiodarone  0 5 mg/min Intravenous Continuous JAMES Marin     • amiodarone  200 mg Oral [facility] Carina Fuentes PA-C     • ascorbic acid  500 mg Oral Daily [place], PA-C     • aspirin  81 mg Oral Daily [facility]     • atorvastatin  80 mg Oral Daily With JAMES Petit     • bisacodyl  10 mg Rectal Daily PRN Carina Fuentes PA-C     • chlorhexidine  15 mL Swish & Spit BID Carina Fuentes PA-C     • ferrous sulfate  325 mg Oral Daily With Breakfast JAMES Marin     • fondaparinux  2 5 mg Subcutaneous Daily Carina Fuentes PA-C     • furosemide  40 mg Intravenous BID (diuretic) [place], PA-C     • insulin lispro  1-5 Units Subcutaneous TID AC [facility] [employer]     • insulin lispro  1-5 Units Subcutaneous  [employer] JAMES     • metoprolol tartrate  25 mg Oral Q12H [address] Jeane Weldon PA-C     • mupirocin  1 application Nasal M69Z [address] Jeane Weldon PA-C     • ondansetron  4 mg Intravenous Q6H PRN Lukasz Singh PA-C     • oxyCODONE  2 5 mg Oral Q4H PRN Lukasz Singh PA-C     • oxyCODONE  5 mg Oral Q4H PRN Lukasz Singh PA-C     • pantoprazole  40 mg Oral Daily Lukasz Singh PA-C     • polyethylene glycol  17 g Oral Daily Lukasz Singh PA-C     • potassium chloride  20 mEq Oral BID Mary Mckeon PA-C     • senna-docusate sodium  1 tablet Oral BID Mary Mckeon PA-C     • warfarin  7 5 mg Oral Once (warfarin) Mary Mckeon PA-C     • zolpidem  5 mg Oral HS PRN Mary Mckeon PA-C       amiodarone, 1 mg/min, Last Rate: 1 mg/min (10/09/22 0841)   Followed by  amiodarone, 0 5 mg/min      Medications Prior to Admission   Medication   • aspirin (ECOTRIN LOW STRENGTH) 81 mg EC tablet   • mupirocin (BACTROBAN) 2 % ointment   • Bioflavonoid Products (FRANNY C PO)   • Cyanocobalamin (VITAMIN B 12 PO)   • lisinopril (ZESTRIL) 20 mg tablet   • Magnesium 100 MG CAPS   • multivitamin (THERAGRAN) TABS   • Saw Palmetto 450 MG CAPS       Assessment:  Principal Problem:    Bicuspid aortic valve  Active Problems:    Nonrheumatic aortic valve stenosis    Ascending aortic aneurysm    Nonrheumatic aortic valve insufficiency    Pre-diabetes    Hyperlipidemia    S/P CABG x 1    Coronary artery disease    S/P AVR (aortic valve replacement)    Anticoagulated on Coumadin    Postoperative atrial fibrillation (HCC)    Hyponatremia    Postoperative anemia due to acute blood loss    Thrombocytopenia (HCC)    Leukocytosis        Impression:  1  S/P CABG x 1/AVR - hemodynamically stable  2  PAF - on amio gtt  On anticoagulation       Recommendations:  1  Continue current medications  2  Continue amio gtt

## 2022-10-09 NOTE — PROGRESS NOTES
Progress Note - Cardiothoracic Surgery   Freddiea Brooks 74 y o  male MRN: [id number]  Unit/Bed#: [facility] 428-01 Encounter: [id number]    Aortic regurgitation, Aortic stenosis, Non-Rheumatic, Coronary artery disease, BAV  S/P aortic valve replacement and coronary artery bypass grafting x 1; POD # 5      24 Hour Events:  Went into rapid Afib this morning, rates 120's  Reports feeling lightheaded, overall unwell and mild conversational dyspnea  Had BM yesterday  Requesting adjustment of sleeping medication due to inability to sleep in hospital   Reports concern over tongue discoloration, denies any pain or functional issues      Medications:   Scheduled Meds:  Current Facility-Administered Medications   Medication Dose Route Frequency Provider Last Rate   • acetaminophen  650 mg Rectal Q4H PRN Doroteo Levine PA-C     • acetaminophen  975 mg Oral Q8H Doroteo Levine PA-C     • amiodarone  1 mg/min Intravenous Continuous Leonides Noriega PA-C      Followed by   • amiodarone  0 5 mg/min Intravenous Continuous Leonides Noriega PA-C     • amiodarone (CORDARONE) IV bolus  150 mg Intravenous Once Leonides Noriega PA-C     • amiodarone  200 mg Oral [facility] Doroteo Levine PA-C     • ascorbic acid  500 mg Oral Daily Leonides Noriega PA-C     • aspirin  81 mg Oral Daily Garry Kesy PA-C     • atorvastatin  80 mg Oral Daily With JAMES Petit     • bisacodyl  10 mg Rectal Daily PRN Doroteo Levine PA-C     • chlorhexidine  15 mL Swish & Spit BID Doroteo Levine PA-C     • ferrous sulfate  325 mg Oral Daily With Breakfast Leonides Noriega PA-C     • fondaparinux  2 5 mg Subcutaneous Daily Doroteo Levine PA-C     • furosemide  40 mg Intravenous TID (diuretic) Leonides Noriega PA-C     • insulin lispro  1-5 Units Subcutaneous TID AC Garry Keys PA-C     • insulin lispro  1-5 Units Subcutaneous HS Garry Keys PA-C     • metoprolol tartrate  25 mg Oral Q12H [address] Leonides Noriega PA-C     • mupirocin  1 application Nasal L13F [address]     • ondansetron  4 mg Intravenous Q6H PRN [place], JAMES     • oxyCODONE  2 5 mg Oral Q4H PRN [place], JAMES     • oxyCODONE  5 mg Oral Q4H PRN [place], JAMES     • pantoprazole  40 mg Oral Daily [place], JAMES     • polyethylene glycol  17 g Oral Daily [place] Lights, JAMES     • potassium chloride  20 mEq Oral TID With Meals Kamila Simmons PA-C     • senna-docusate sodium  1 tablet Oral BID Kamila Simmons PA-C       Continuous Infusions:amiodarone, 1 mg/min   Followed by  amiodarone, 0 5 mg/min      PRN Meds: •  acetaminophen  •  bisacodyl  •  ondansetron  •  oxyCODONE  •  oxyCODONE    Vitals:   Vitals:    10/08/22 2223 10/09/22 0247 10/09/22 0600 10/09/22 0715   BP: 99/63 102/65  111/73   BP Location: Left arm Left arm     Pulse: 75 72  (!) 122   Resp: 18 18  18   Temp: 98 6 °F (37 °C) 98 3 °F (36 8 °C)  (!) 97 3 °F (36 3 °C)   TempSrc: Oral Oral     SpO2: 94% 90%  93%   Weight:   85 3 kg (188 lb 0 8 oz)    Height:           Telemetry: Afib; Heart Rate: 74    Respiratory:   SpO2: SpO2: 93 %;  Room Air    Intake/Output: -2642 ml/24 hrs    Intake/Output Summary (Last 24 hours) at 10/9/2022 0723  Last data filed at 10/9/2022 0647  Gross per 24 hour   Intake 357 06 ml   Output 2300 ml   Net -1942 94 ml        Chest tube Output: removed    Weights:   Weight (last 2 days)     Date/Time Weight    10/09/22 0600 85 3 (188 05)    10/08/22 0638 88 5 (195 11)    10/08/22 0600 88 5 (195 11)    10/07/22 0600 85 4 (188 27)            Results:   Results from last 7 days   Lab Units 10/08/22  0431 10/07/22  0429 10/06/22  0359   WBC Thousand/uL 10 73* 12 52* 13 57*   HEMOGLOBIN g/dL 8 0* 7 8* 8 8*   HEMATOCRIT % 24 1* 23 6* 26 5*   PLATELETS Thousands/uL 160 117* 121*     Results from last 7 days   Lab Units 10/08/22  0431 10/07/22  0429 10/06/22  0359 10/04/22  1805 10/04/22  1400   SODIUM mmol/L 134* 130* 132*   < >  --    POTASSIUM mmol/L 3 3* 3 5 3 8   < >  -- CHLORIDE mmol/L 101 99 102   < >  --    CO2 mmol/L 27 26 24   < >  --    CO2, I-STAT mmol/L  --   --   --   --  25   BUN mg/dL 15 20 18   < >  --    CREATININE mg/dL 0 73 0 74 0 80   < >  --    GLUCOSE, ISTAT mg/dl  --   --   --   --  146*   CALCIUM mg/dL 8 0* 8 0* 7 9*   < >  --     < > = values in this interval not displayed  Results from last 7 days   Lab Units 10/09/22  0427 10/08/22  0431 10/06/22  0840 10/04/22  1615 10/04/22  1357   INR  1 51* 1 54* 1 37*   < > 1 47*   PTT seconds  --   --   --   --  54*    < > = values in this interval not displayed  Coumadin mg 5 0 5  -        Point of care glucose:  - 149    No SSIC/24 hrs    Studies:  No studies past 24 hrs        Invasive Lines/Tubes:  Invasive Devices  Report    Central Venous Catheter Line  Duration           CVC Central Lines 10/04/22 Triple 4 days          Peripheral Intravenous Line  Duration           Peripheral IV 10/04/22 Right Hand 4 days                Physical Exam:    HEENT/NECK:  Normocephalic  Atraumatic  No jugular venous distention  Tongue with light black discoloration, non-friable  Normal motor function   Cardiac: Irregularly irregular rate and rhythm and No murmurs/rubs/gallops  Pulmonary:  Breath sounds clear bilaterally and No rales/rhonchi/wheezes  Abdomen:  Non-tender, Non-distended and Normal bowel sounds  Incisions: Sternum is stable  Incision is clean, dry, and intact  Extremities: Extremities warm/dry and No edema B/L  Neuro: Alert and oriented X 3, Sensation is grossly intact and No focal deficits  Skin: Warm/Dry, without rashes or lesions        Assessment:  Principal Problem:    Bicuspid aortic valve  Active Problems:    Nonrheumatic aortic valve stenosis    Ascending aortic aneurysm    Nonrheumatic aortic valve insufficiency    Pre-diabetes    Hyperlipidemia    S/P CABG x 1    Coronary artery disease    S/P AVR (aortic valve replacement)    Anticoagulated on Coumadin    Postoperative atrial fibrillation ([address] )    Hyponatremia    Postoperative anemia due to acute blood loss    Thrombocytopenia (HCC)    Leukocytosis       Aortic regurgitation, Aortic stenosis, Non-Rheumatic, Coronary artery disease, BAV  S/P aortic valve replacement and coronary artery bypass grafting x 1; POD # 5    Plan:    1  Cardiac:   NSR; HR/BP well-controlled  Continue Lopressor, 25mg PO BID  Continue ASA and Statin therapy  Epicardial pacing wires out  Continue central IV access today   Continue DVT prophylaxis  PO Afib - went back into rapid Afib this morning  Rebolus Amio and restart infusion and continue PO dosing  Dose Coumadin 7 5mg tonight    2  Pulmonary:   Good Room air oxygen saturation; Continue incentive spirometry/Coughing/Deep breathing exercises  CTs removed    3  Renal:   Intake/Output net: -2642 mL/24 hours  Diuretic Regimen:  Decrease Lasix 40 mg IV BID   Potassium Chloride 20 mEq PO BID  Hypokalemia - increase diuretic and monitor  Continue fluid restriction  Post op Creatinine stable; Follow up labs prn    4  Neuro:  Neurologically intact; No active issues  Insomnia - give ambien qhs PRN  Incisional pain well-controlled  Continue Tylenol, 975 mg PO q 8, standing dose  Continue Oxycodone, 2 5 to 5 mg PO q 4 hours prn pain    5  GI:  Tolerating TLC 2 3 gm sodium diet  Maintain 1800 mL daily fluid restriction   Continue stool softeners and give suppository today  Continue GI prophylaxis    6  Endo:   Glucose well-controlled with sliding scale coverage    7    Hematology:    Post-operative acute blood loss anemia; Hemoglobin 8 0; trend prn, Thrombocytopenia improving and Leukocytosis downtrending  Continue to monitor  Afebrile  Encourage IS/ambulation  Continue Iron and Vit C supplementation  Tongue discoloration possibly related to Iron supplementation vs trauma from intubation/YOBANY probe  Monitor    8     Disposition:      Follow daily PT/OT recommendations regarding home vs  rehab when medically cleared for discharge     VTE Pharmacologic Prophylaxis: Fondaparinux (Arixtra)  VTE Mechanical Prophylaxis: sequential compression device    Collaborative rounds completed with supervising physician  Plan of care discussed with bedside nurse    SIGNATURE: Pamella Hernandez  DATE: October 9, 2022  TIME: 7:23 AM

## 2022-10-09 NOTE — RESTORATIVE TECHNICIAN NOTE
Restorative Technician Note      Patient Name: Christina [facility]     Restorative Tech Visit Date: 10/9/2022  Note Type: Mobility  Patient Position Upon Consult: Bedside chair  Activity Performed: Ambulated  Assistive Device: Roller walker  Patient Position at End of Consult: All needs within reach;  Bedside chair

## 2022-10-10 VITALS
RESPIRATION RATE: 16 BRPM | WEIGHT: 187.61 LBS | OXYGEN SATURATION: 97 % | HEIGHT: 71 IN | BODY MASS INDEX: 26.27 KG/M2 | HEART RATE: 70 BPM | SYSTOLIC BLOOD PRESSURE: 95 MMHG | TEMPERATURE: 98.3 F | DIASTOLIC BLOOD PRESSURE: 62 MMHG

## 2022-10-10 LAB
ANION GAP SERPL CALCULATED.3IONS-SCNC: 5 MMOL/L (ref 4–13)
BUN SERPL-MCNC: 18 MG/DL (ref 5–25)
CALCIUM SERPL-MCNC: 8.2 MG/DL (ref 8.3–10.1)
CHLORIDE SERPL-SCNC: 104 MMOL/L (ref 96–108)
CO2 SERPL-SCNC: 27 MMOL/L (ref 21–32)
CREAT SERPL-MCNC: 0.77 MG/DL (ref 0.6–1.3)
ERYTHROCYTE [DISTWIDTH] IN BLOOD BY AUTOMATED COUNT: 13.2 % (ref 11.6–15.1)
GFR SERPL CREATININE-BSD FRML MDRD: 89 ML/MIN/1.73SQ M
GLUCOSE SERPL-MCNC: 109 MG/DL (ref 65–140)
GLUCOSE SERPL-MCNC: 114 MG/DL (ref 65–140)
GLUCOSE SERPL-MCNC: 118 MG/DL (ref 65–140)
GLUCOSE SERPL-MCNC: 118 MG/DL (ref 65–140)
HCT VFR BLD AUTO: 25.3 % (ref 36.5–49.3)
HGB BLD-MCNC: 8.5 G/DL (ref 12–17)
INR PPP: 2.04 (ref 0.84–1.19)
MCH RBC QN AUTO: 32 PG (ref 26.8–34.3)
MCHC RBC AUTO-ENTMCNC: 33.6 G/DL (ref 31.4–37.4)
MCV RBC AUTO: 95 FL (ref 82–98)
PLATELET # BLD AUTO: 241 THOUSANDS/UL (ref 149–390)
PMV BLD AUTO: 9.9 FL (ref 8.9–12.7)
POTASSIUM SERPL-SCNC: 3.4 MMOL/L (ref 3.5–5.3)
PROTHROMBIN TIME: 23.3 SECONDS (ref 11.6–14.5)
RBC # BLD AUTO: 2.66 MILLION/UL (ref 3.88–5.62)
SODIUM SERPL-SCNC: 136 MMOL/L (ref 135–147)
WBC # BLD AUTO: 10 THOUSAND/UL (ref 4.31–10.16)

## 2022-10-10 PROCEDURE — 99024 POSTOP FOLLOW-UP VISIT: CPT | Performed by: PHYSICIAN ASSISTANT

## 2022-10-10 PROCEDURE — 85610 PROTHROMBIN TIME: CPT | Performed by: PHYSICIAN ASSISTANT

## 2022-10-10 PROCEDURE — 80048 BASIC METABOLIC PNL TOTAL CA: CPT | Performed by: PHYSICIAN ASSISTANT

## 2022-10-10 PROCEDURE — 99232 SBSQ HOSP IP/OBS MODERATE 35: CPT | Performed by: INTERNAL MEDICINE

## 2022-10-10 PROCEDURE — 82948 REAGENT STRIP/BLOOD GLUCOSE: CPT

## 2022-10-10 PROCEDURE — 85027 COMPLETE CBC AUTOMATED: CPT | Performed by: PHYSICIAN ASSISTANT

## 2022-10-10 PROCEDURE — 97116 GAIT TRAINING THERAPY: CPT

## 2022-10-10 PROCEDURE — 97530 THERAPEUTIC ACTIVITIES: CPT

## 2022-10-10 RX ORDER — OXYCODONE HYDROCHLORIDE 5 MG/1
5 TABLET ORAL EVERY 6 HOURS PRN
Qty: 28 TABLET | Refills: 0 | Status: SHIPPED | OUTPATIENT
Start: 2022-10-10 | End: 2022-10-18

## 2022-10-10 RX ORDER — WARFARIN SODIUM 5 MG/1
5 TABLET ORAL
Status: COMPLETED | OUTPATIENT
Start: 2022-10-10 | End: 2022-10-10

## 2022-10-10 RX ORDER — POTASSIUM CHLORIDE 20 MEQ/1
20 TABLET, EXTENDED RELEASE ORAL DAILY
Qty: 7 TABLET | Refills: 0 | Status: SHIPPED | OUTPATIENT
Start: 2022-10-11 | End: 2022-10-18

## 2022-10-10 RX ORDER — POTASSIUM CHLORIDE 20 MEQ/1
20 TABLET, EXTENDED RELEASE ORAL DAILY
Status: DISCONTINUED | OUTPATIENT
Start: 2022-10-10 | End: 2022-10-10 | Stop reason: HOSPADM

## 2022-10-10 RX ORDER — AMIODARONE HYDROCHLORIDE 200 MG/1
TABLET ORAL
Qty: 42 TABLET | Refills: 0 | Status: SHIPPED | OUTPATIENT
Start: 2022-10-10

## 2022-10-10 RX ORDER — SENNOSIDES 8.6 MG
650 CAPSULE ORAL EVERY 8 HOURS PRN
Qty: 30 TABLET | Refills: 0 | Status: SHIPPED | OUTPATIENT
Start: 2022-10-10 | End: 2022-11-09

## 2022-10-10 RX ORDER — POLYETHYLENE GLYCOL 3350 17 G/17G
17 POWDER, FOR SOLUTION ORAL DAILY
Qty: 510 G | Refills: 0 | Status: SHIPPED | OUTPATIENT
Start: 2022-10-10 | End: 2022-10-14 | Stop reason: ALTCHOICE

## 2022-10-10 RX ORDER — ASCORBIC ACID 500 MG
500 TABLET ORAL DAILY
Qty: 90 TABLET | Refills: 0 | Status: SHIPPED | OUTPATIENT
Start: 2022-10-11 | End: 2023-01-09

## 2022-10-10 RX ORDER — TORSEMIDE 20 MG/1
20 TABLET ORAL DAILY
Status: DISCONTINUED | OUTPATIENT
Start: 2022-10-10 | End: 2022-10-10 | Stop reason: HOSPADM

## 2022-10-10 RX ORDER — FERROUS SULFATE 325(65) MG
325 TABLET ORAL
Qty: 90 TABLET | Refills: 0 | Status: SHIPPED | OUTPATIENT
Start: 2022-10-11 | End: 2023-01-09

## 2022-10-10 RX ORDER — ATORVASTATIN CALCIUM 80 MG/1
80 TABLET, FILM COATED ORAL
Qty: 30 TABLET | Refills: 2 | Status: SHIPPED | OUTPATIENT
Start: 2022-10-10

## 2022-10-10 RX ORDER — OMEPRAZOLE 20 MG/1
20 CAPSULE, DELAYED RELEASE ORAL DAILY
Qty: 30 CAPSULE | Refills: 0 | Status: SHIPPED | OUTPATIENT
Start: 2022-10-10 | End: 2022-11-09

## 2022-10-10 RX ORDER — TORSEMIDE 20 MG/1
20 TABLET ORAL DAILY
Qty: 7 TABLET | Refills: 0 | Status: SHIPPED | OUTPATIENT
Start: 2022-10-11 | End: 2022-10-18

## 2022-10-10 RX ORDER — WARFARIN SODIUM 5 MG/1
5 TABLET ORAL
Qty: 60 TABLET | Refills: 2 | Status: SHIPPED | OUTPATIENT
Start: 2022-10-10

## 2022-10-10 RX ADMIN — ASPIRIN 81 MG CHEWABLE TABLET 81 MG: 81 TABLET CHEWABLE at 08:20

## 2022-10-10 RX ADMIN — ATORVASTATIN CALCIUM 80 MG: 80 TABLET, FILM COATED ORAL at 17:14

## 2022-10-10 RX ADMIN — AMIODARONE HYDROCHLORIDE 200 MG: 200 TABLET ORAL at 05:30

## 2022-10-10 RX ADMIN — FERROUS SULFATE TAB 325 MG (65 MG ELEMENTAL FE) 325 MG: 325 (65 FE) TAB at 08:20

## 2022-10-10 RX ADMIN — WARFARIN SODIUM 5 MG: 5 TABLET ORAL at 17:15

## 2022-10-10 RX ADMIN — POTASSIUM CHLORIDE 20 MEQ: 1500 TABLET, EXTENDED RELEASE ORAL at 08:20

## 2022-10-10 RX ADMIN — SENNOSIDES AND DOCUSATE SODIUM 1 TABLET: 8.6; 5 TABLET ORAL at 08:20

## 2022-10-10 RX ADMIN — CHLORHEXIDINE GLUCONATE 15 ML: 1.2 SOLUTION ORAL at 17:14

## 2022-10-10 RX ADMIN — OXYCODONE HYDROCHLORIDE AND ACETAMINOPHEN 500 MG: 500 TABLET ORAL at 08:20

## 2022-10-10 RX ADMIN — CHLORHEXIDINE GLUCONATE 15 ML: 1.2 SOLUTION ORAL at 08:20

## 2022-10-10 RX ADMIN — SENNOSIDES AND DOCUSATE SODIUM 1 TABLET: 8.6; 5 TABLET ORAL at 17:14

## 2022-10-10 RX ADMIN — ACETAMINOPHEN 975 MG: 325 TABLET, FILM COATED ORAL at 05:30

## 2022-10-10 RX ADMIN — ACETAMINOPHEN 975 MG: 325 TABLET, FILM COATED ORAL at 14:02

## 2022-10-10 RX ADMIN — AMIODARONE HYDROCHLORIDE 200 MG: 200 TABLET ORAL at 14:02

## 2022-10-10 RX ADMIN — PANTOPRAZOLE SODIUM 40 MG: 40 TABLET, DELAYED RELEASE ORAL at 05:30

## 2022-10-10 RX ADMIN — MUPIROCIN 1 APPLICATION: 20 OINTMENT TOPICAL at 08:20

## 2022-10-10 RX ADMIN — OXYCODONE HYDROCHLORIDE 5 MG: 5 TABLET ORAL at 17:34

## 2022-10-10 RX ADMIN — METOPROLOL TARTRATE 25 MG: 25 TABLET, FILM COATED ORAL at 08:20

## 2022-10-10 RX ADMIN — TORSEMIDE 20 MG: 20 TABLET ORAL at 08:20

## 2022-10-10 NOTE — DISCHARGE SUMMARY
Discharge Summary - Cardiothoracic Surgery   Enmanuel Schmid 74 y o  male MRN: [id number]  Unit/Bed#: [facility] 428-01 Encounter: [id number]    Admission Date: 10/4/2022     Discharge Date: 10/10/22    Admitting Diagnosis: Bicuspid aortic valve [Q23 1]  Nonrheumatic aortic valve stenosis [I35 0]  Nonrheumatic aortic valve insufficiency [I35 1]    Primary Discharge Diagnosis:   Aortic stenosis, Non-Rheumatic, Coronary artery disease  S/P aortic valve replacement and coronary artery bypass grafting;    Secondary Discharge Diagnosis:   HTN, HLD, AS/AI, Ascending aortic aneurysm, pre-DM (A1c 5 9%) and OA (hip)         Attending: MATEO Garcia  Consulting Physician(s):   Cardiology  Medical/Critical Care    Procedures Performed:   Procedure(s):  REPLACEMENT VALVE AORTIC (AVR) INSPIRIS RESILIA 25MM  CABG X 1, LIMA TO LAD     Hospital Course: The patient was seen in consultation prior to this admission for evaluation of Aortic stenosis, Non-Rheumatic, Coronary artery disease  Risks and benefits of aortic valve replacement and coronary artery bypass grafting were discussed in detail, and patient was agreeable  Routine preoperative evaluation was completed and informed consent was obtained prior to admission  10/4: Elective admission for AVR (#255mm Inspiris Resilia), CABG x1 (LIMA to LAD)  Intraop received 1U of cryo, FFP, and plts   Transferred to ICU supported with emerald at 40  No significant postoperative bleeding  Wean towards extubation  UA negative       10/5: Given 2 FFP/1 cryo/Amicar post-op for high CT o/p w/ improvement  Intermittently on emerald, now at 10, hold beta blocker, wean emerald as able, maintain a line  Delined   Lasix PRN for high UOP, discontinue fraser catheter  Discontinue insulin gtt, transition to SSIC   Transfer to stepdown  PM: Remains on emerald at 40  Given Lasix 40mg IV x1 for low UOP, start daily dosing   On stepdown in ICU, transfer to floor tonight      10/6: A fib w/ RVR QHS, given amio bolus x2 & gtt started, now rates in 120s, on david 40, start Lopressor 12 5mg BID, check INR & dose Coumadin tonight, wean david as able, maintain a line  Given additional Lasix 40mg IV x1, increase dosing to 40mg BID  On 2LNC, wean as tolerated  Transfer to floor when bed available      10/7: David off since 1730, continues on Amio gtt 0 5mg/min  Converted to NSR yesterday at 1530  RA, -773ml/24 hrs  Na 130, Hg 7 8 (8 8), INR 1 37  Hold Coumadin tonight due to Hg decrease, continue to monitor  Increase Metoprolol to 25mg BID  Increase Lasix to TID, start KCl  Start Iron and Vit C supplementation  D/C CTs  Add senna for increased bowel regimen  D/C Melissa  Downgrade to telemetry status      10/8: Abdomen distended, but soft, non-tender  Passing flatus  Give bisacodyl suppository today  NSR  RA  Hg stable, plt ct uptrending  Remains NSR with no further episodes of Afib  D/C Amio gtt, continue PO dosing  Coumadin 5mg tonight  Anticipate discharge home tomorrow      10/9: Had BM yesterday, abd distension improved  Went back into rapid Afib this morning  Rebolus Amio and restart Amio infusion  INR 1 51, Coumadin 7 5mg tonight  RA  -2 6L/24 hrs  Decrease Lasix to daily      10/10: Back in NSR; D/C IV amiodarone  INR 2; Coumadin, 5 mg today  D/C Arixtra  D/C central IV access today  D/C IV Lasix and start Torsemide, 20 mg PO QD  Post op Creatinine stable; Follow up labs prn  Fit for discharge to home         Condition at Discharge:   good     Discharge Physical Exam:    Please see the documented physical exam from this morning's progress note for details      Discharge Data:  Results from last 7 days   Lab Units 10/10/22  0539 10/08/22  0431 10/07/22  0429   WBC Thousand/uL 10 00 10 73* 12 52*   HEMOGLOBIN g/dL 8 5* 8 0* 7 8*   HEMATOCRIT % 25 3* 24 1* 23 6*   PLATELETS Thousands/uL 241 160 117*     Results from last 7 days   Lab Units 10/10/22  0539 10/08/22  0431 10/07/22  0429 10/04/22  1805 10/04/22  1400   POTASSIUM mmol/L 3  4* 3 3* 3 5   < >  --    CHLORIDE mmol/L 104 101 99   < >  --    CO2 mmol/L 27 27 26   < >  --    CO2, I-STAT mmol/L  --   --   --   --  25   BUN mg/dL 18 15 20   < >  --    CREATININE mg/dL 0 77 0 73 0 74   < >  --    GLUCOSE, ISTAT mg/dl  --   --   --   --  146*   CALCIUM mg/dL 8 2* 8 0* 8 0*   < >  --     < > = values in this interval not displayed  Results from last 7 days   Lab Units 10/10/22  0539 10/09/22  0427 10/08/22  0431 10/04/22  1615 10/04/22  1357   INR  2 04* 1 51* 1 54*   < > 1 47*   PTT seconds  --   --   --   --  54*    < > = values in this interval not displayed  Discharge instructions/Information to patient and family:   See after visit summary for information provided to patient and family  Enmanuel Schmid was educated on restrictions regarding driving and lifting, and techniques of proper incisional care  They were specifically counselled on signs and symptoms of an incisional infection, and advised to contact our service immediately should they develop fevers, sweats, chill, redness or drainage at the site of any incisions  Provisions for Follow-Up Care:  See after visit summary for information related to follow-up care and any pertinent home health orders  Disposition:  Home    Planned Readmission:   No    Discharge Medications:  See after visit summary for reconciled discharge medications provided to patient and family  Enmanuel Schmid was provided contact information and scheduled a follow up appointment with MATEO Morejon  Additionally, follow up appointments have been scheduled for their primary care physician and primary cardiologist   Contact information was provided  Enmanuel Schmid was counseled on the importance of avoiding tobacco products  As with all patients whom have undergone open heart surgery, tobacco cessation medication was contraindicated at the time of discharge       ACE/ARB was Contraindicated secondary to hypotension    Beta Blocker was Prescribed at discharge    Aspirin was Prescribed at discharge    Statin was Prescribed at discharge    Camila De Oliveira  is being discharged on anticoagulation therapy for treatment of post op AF  As they are new to Coumadin therapy, arrangements have been made the [place]  [facility] to monitor INR levels and provide dosing instructions  Their office was notified by [facility] and facsimile report which itemized the patient’s daily INR’s and correlating Coumadin doses during their hospitalization  Camila De Oliveira has been prescribed Coumadin, 5 mg tabs, with 60 tablets being dispensed  They have been advised to take 5 mg daily, unless otherwise directed  Follow up PT/INR will be ordered at the discretion of the Coumadin clinic  The patient was discharged on ongoing diuretic therapy with Torsemide 20 mg, PO QD and Potassium Chloride 20 mEq, PO QD  They were advised to continue these medications for 7 days, unless otherwise directed  Narcotic pain medication was prescribed in the form of Oxycodone  Prior to prescribing, their prescription profile was reviewed on the [facility] of health prescription drug monitoring program     The patient was informed that following their postoperative surgical evaluation, they will be referred to outpatient cardiac rehabilitation  They were counseled that this program is run by specialists who will help them safely strengthen their heart and prevent more heart disease  Cardiac rehabilitation will include exercise, relaxation, stress management, and heart-healthy nutrition  Caregivers will also check to make sure their medication regimen is working  During this admission, the patient was questioned on their use of tobacco, alcohol, and illicit/non-prescription drug use in the  previous 24 months  During this time frame they admit to using unhealthy alcohol use   As such they have been counseled on the importance of cessation and abstinence  I spent 30 minutes discharging the patient  This time was spent on the day of discharge  I had direct contact with the patient on the day of discharge  Additional documentation is required if more than 30 minutes were spent on discharge       SIGNATURE: Karel Nissen  DATE: October 10, 2022  TIME: 10:22 AM

## 2022-10-10 NOTE — PLAN OF CARE
Problem: PHYSICAL THERAPY ADULT  Goal: Performs mobility at highest level of function for planned discharge setting  See evaluation for individualized goals  Description: Treatment/Interventions: Functional transfer training, LE strengthening/ROM, Elevations, Therapeutic exercise, Endurance training, Equipment eval/education, Bed mobility, Gait training, Spoke to nursing, Spoke to case management, OT  Equipment Recommended: Malvin Mckinley (at this time)       See flowsheet documentation for full assessment, interventions and recommendations  Outcome: Adequate for Discharge  Note: Prognosis: Good  Problem List: Decreased strength, Decreased endurance, Impaired balance, Decreased mobility, Pain  Assessment: Pt seen for PT session today with a focus on gait, stairs, endurance, and HEP instruction  Pt has made good progress towards mobility goals, denying any dizziness and pain this session  Pt demonstrating improved LE strength and endurance, requiring less assistance with all mobility and ambulating increased distances  Pt continues to rely on RW; trialed ambulating without AD during which pt was steady but reports feeling more comfortable with RW at this time  Recommend use of RW for longer distances  Initiated stair training during which pt was able to navigate 20 stairs without any issues, taking occasional standing rest breaks  Reviewed seated and standing therex program with pt, written handout provided  Based on pt's progress, no further acute PT needs identified  PT signing off  Pt would benefit from continued ambulation with nursing and restorative staff as able  Continuing to recommend HHPT upon d/c   Barriers to Discharge: Inaccessible home environment     PT Discharge Recommendation: Home with home health rehabilitation    See flowsheet documentation for full assessment

## 2022-10-10 NOTE — PLAN OF CARE
Problem: Prexisting or High Potential for Compromised Skin Integrity  Goal: Skin integrity is maintained or improved  Description: INTERVENTIONS:  - Identify patients at risk for skin breakdown  - Assess and monitor skin integrity  - Assess and monitor nutrition and hydration status  - Monitor labs   - Assess for incontinence   - Turn and reposition patient  - Assist with mobility/ambulation  - Relieve pressure over bony prominences  - Avoid friction and shearing  - Provide appropriate hygiene as needed including keeping skin clean and dry  - Evaluate need for skin moisturizer/barrier cream  - Collaborate with interdisciplinary team   - Patient/family teaching  - Consider wound care consult   Outcome: Progressing     Problem: MOBILITY - ADULT  Goal: Maintain or return to baseline ADL function  Description: INTERVENTIONS:  -  Assess patient's ability to carry out ADLs; assess patient's baseline for ADL function and identify physical deficits which impact ability to perform ADLs (bathing, care of mouth/teeth, toileting, grooming, dressing, etc )  - Assess/evaluate cause of self-care deficits   - Assess range of motion  - Assess patient's mobility; develop plan if impaired  - Assess patient's need for assistive devices and provide as appropriate  - Encourage maximum independence but intervene and supervise when necessary  - Involve family in performance of ADLs  - Assess for home care needs following discharge   - Consider OT consult to assist with ADL evaluation and planning for discharge  - Provide patient education as appropriate  Outcome: Progressing       Problem: CARDIOVASCULAR - ADULT  Goal: Maintains optimal cardiac output and hemodynamic stability  Description: INTERVENTIONS:  - Monitor I/O, vital signs and rhythm  - Monitor for S/S and trends of decreased cardiac output  - Administer and titrate ordered vasoactive medications to optimize hemodynamic stability  - Assess quality of pulses, skin color and temperature  - Assess for signs of decreased coronary artery perfusion  - Instruct patient to report change in severity of symptoms  Outcome: Progressing  Goal: Absence of cardiac dysrhythmias or at baseline rhythm  Description: INTERVENTIONS:  - Continuous cardiac monitoring, vital signs, obtain 12 lead EKG if ordered  - Administer antiarrhythmic and heart rate control medications as ordered  - Monitor electrolytes and administer replacement therapy as ordered  Outcome: Progressing

## 2022-10-10 NOTE — CASE MANAGEMENT
Case Management Discharge Planning Note    Patient name Tashi San Elizario  Location [facility] 428/[facility] 195-33 MRN [id number]  : 1950 Date 10/10/2022       Current Admission Date: 10/4/2022  Current Admission Diagnosis:Bicuspid aortic valve      LOS (days): 6  Geometric Mean LOS (GMLOS) (days): 8 30  Days to GMLOS:2 2     OBJECTIVE:  Risk of Unplanned Readmission Score: 15 53   Current admission status: Inpatient   Primary Insurance: [employer] OF [place]    DISCHARGE DETAILS:    Discharge planning discussed with[de-identified] Patient  Freedom of Choice: Yes  CM contacted family/caregiver?: Yes  Were Treatment Team discharge recommendations reviewed with patient/caregiver?: Yes  Did patient/caregiver verbalize understanding of patient care needs?: Yes  Were patient/caregiver advised of the risks associated with not following Treatment Team discharge recommendations?: Yes    Contacts  Patient Contacts: Camryn Conroy (pt's wife)  Relationship to Patient[de-identified] Family  Contact Method: Phone  Phone Number: [phone]  Reason/Outcome: Emergency [address]         Is the patient interested in [facility] at discharge?: Yes  Via Suellen Styles requested[de-identified] Nursing, Physical [address] Name[de-identified] [address] [facility] Provider[de-identified] PCP  Home Health Services Needed[de-identified] Post-Op Care and Assessment, Evaluate Functional Status and Safety, Gait/ADL Training, Strengthening/Theraputic Exercises to Improve Function  Homebound Criteria Met[de-identified] Requires the Assistance of Another Person for Safe Ambulation or to Leave the Home  Supporting Clincal Findings[de-identified] Limited Endurance, Fatigues Easliy in Short Distances    Treatment Team Recommendation: Home with  [address]  Discharge Destination Plan[de-identified] Home with Paulian at Discharge : Family    Additional Comments: Pt is cleared for d/c by Cardiac Surgery JAMES Apodaca was notified of pt's d/c order   Pt is accepted for services by [facility] for his aftercare plan  The pt and his wife Maureen Rosario were both informed of d/c  Family will transport pt home later this day, pickup time is TBD  No IMM required  No chart copy required  CM to follow

## 2022-10-10 NOTE — PROGRESS NOTES
Cardiology Progress Note   Pavan Smith 74 y o  male MRN: [id number]  Unit/Bed#: [facility] 428-01 Encounter: [id number]    Hospital Course/Assessment  The patient is a 20-year-old male with past medical history of bicuspid aortic valve, AS, CAD status post CABG x1 to LAD and tissue AVR on 10/04  Postoperatively the patient was noted to have a brief episode of atrial fibrillation  He was asymptomatic during the episode  At that time he was started on amiodarone, metoprolol and warfarin by his primary team   Cardiology was later consulted for further management of the atrial fibrillation  Subjective: The patient was seen and examined at bedside  He had no acute events overnight  Today overall the patient is feeling well and had no acute complaints  He is back in normal sinus rhythm  He is POD#6  Plan  1  Atrial fibrillation  -patient is currently in normal sinus rhythm and no further episodes of atrial fibrillation were noted on telemetry  -DC amiodarone gtt  -continue p o  amiodarone until time of discharge  -continue anticoagulation with warfarin  -monitor INR  -continue metoprolol tartrate 25 mg q 12    2  S/p CABG and AVR  -POD #6  -continue aspirin and statin  -continue warfarin  -Rest of plan as per CT surgery      Francine Phoenix MD  - PGY-3 Internal Medicine Resident  - Tiger text enabled    ======================================================  Objective  VS: Blood pressure 95/62, pulse 70, temperature 98 3 °F (36 8 °C), resp  rate 16, height 5' 11" (1 803 m), weight 85 1 kg (187 lb 9 8 oz), SpO2 97 %  Gen: well appearing  Psych: AOx3  Skin: intact  Cardiac: S1, S2, regular rate, no S3 or S4 appreciated  No murmurs  +2 PT, radial pulses  No peripheral edema No carotid bruits  Resp: CTABL  No crackles  MSK: 5/5 strength throughout muscle groups  Neuro: CN grossly intact   Sensory to light touch, pain, proprioception intact BL LE, UE  LN: no cervical LAD  Rheum: no joint deformities in UE or LE  ======================================================  TREADMILL STRESS  Results for orders placed during the hospital encounter of 08/29/22    Stress test only, exercise    Interpretation Summary  •  Resting ECG: There is left ventricular hypertrophy without strain  There is a myocardial infarction located in the anteroseptal region  •  Blood pressure demonstrated a hypotensive response and heart rate demonstrated a normal response to stress  •  Stress ECG: Horizontal ST depression of 1 0 mm in the inferior leads is noted  The ECG was positive for ischemia  The stress ECG is consistent with ischemia after maximal exercise, without reproduction of symptoms  ----------------------------------------------------------------------------------------------  NUCLEAR STRESS TEST: No results found for this or any previous visit  No results found for this or any previous visit       --------------------------------------------------------------------------------  CATH:  No results found for this or any previous visit     --------------------------------------------------------------------------------  ECHO:   No results found for this or any previous visit  No results found for this or any previous visit     --------------------------------------------------------------------------------  HOLTER  No results found for this or any previous visit     --------------------------------------------------------------------------------  CAROTIDS  Results for orders placed during the hospital encounter of 09/23/22    VAS carotid complete study    Narrative  [facility] REPORT  CLINICAL:  Indications:  Patient presents for a general health evaluation secondary to future open heart  surgery  Patient is asymptomatic at this time  Operative History:  Patient denies any previous cardiovascular surgery  Risk Factors  The patient has history of HTN and Hyperlipidemia    Clinical  Right Pressure:  128/70 mm Hg, Left Pressure:  130/72 mm Hg  FINDINGS:    Right        Impression  PSV  EDV (cm/s)  Direction of Flow  Ratio  Dist  ICA                 55          21                      0 65  Mid  ICA                  51          17                      0 60  Prox  ICA    1 - 49%      67          22                      0 78  Dist CCA                  67          18  Mid CCA                   86          15                      1 25  Prox CCA                  69          15                      1 80  Ext Carotid               43           8                      0 51  Prox Vert                 25           7  Antegrade  Subclavian                92           0  Innominate                38           8    Left         Impression  PSV  EDV (cm/s)  Direction of Flow  Ratio  Dist  ICA                 53          23                      0 90  Mid  ICA                  44          17                      0 73  Prox  ICA    1 - 49%      44          13                      0 74  Dist CCA                  50          23  Mid CCA                   60          18                      0 88  Prox CCA                  68          19  Ext Carotid               58           7                      0 98  Prox Vert                 62          20  Antegrade  Subclavian                57           0        CONCLUSION:  Impression  RIGHT:  There is <50% stenosis noted in the internal carotid artery  Plaque is  heterogenous and irregular  Vertebral artery flow is antegrade  There is no significant subclavian artery  disease  LEFT:  There is <50% stenosis noted in the internal carotid artery  Plaque is  heterogenous and irregular  Vertebral artery flow is antegrade  There is no significant subclavian artery  disease  No previous study to compare  Recommend repeat duplex in 2 years to follow up  on plaque progression      Internal carotid artery stenosis determination by consensus criteria from:  Florencio Gonsalves et al  Carotid Artery Stenosis: Gray-Scale and Doppler US Diagnosis  - Society of Radiologists in [address] [place] [address] Drive, Radiology 2003;  [phone]      SIGNATURE:  Electronically Signed by: Roylene Spurling, MD, RPVI on 2022 12:13:49 AM       [unfilled]   =====================================================    Active Meds    Current Facility-Administered Medications:   •  acetaminophen (TYLENOL) rectal suppository 650 mg, 650 mg, Rectal, Q4H PRN, JOSS ReillyC  •  acetaminophen (TYLENOL) tablet 975 mg, 975 mg, Oral, Q8H, Leyla Foster PA-C, 975 mg at 10/10/22 0530  •  [] amiodarone (CORDARONE) 900 mg in dextrose 5 % 500 mL infusion, 1 mg/min, Intravenous, Continuous, Stopped at 10/09/22 1441 **FOLLOWED BY** amiodarone (CORDARONE) 900 mg in dextrose 5 % 500 mL infusion, 0 5 mg/min, Intravenous, Continuous, Gil Hobbs PA-C, Stopped at 10/10/22 1137  •  amiodarone tablet 200 mg, 200 mg, Oral, Q8H [facility], Leyla Foster PA-C, 200 mg at 10/10/22 0530  •  ascorbic acid (VITAMIN C) tablet 500 mg, 500 mg, Oral, Daily, Jeane Nova PA-C, 500 mg at 10/10/22 0820  •  aspirin chewable tablet 81 mg, 81 mg, Oral, Daily, Mary Bruce PA-C, 81 mg at 10/10/22 0820  •  atorvastatin (LIPITOR) tablet 80 mg, 80 mg, Oral, Daily With Natalie HEATHER Aguirre-C, 80 mg at 10/09/22 1802  •  bisacodyl (DULCOLAX) rectal suppository 10 mg, 10 mg, Rectal, Daily PRN, Leyla Foster PA-C, 10 mg at 10/08/22 1434  •  chlorhexidine (PERIDEX) 0 12 % oral rinse 15 mL, 15 mL, Swish & Spit, BID, Cyndie Corea PA-C, 15 mL at 10/10/22 0820  •  ferrous sulfate tablet 325 mg, 325 mg, Oral, Daily With Breakfast, Jeane Nova PA-C, 325 mg at 10/10/22 0820  •  insulin lispro (HumaLOG) 100 units/mL subcutaneous injection 1-5 Units, 1-5 Units, Subcutaneous, TID AC, 1 Units at 10/07/22 1157 **AND** Fingerstick Glucose (POCT), , , TID AC, Mary Bruce PA-C  •  insulin lispro (HumaLOG) 100 units/mL subcutaneous injection 1-5 Units, 1-5 Units, Subcutaneous, HS, Luly Byrd PA-C, 1 Units at 10/07/22 2227  •  metoprolol tartrate (LOPRESSOR) tablet 25 mg, 25 mg, Oral, Q12H [address], Jeane Nova PA-C, 25 mg at 10/10/22 0820  •  mupirocin (BACTROBAN) 2 % nasal ointment 1 application, 1 application, Nasal, O72Y [address], Walter Sanchez PA-C, 1 application at 46/13/32 0820  •  ondansetron (ZOFRAN) injection 4 mg, 4 mg, Intravenous, Q6H PRN, Rad Lehman PA-C  •  oxyCODONE (ROXICODONE) IR tablet 2 5 mg, 2 5 mg, Oral, Q4H PRN, Rad Lehman PA-C, 2 5 mg at 10/07/22 1157  •  oxyCODONE (ROXICODONE) IR tablet 5 mg, 5 mg, Oral, Q4H PRN, Rad Lehman PA-C, 5 mg at 10/08/22 2138  •  pantoprazole (PROTONIX) EC tablet 40 mg, 40 mg, Oral, Daily, Rad Lehman PA-C, 40 mg at 10/10/22 0530  •  polyethylene glycol (MIRALAX) packet 17 g, 17 g, Oral, Daily, Rad Lehman PA-C, 17 g at 10/09/22 0850  •  potassium chloride (K-DUR,KLOR-CON) CR tablet 20 mEq, 20 mEq, Oral, Daily, Leo Redmond PA-C, 20 mEq at 10/10/22 0820  •  senna-docusate sodium (SENOKOT S) 8 6-50 mg per tablet 1 tablet, 1 tablet, Oral, BID, Walter Sanchez PA-C, 1 tablet at 10/10/22 0820  •  torsemide (DEMADEX) tablet 20 mg, 20 mg, Oral, Daily, Leo Redmond PA-C, 20 mg at 10/10/22 0820  •  warfarin (COUMADIN) tablet 5 mg, 5 mg, Oral, Once (warfarin), Leo Redmond PA-C  •  zolpidem (AMBIEN) tablet 5 mg, 5 mg, Oral, HS PRN, Walter Sancehz, PA-C, 5 mg at 10/09/22 2211    Labs & Results  No results found for: CKTOTAL, CKMB, CKMBINDEX, TROPONINI  Lab Results   Component Value Date    GLUCOSE 146 (H) 10/04/2022    CALCIUM 8 2 (L) 10/10/2022    K 3 4 (L) 10/10/2022    CO2 27 10/10/2022     10/10/2022    BUN 18 10/10/2022    CREATININE 0 77 10/10/2022     Lab Results   Component Value Date    WBC 10 00 10/10/2022    HGB 8 5 (L) 10/10/2022    HCT 25 3 (L) 10/10/2022    MCV 95 10/10/2022     10/10/2022     Results from last 7 days   Lab Units 10/10/22  0539   INR  2 04*     No results found for: CHOL  Lab Results   Component Value Date    HDL 75 09/23/2022     Lab Results   Component Value Date    LDLCALC 148 (H) 09/23/2022     Lab Results   Component Value Date    TRIG 76 09/23/2022     Lab Results   Component Value Date    ALT 21 09/23/2022    AST 10 09/23/2022

## 2022-10-10 NOTE — PHYSICAL THERAPY NOTE
Physical Therapy Treatment Note    Patient's Name: Vee Watson  : 1950       10/10/22 0910   PT Last Visit   PT Visit Date 10/10/22   Note Type   Note Type Treatment   Pain Assessment   Pain Assessment Tool 0-10   Pain Score No Pain   Restrictions/Precautions   Weight Bearing Precautions Per Order No   Other Precautions Cardiac/sternal;Telemetry   General   Chart Reviewed Yes   Family/Caregiver Present No   Cognition   Overall Cognitive Status WFL   Attention Within functional limits   Orientation Level Oriented X4   Memory Within functional limits   Following Commands Follows all commands and directions without difficulty   Bed Mobility   Additional Comments pt seated OOB upon arrival   Transfers   Sit to Stand 5  Supervision   Additional items Armrests; Increased time required   Stand to Sit 5  Supervision   Additional items Armrests; Increased time required   Additional Comments transfers with RW   Ambulation/Elevation   Gait pattern Excessively slow; Step through pattern   Gait Assistance 5  Supervision   Additional items Assist x 1   Assistive Device Rolling walker   Distance 200ft with RW + an additional 20ft with no AD both at supervision level  Stair Management Assistance 5  Supervision   Additional items Assist x 1   Stair Management Technique Foreward;Nonreciprocal;One rail R   Number of Stairs 20   Ambulation/Elevation Additional Comments Pt used bilateral handrails for initial 7 steps and then used single handrail for following 13 stairs   Balance   Static Sitting Good   Dynamic Sitting Good   Static Standing Fair +   Dynamic Standing Fair   Ambulatory Fair   Activity Tolerance   Activity Tolerance Patient tolerated treatment well   Nurse Made Aware ok to see per RN   Exercises   Knee AROM Long Arc Quad Sitting;5 reps;AROM; Bilateral   Ankle Pumps Sitting;20 reps;AROM; Bilateral   Squat   (reviewed STS transfers for HEP)   Marching Sitting;10 reps;AROM; Bilateral   Assessment   Prognosis Good   Assessment Pt seen for PT session today with a focus on gait, stairs, endurance, and HEP instruction  Pt has made good progress towards mobility goals, denying any dizziness and pain this session  Pt demonstrating improved LE strength and endurance, requiring less assistance with all mobility and ambulating increased distances  Pt continues to rely on RW; trialed ambulating without AD during which pt was steady but reports feeling more comfortable with RW at this time  Recommend use of RW for longer distances  Initiated stair training during which pt was able to navigate 20 stairs without any issues, taking occasional standing rest breaks  Reviewed seated and standing therex program with pt, written handout provided  Based on pt's progress, no further acute PT needs identified  PT signing off  Pt would benefit from continued ambulation with nursing and restorative staff as able  Continuing to recommend HHPT upon d/c  Goals   Patient Goals to go home   Plan   Progress Improving as expected   PT Frequency Other (Comment)  (no further acute PT needs, PT signing off)   Recommendation   PT Discharge Recommendation Home with home health rehabilitation   [address]   Turning in Bed Without Bedrails 4   Lying on Back to Sitting on Edge of Flat Bed 4   Moving Bed to Chair 3   Standing Up From Chair 3   Walk in Room 3   Climb 3-5 Stairs 3   Basic Mobility Inpatient Raw Score 20   Basic Mobility Standardized Score 43 99   Highest Level Of Mobility   JH-HLM Goal 6: Walk 10 steps or more   JH-HLM Achieved 7: Walk 25 feet or more   Education   Education Provided Mobility training;Home exercise program  (written HEP provided with seated and standing therex)   Patient Demonstrates acceptance/verbal understanding   End of Consult   Patient Position at End of Consult Bedside chair; All needs within reach       Sandoval Mistry, PT, DPT

## 2022-10-10 NOTE — PROGRESS NOTES
Progress Note - Cardiothoracic Surgery   Denice Castle 74 y o  male MRN: [id number]  Unit/Bed#: [facility] 428-01 Encounter: [id number]    Aortic regurgitation, Aortic stenosis, Non-Rheumatic, Coronary artery disease, BAV  S/P aortic valve replacement and coronary artery bypass grafting x 1; POD # 6      24 Hour Events:  Converted back to NSR, without AF recurrence  + BM; Nausea resolved       Medications:   Scheduled Meds:  Current Facility-Administered Medications   Medication Dose Route Frequency Provider Last Rate   • acetaminophen  650 mg Rectal Q4H PRN [place] Pour, JAMES     • acetaminophen  975 mg Oral Q8H [place], JAMES     • amiodarone  0 5 mg/min Intravenous Continuous Dominga Borja PA-C 0 5 mg/min (10/09/22 1600)   • amiodarone  200 mg Oral [facility] [place] Pour, JAMES     • ascorbic acid  500 mg Oral Daily Dominga Borja PA-C     • aspirin  81 mg Oral Daily Buck Morocho PA-C     • atorvastatin  80 mg Oral Daily With Cuca JAMES Michael     • bisacodyl  10 mg Rectal Daily PRN [place] Pour, JAMES     • chlorhexidine  15 mL Swish & Spit BID [place] Pour, JAMES     • ferrous sulfate  325 mg Oral Daily With Breakfast Dominga Borja PA-C     • fondaparinux  2 5 mg Subcutaneous Daily [place] Pour, JAMES     • furosemide  40 mg Intravenous BID (diuretic) Dominga Borja PA-C     • insulin lispro  1-5 Units Subcutaneous TID AC Buck Morocho PA-C     • insulin lispro  1-5 Units Subcutaneous HS Buck Morocho PA-C     • metoprolol tartrate  25 mg Oral Q12H [facility] Dominga Borja PA-C     • mupirocin  1 application Nasal J44L [facility] Jeane Weldon PA-C     • ondansetron  4 mg Intravenous Q6H PRN [place], JAMES     • oxyCODONE  2 5 mg Oral Q4H PRN [place], JAMES     • oxyCODONE  5 mg Oral Q4H PRN [place], JAMES     • pantoprazole  40 mg Oral Daily [place] Pour, JAMES     • polyethylene glycol  17 g Oral Daily [place] Pour, JAMES     • potassium chloride  20 mEq Oral BID [facility] Tressa Luna PA-C     • senna-docusate sodium  1 tablet Oral BID Walter Sanchez PA-C     • zolpidem  5 mg Oral HS PRN Walter Sanchez PA-C       Continuous Infusions:amiodarone, 0 5 mg/min, Last Rate: 0 5 mg/min (10/09/22 1600)      PRN Meds: •  acetaminophen  •  bisacodyl  •  ondansetron  •  oxyCODONE  •  oxyCODONE  •  zolpidem    Vitals:   Vitals:    10/09/22 2330 10/10/22 0300 10/10/22 0600 10/10/22 0659   BP: 120/80 118/72  118/75   BP Location: Right arm      Pulse: 80 74  77   Resp: 16 16     Temp: 97 8 °F (36 6 °C) 98 °F (36 7 °C)  98 2 °F (36 8 °C)   TempSrc: Oral      SpO2:  99%  96%   Weight:   85 1 kg (187 lb 9 8 oz)    Height:           Telemetry: NSR; 75    Respiratory:   SpO2: SpO2: 96 %; Room Air    Intake/Output: -2642 ml/24 hrs    Intake/Output Summary (Last 24 hours) at 10/10/2022 0739  Last data filed at 10/10/2022 0501  Gross per 24 hour   Intake 735 67 ml   Output 750 ml   Net -14 33 ml        Weights:   Weight (last 2 days)     Date/Time Weight    10/10/22 0600 85 1 (187 61)    10/09/22 0600 85 3 (188 05)    10/08/22 0638 88 5 (195 11)    10/08/22 0600 88 5 (195 11)            Results:   Results from last 7 days   Lab Units 10/10/22  0539 10/08/22  0431 10/07/22  0429   WBC Thousand/uL 10 00 10 73* 12 52*   HEMOGLOBIN g/dL 8 5* 8 0* 7 8*   HEMATOCRIT % 25 3* 24 1* 23 6*   PLATELETS Thousands/uL 241 160 117*     Results from last 7 days   Lab Units 10/10/22  0539 10/08/22  0431 10/07/22  0429 10/04/22  1805 10/04/22  1400   SODIUM mmol/L 136 134* 130*   < >  --    POTASSIUM mmol/L 3 4* 3 3* 3 5   < >  --    CHLORIDE mmol/L 104 101 99   < >  --    CO2 mmol/L 27 27 26   < >  --    CO2, I-STAT mmol/L  --   --   --   --  25   BUN mg/dL 18 15 20   < >  --    CREATININE mg/dL 0 77 0 73 0 74   < >  --    GLUCOSE, ISTAT mg/dl  --   --   --   --  146*   CALCIUM mg/dL 8 2* 8 0* 8 0*   < >  --     < > = values in this interval not displayed       Results from last 7 days   Lab Units 10/10/22  0539 10/09/22  0427 10/08/22  0431 10/04/22  1615 10/04/22  1357   INR  2 04* 1 51* 1 54*   < > 1 47*   PTT seconds  --   --   --   --  54*    < > = values in this interval not displayed  Coumadin:    10/10 - 5  10/9 - 7 5  10/8 - 5  10/7 - 0  10/6 - 5      Point of care glucose: 109-114    Studies:  No studies past 24 hrs        Invasive Lines/Tubes:  Invasive Devices  Report    Central Venous Catheter Line  Duration           CVC Central Lines 10/04/22 Triple 5 days              Physical Exam:    HEENT/NECK:  Normocephalic  Atraumatic  No jugular venous distention  Cardiac: Regular rate and rhythm  Pulmonary:  Breath sounds clear bilaterally  Abdomen:  Non-tender, Non-distended and Normal bowel sounds  Incisions: Sternum is stable  Incision is clean, dry, and intact  Extremities: Extremities warm/dry  Neuro: Alert and oriented X 3 and Sensation is grossly intact  Skin: Warm/Dry, without rashes or lesions  Assessment:  Principal Problem:    Bicuspid aortic valve  Active Problems:    Nonrheumatic aortic valve stenosis    Ascending aortic aneurysm    Nonrheumatic aortic valve insufficiency    Pre-diabetes    Hyperlipidemia    S/P CABG x 1    Coronary artery disease    S/P AVR (aortic valve replacement)    Anticoagulated on Coumadin    Postoperative atrial fibrillation (HCC)    Hyponatremia    Postoperative anemia due to acute blood loss    Thrombocytopenia (HCC)    Leukocytosis       Aortic regurgitation, Aortic stenosis, Non-Rheumatic, Coronary artery disease, BAV  S/P aortic valve replacement and coronary artery bypass grafting x 1; POD # 6    Plan:    1  Cardiac:   NSR; HR/BP well-controlled  Continue Lopressor, 25mg PO BID  Continue ASA and Statin therapy    PAF   Back in NSR   D/C IV amiodarone   INR 2   Coumadin, 5 mg today   D/C Arixtra    Epicardial pacing wires out  D/C central IV access today       2   Pulmonary:   Good Room air oxygen saturation; Continue incentive spirometry/Coughing/Deep breathing exercises  CTs removed    3  Renal:   Intake/Output net: even, with unmeasured output  D/C IV Lasix   Start Torsemide, 20 mg PO QD   Potassium Chloride 20 mEq PO QD  Post op Creatinine stable; Follow up labs prn    4  Neuro:  Neurologically intact; No active issues  Insomnia - give ambien qhs PRN  Incisional pain well-controlled  Continue Tylenol, 975 mg PO q 8, standing dose  Continue Oxycodone, 2 5 to 5 mg PO q 4 hours prn pain    5  GI:  Tolerating TLC 2 3 gm sodium diet  Maintain 1800 mL daily fluid restriction   Continue stool softeners and give suppository today  Continue GI prophylaxis    6  Endo:   Glucose well-controlled with sliding scale coverage    7    Hematology:    Post-operative acute blood loss anemia; Hemoglobin 8 0; trend prn,       8     Disposition:      Home today  '  VTE Pharmacologic Prophylaxis: Coumaind  VTE Mechanical Prophylaxis: sequential compression device    Collaborative rounds completed with supervising physician  Plan of care discussed with bedside nurse    SIGNATURE: Vilma Avelar  DATE: October 10, 2022  TIME: 7:39 AM

## 2022-10-11 ENCOUNTER — HOME CARE VISIT (OUTPATIENT)
Dept: HOME HEALTH SERVICES | Facility: HOME HEALTHCARE | Age: 72
End: 2022-10-11

## 2022-10-11 ENCOUNTER — ANTICOAG VISIT (OUTPATIENT)
Dept: CARDIOLOGY CLINIC | Facility: CLINIC | Age: 72
End: 2022-10-11

## 2022-10-11 DIAGNOSIS — I48.91 POSTOPERATIVE ATRIAL FIBRILLATION (HCC): Primary | ICD-10-CM

## 2022-10-11 DIAGNOSIS — I97.89 POSTOPERATIVE ATRIAL FIBRILLATION (HCC): Primary | ICD-10-CM

## 2022-10-12 ENCOUNTER — HOME CARE VISIT (OUTPATIENT)
Dept: HOME HEALTH SERVICES | Facility: HOME HEALTHCARE | Age: 72
End: 2022-10-12
Payer: COMMERCIAL

## 2022-10-12 VITALS — DIASTOLIC BLOOD PRESSURE: 60 MMHG | SYSTOLIC BLOOD PRESSURE: 106 MMHG

## 2022-10-12 VITALS
OXYGEN SATURATION: 92 % | SYSTOLIC BLOOD PRESSURE: 96 MMHG | RESPIRATION RATE: 18 BRPM | HEART RATE: 80 BPM | DIASTOLIC BLOOD PRESSURE: 68 MMHG | TEMPERATURE: 96.5 F

## 2022-10-12 PROCEDURE — G0299 HHS/HOSPICE OF RN EA 15 MIN: HCPCS

## 2022-10-12 PROCEDURE — 400013 VN SOC

## 2022-10-12 PROCEDURE — G0151 HHCP-SERV OF PT,EA 15 MIN: HCPCS

## 2022-10-12 NOTE — UTILIZATION REVIEW
Notification of Discharge   This is a Notification of Discharge from our facility [address]  Please be advised that this patient has been discharge from our facility  Below you will find the admission and discharge date and time including the patient’s disposition  UTILIZATION REVIEW CONTACT:  [employer]  Utilization   Network Utilization Review Department  Phone: [phone] x carefully listen to the prompts  All voicemails are confidential   Email: [email or web address]     PHYSICIAN ADVISORY SERVICES:  FOR EKAV-OH-JOQS REVIEW - MEDICAL NECESSITY DENIAL  Phone: [phone]  Fax: [phone]  Email: [email or web address]     PRESENTATION DATE: 10/4/2022  5:05 AM  OBERVATION ADMISSION DATE:   INPATIENT ADMISSION DATE: 10/4/22  7:12 AM   DISCHARGE DATE: 10/10/2022  7:03 PM  DISPOSITION: Home with [place] with [address] INFORMATION:  Send all requests for admission clinical reviews, approved or denied determinations and any other requests to dedicated fax number below belonging to the campus where the patient is receiving treatment   List of dedicated fax numbers:  [address] [facility] [address] DENIALS (Administrative/Medical Necessity) [phone]   [address]  (Maternity/NICU/Pediatrics) [phone]   [facility] [phone]   [place] [address] [phone]   [address] [phone]   [address] [facility] [phone]   [address] [facility] [phone]   [address] [place], [place] [address] [place] [facility] [phone]   [facility]  [phone]   [address] [facility] [phone]   [facility] [address] [facility] [phone]

## 2022-10-12 NOTE — CASE COMMUNICATION
[facility] has admitted your patient to [address] [place] [address] service with the following disciplines:      SN and PT  This report is informational only, no responses is needed  Primary focus of home health care: Cardiac  Patient stated goals of care: get back to normal living and work  Anticipated visit pattern: 2w1 3w1 2w1 and next visit date: 10/13/22 PT INR SP CABG and AVR 10/4  Significant clinical findings: Patient reports dizziness with  standing and walking that is improving since yesterday  BP is soft BP is soft 100/70 on left and 96/68 on right HR 80  Thank you for allowing us to participate in the care of your patient        [employer] Marsha Singleton RN

## 2022-10-13 ENCOUNTER — HOME CARE VISIT (OUTPATIENT)
Dept: HOME HEALTH SERVICES | Facility: HOME HEALTHCARE | Age: 72
End: 2022-10-13
Payer: COMMERCIAL

## 2022-10-13 ENCOUNTER — ANTICOAG VISIT (OUTPATIENT)
Dept: CARDIOLOGY CLINIC | Facility: CLINIC | Age: 72
End: 2022-10-13

## 2022-10-13 VITALS
OXYGEN SATURATION: 92 % | SYSTOLIC BLOOD PRESSURE: 110 MMHG | HEART RATE: 83 BPM | DIASTOLIC BLOOD PRESSURE: 68 MMHG | TEMPERATURE: 96.3 F | RESPIRATION RATE: 16 BRPM

## 2022-10-13 DIAGNOSIS — I48.91 POSTOPERATIVE ATRIAL FIBRILLATION (HCC): Primary | ICD-10-CM

## 2022-10-13 DIAGNOSIS — I97.89 POSTOPERATIVE ATRIAL FIBRILLATION (HCC): Primary | ICD-10-CM

## 2022-10-13 LAB
INR PPP: 4.3 (ref 0.84–1.19)
SL AMB/VNA POCT INR: 4.3
SL AMB/VNA POCT INR: 52
SL AMB/VNA PROTIME: 4.3 SECONDS
SL AMB/VNA PROTIME: 52 SECONDS

## 2022-10-13 PROCEDURE — G0299 HHS/HOSPICE OF RN EA 15 MIN: HCPCS

## 2022-10-13 PROCEDURE — G0180 MD CERTIFICATION HHA PATIENT: HCPCS | Performed by: PHYSICIAN ASSISTANT

## 2022-10-13 PROCEDURE — 85610 PROTHROMBIN TIME: CPT | Performed by: THORACIC SURGERY (CARDIOTHORACIC VASCULAR SURGERY)

## 2022-10-13 NOTE — CASE COMMUNICATION
TC to [facility] coumadin clinic notified Luiza INR 4 3 PT 52 00  Hold tonight, tomorrow and Sun 2 5mg Sat 5mg Recheck INR Monday results to Dr Kourtney Garduno

## 2022-10-14 ENCOUNTER — TELEPHONE (OUTPATIENT)
Dept: CARDIAC SURGERY | Facility: CLINIC | Age: 72
End: 2022-10-14

## 2022-10-14 DIAGNOSIS — Z95.2 S/P AVR (AORTIC VALVE REPLACEMENT): Primary | ICD-10-CM

## 2022-10-14 DIAGNOSIS — Z95.1 S/P CABG X 1: ICD-10-CM

## 2022-10-14 NOTE — TELEPHONE ENCOUNTER
Post-op call  S/p AVR, CABGx1 (LIMA to LAD) 10/4/2022   -C/o feeling tired w/ decrease stamina  C/o decrease appetite (having BMs, taking amio taper, no N/V)  -Weighting self daily (weight 185lbs today, weight 187 6lbs at discharge, day 4/7 for diuretics, no LE edema)  -Showering daily, cleaning incisions daily otherwise  Otherwise no incisional complaints or concerns   -Ambulating well, at least 4 times/day at home   -Abiding by dietary/fluid restrictions  -INR last 4 30, managed by Coumadin clinic, no SOB or lightlessness   -No pain or sleeping concerns  -Appts, medications & basic post-op instructions reviewed

## 2022-10-17 ENCOUNTER — HOME CARE VISIT (OUTPATIENT)
Dept: HOME HEALTH SERVICES | Facility: HOME HEALTHCARE | Age: 72
End: 2022-10-17
Payer: COMMERCIAL

## 2022-10-17 ENCOUNTER — LAB REQUISITION (OUTPATIENT)
Dept: LAB | Facility: HOSPITAL | Age: 72
End: 2022-10-17
Payer: COMMERCIAL

## 2022-10-17 VITALS — HEART RATE: 72 BPM | DIASTOLIC BLOOD PRESSURE: 80 MMHG | OXYGEN SATURATION: 97 % | SYSTOLIC BLOOD PRESSURE: 120 MMHG

## 2022-10-17 VITALS
OXYGEN SATURATION: 98 % | DIASTOLIC BLOOD PRESSURE: 80 MMHG | HEART RATE: 72 BPM | SYSTOLIC BLOOD PRESSURE: 120 MMHG | RESPIRATION RATE: 16 BRPM | TEMPERATURE: 97.8 F

## 2022-10-17 DIAGNOSIS — Z79.01 LONG TERM (CURRENT) USE OF ANTICOAGULANTS: ICD-10-CM

## 2022-10-17 PROCEDURE — G0151 HHCP-SERV OF PT,EA 15 MIN: HCPCS

## 2022-10-17 PROCEDURE — G0299 HHS/HOSPICE OF RN EA 15 MIN: HCPCS

## 2022-10-17 PROCEDURE — 85610 PROTHROMBIN TIME: CPT | Performed by: STUDENT IN AN ORGANIZED HEALTH CARE EDUCATION/TRAINING PROGRAM

## 2022-10-18 ENCOUNTER — TELEPHONE (OUTPATIENT)
Dept: LAB | Facility: HOSPITAL | Age: 72
End: 2022-10-18

## 2022-10-18 ENCOUNTER — ANTICOAG VISIT (OUTPATIENT)
Dept: CARDIOLOGY CLINIC | Facility: CLINIC | Age: 72
End: 2022-10-18

## 2022-10-18 DIAGNOSIS — I97.89 POSTOPERATIVE ATRIAL FIBRILLATION (HCC): Primary | ICD-10-CM

## 2022-10-18 DIAGNOSIS — I48.91 POSTOPERATIVE ATRIAL FIBRILLATION (HCC): Primary | ICD-10-CM

## 2022-10-18 LAB
INR PPP: 2.23 (ref 0.84–1.19)
PROTHROMBIN TIME: 24.9 SECONDS (ref 11.6–14.5)

## 2022-10-18 NOTE — PROGRESS NOTES
Cardiology Follow Up    [facility]  1950  [id number]  [address]  [phone]    1  S/P AVR (aortic valve replacement)  metoprolol tartrate (LOPRESSOR) 25 mg tablet   2  S/P CABG x 1  metoprolol tartrate (LOPRESSOR) 25 mg tablet   3  Atrial fibrillation, unspecified type (HCC)  POCT ECG   4  RUQ pain  metoprolol tartrate (LOPRESSOR) 25 mg tablet   5  Hypertension, unspecified type     6  Hyperlipidemia, unspecified hyperlipidemia type         Interval History:    [facility] was admitted to [facility] on 10/04 - 10/10/22 with bicuspid aortic valve  On 10/04/22 Mr Israel was electively admitted and underwent AVR #25mm Inspiris Reslia, CABG x 1 DALE to LAD by Dr Camacho Rajan  He did not experience sig post operative bleeding  On 10/06 atrial fibrillation  He was treated with  Metoprolol, coumadin started   10/07/22 NSR,  10/09/22 atrial fibrillation  10/10/22 NSR, discharged home on Coumadin   [facility] presents to our office for a recent Hospitalization follow up visit  He is accompanied by his wife  Lynn Mckeon chief complaint is Right upper quadrant pain with taking a deep breath and with sleeping  Pain started 2 days ago  He admits to a poor appetite; food does not taste good    Lynn Mckeon admits to dyspnea and fatigue walking up a flight of stairs, relieved with rest       Medical History   Bicuspid AV, severe AS  Primary Cardiologist Dr Rashmi Chairez  Hypertension  Hyperlipidemia 9/23/22 , TG 76, HDL 75,    AAA  Pre DM HgbA1C 5 9  Patient Active Problem List   Diagnosis   • Nonrheumatic aortic valve stenosis   • Ascending aortic aneurysm   • Nonrheumatic aortic valve insufficiency   • Pre-diabetes   • Hyperlipidemia   • Bicuspid aortic valve   • S/P CABG x 1   • Coronary artery disease   • S/P AVR (aortic valve replacement)   • Anticoagulated on Coumadin   • Postoperative atrial fibrillation (HCC)   • Hyponatremia   • Postoperative anemia due to acute blood loss   • Thrombocytopenia (HCC)   • Leukocytosis     Past Medical History:   Diagnosis Date   • Aortic stenosis    • Ascending aortic aneurysm    • Hyperlipidemia    • Hypertension    • Nonrheumatic aortic valve insufficiency    • Osteoarthritis    • Prostatitis      Social History     Socioeconomic History   • Marital status: /Civil Union     Spouse name: Not on file   • Number of children: Not on file   • Years of education: Not on file   • Highest education level: Not on file   Occupational History   • Not on file   Tobacco Use   • Smoking status: Never Smoker   • Smokeless tobacco: Never Used   Vaping Use   • Vaping Use: Never used   Substance and Sexual Activity   • Alcohol use: Not Currently     Alcohol/week: 28 0 standard drinks     Types: 14 Glasses of wine, 14 Shots of liquor per week     Comment: 2 small glasses of wine & 2 shots whiskey daily   • Drug use: Not Currently   • Sexual activity: Yes     Partners: Female   Other Topics Concern   • Not on file   Social History Narrative   • Not on file     Social Determinants of Health     Financial Resource Strain: Not on file   Food Insecurity: No Food Insecurity   • Worried About Running Out of Food in the Last Year: Never true   • Ran Out of Food in the Last Year: Never true   Transportation Needs: No Transportation Needs   • Lack of Transportation (Medical): No   • Lack of Transportation (Non-Medical): No   Physical Activity: Not on file   Stress: Not on file   Social Connections: Not on file   Intimate Partner Violence: Not on file   Housing Stability: Low Risk    • Unable to Pay for Housing in the Last Year: No   • Number of Places Lived in the Last Year: 1   • Unstable Housing in the Last Year: No      No family history on file    Past Surgical History:   Procedure Laterality Date   • CARDIAC CATHETERIZATION N/A 9/30/2022    Procedure: Cardiac [facility]; Surgeon: Caren Betancur MD;  Location: [facility] CARDIAC CATH [facility]; Service: Cardiology   • COLONOSCOPY  08/09/2017   • CORONARY ARTERY BYPASS GRAFT N/A 10/4/2022    Procedure: CABG X 1, LIMA TO LAD;  Surgeon: Michelle Tenorio MD;  Location: [facility] MAIN OR;  Service: Cardiac Surgery   • EYE MUSCLE SURGERY      stigmatism correction age 13   • INGUINAL HERNIA REPAIR Left    • HI RPLCMT AORTIC VALVE OPN W/STENTLESS TISSUE VALVE N/A 10/4/2022    Procedure: REPLACEMENT VALVE AORTIC (AVR) INSPIRIS RESILIA 25MM; Surgeon: Michelle Tenorio MD;  Location: [facility] MAIN OR;  Service: Cardiac Surgery   • SKIN CANCER EXCISION     • TONSILECTOMY AND ADNOIDECTOMY         Current Outpatient Medications:   •  acetaminophen (TYLENOL) 650 mg CR tablet, Take 1 tablet (650 mg total) by mouth every 8 (eight) hours as needed for mild pain, Disp: 30 tablet, Rfl: 0  •  amiodarone 200 mg tablet, Take twice daily for 14 days, then decrease to once daily for an additional 14 days  , Disp: 42 tablet, Rfl: 0  •  ascorbic acid (VITAMIN C) 500 MG tablet, Take 1 tablet (500 mg total) by mouth daily Do not start before October 11, 2022 , Disp: 90 tablet, Rfl: 0  •  aspirin (ECOTRIN LOW STRENGTH) 81 mg EC tablet, Take 81 mg by mouth daily, Disp: , Rfl:   •  atorvastatin (LIPITOR) 80 mg tablet, Take 1 tablet (80 mg total) by mouth daily with dinner, Disp: 30 tablet, Rfl: 2  •  ferrous sulfate 325 (65 Fe) mg tablet, Take 1 tablet (325 mg total) by mouth daily with breakfast Do not start before October 11, 2022 , Disp: 90 tablet, Rfl: 0  •  metoprolol tartrate (LOPRESSOR) 25 mg tablet, Take 1 tablet (25 mg total) by mouth every 12 (twelve) hours, Disp: 60 tablet, Rfl: 2  •  omeprazole (PriLOSEC) 20 mg delayed release capsule, Take 1 capsule (20 mg total) by mouth daily, Disp: 30 capsule, Rfl: 0  •  warfarin (COUMADIN) 5 mg tablet, Take 1 tablet (5 mg total) by mouth daily Unless otherwise directed , Disp: 60 tablet, Rfl: 2  Allergies   Allergen Reactions   • Pollen Extract Nasal Congestion       Labs:  Lab Requisition on 10/19/2022   Component Date Value   • Protime 10/19/2022 26 6 (A)   • INR 10/19/2022 2 42 (A)   Lab Requisition on 10/17/2022   Component Date Value   • Protime 10/17/2022 24 9 (A)   • INR 10/17/2022 2 23 (A)   Anticoag visit on 10/13/2022   Component Date Value   • INR 10/13/2022 4 30 (A)   Home Care Visit on 10/13/2022   Component Date Value   • PROTIME 10/13/2022 4 3    • POCT INR 10/13/2022 52 00    • PROTIME 10/13/2022 52 0    • POCT INR 10/13/2022 4 30    No results displayed because visit has over 200 results        Admission on 09/30/2022, Discharged on 09/30/2022   Component Date Value   • Sodium 09/30/2022 137    • Potassium 09/30/2022 4 3    • Chloride 09/30/2022 108    • CO2 09/30/2022 24    • ANION GAP 09/30/2022 5    • BUN 09/30/2022 14    • Creatinine 09/30/2022 0 88    • Glucose 09/30/2022 111    • Glucose, Fasting 09/30/2022 111 (A)   • Calcium 09/30/2022 8 4    • eGFR 09/30/2022 84    • Ventricular Rate 09/30/2022 83    • Atrial Rate 09/30/2022 83    • FL Interval 09/30/2022 224    • QRSD Interval 09/30/2022 112    • QT Interval 09/30/2022 398    • QTC Interval 09/30/2022 467    • P Axis 09/30/2022 69    • QRS Axis 09/30/2022 -24    • T Wave Axis 09/30/2022 81    Lab Requisition on 09/23/2022   Component Date Value   • ABO Grouping 09/23/2022 A    • Rh Factor 09/23/2022 Positive    • Antibody Screen 09/23/2022 Negative    • Specimen Expiration Date 09/23/2022 [id number]    Office Visit on 09/23/2022   Component Date Value   • Ventricular Rate 09/23/2022 86    • Atrial Rate 09/23/2022 86    • FL Interval 09/23/2022 168    • QRSD Interval 09/23/2022 92    • QT Interval 09/23/2022 390    • QTC Interval 09/23/2022 466    • P Axis 09/23/2022 37    • QRS Axis 09/23/2022 -17    • T Wave [place] 09/23/2022 86    Appointment on 09/23/2022   Component Date Value   • PSA 09/23/2022 1 4    • Protime 09/23/2022 13 1    • INR 09/23/2022 0 97 • WBC 09/23/2022 7 43    • RBC 09/23/2022 4 90    • Hemoglobin 09/23/2022 15 6    • Hematocrit 09/23/2022 46 8    • MCV 09/23/2022 96    • MCH 09/23/2022 31 8    • MCHC 09/23/2022 33 3    • RDW 09/23/2022 12 5    • Platelets 28/89/7341 202    • MPV 09/23/2022 10 9    • MRSA Culture Only 09/23/2022 No Methicillin Resistant Staphlyococcus aureus (MRSA) isolated    • Cholesterol 09/23/2022 238 (A)   • Triglycerides 09/23/2022 76    • HDL, Direct 09/23/2022 75    • LDL Calculated 09/23/2022 148 (A)   • Non-HDL-Chol (CHOL-HDL) 09/23/2022 163    • Hemoglobin A1C 09/23/2022 5 7 (A)   • EAG 09/23/2022 117    • Sodium 09/23/2022 135    • Potassium 09/23/2022 4 3    • Chloride 09/23/2022 103    • CO2 09/23/2022 28    • ANION GAP 09/23/2022 4    • BUN 09/23/2022 15    • Creatinine 09/23/2022 0 79    • Glucose, Fasting 09/23/2022 110 (A)   • Calcium 09/23/2022 9 7    • AST 09/23/2022 10    • ALT 09/23/2022 21    • Alkaline Phosphatase 09/23/2022 39 (A)   • Total Protein 09/23/2022 7 7    • Albumin 09/23/2022 3 7    • Total Bilirubin 09/23/2022 0 57    • eGFR 09/23/2022 88    • Vit D, 25-Hydroxy 09/23/2022 33 4    Hospital Outpatient Visit on 08/29/2022   Component Date Value   • Baseline HR 08/29/2022 92    • Baseline BP 08/29/2022 134/78    • O2 sat rest 08/29/2022 99    • Stress peak HR 08/29/2022 124    • Post peak BP 08/29/2022 94    • Rate Pressure Product 08/29/2022 11,656 0    • O2 sat peak 08/29/2022 99    • Recovery HR 08/29/2022 105    • Recovery BP 08/29/2022 110/76    • O2 sat recovery 08/29/2022 99    • Max HR 08/29/2022 130    • Max HR Percent 08/29/2022 89    • Exercise duration (min) 08/29/2022 12    • Exercise duration (sec) 08/29/2022 0    • Estimated workload 08/29/2022 7 0    • Angina Index 08/29/2022 0    • Stress Stage Reached 08/29/2022 4 0    • Guerra Treadmill Score 08/29/2022 7    • ST Depression (mm) 08/29/2022 1 0    • Protocol Name 08/29/2022 MODBRUCE    • Time In Exercise Phase 08/29/2022 00:12:01 • MAX  SYSTOLIC BP 32/86/9524 444    • Max Diastolic Bp 66/15/7064 78    • Max Heart Rate 08/29/2022 130    • Max Predicted Heart Rate 08/29/2022 146    • Reason for Termination 08/29/2022 Target Heart Rate Achieved    • Test Indication 08/29/2022 Screening for CAD    • Target Hr Formular 08/29/2022 (220 - Age)*100%    • Chest Pain Statement 08/29/2022 none    There may be more visits with results that are not included  Imaging: XR chest portable    Result Date: 10/5/2022  Narrative: CHEST INDICATION:   CABG and AVR  COMPARISON:  CXR 10/4/2022 and chest CT 9/23/2022  EXAM PERFORMED/VIEWS:  XR CHEST PORTABLE FINDINGS:  ET tube, NG tube, pulmonary artery catheter removed  Right jugular catheter in lower SVC  Normal heart size  CABG and AVR  Sternal wires well aligned  Two mediastinal drains  Temporary epicardial pacemaker wires  Left chest tube  Mild bibasilar atelectasis  No effusion or pneumothorax  Osseous structures appear within normal limits for patient age  Impression: Expected appearance after cardiac surgery with mild bibasilar atelectasis  Workstation performed: [id number]     CTA chest wo w contrast    Result Date: 9/26/2022  Narrative: CT ANGIOGRAM OF THE CHEST, WITH AND WITHOUT IV CONTRAST INDICATION:   I71 2: Thoracic aortic aneurysm, without rupture I35 0: Nonrheumatic aortic (valve) stenosis  COMPARISON: Compared with 11/12/2020 TECHNIQUE:  CT angiogram examination of the chest was performed according to standard protocol  Contrast as well as noncontrast images were obtained  This examination, like all CT scans performed in the [place], was performed utilizing techniques to minimize radiation dose exposure, including the use of iterative reconstruction and automated exposure control  3D reconstructions were performed an independent workstation, and are supplied for review    Rad dose 968 62 mGy-cm IV Contrast:  85 mL of iohexol (OMNIPAQUE)  FINDINGS: AORTA/ARTERIAL VASCULATURE:  There is no aortic dissection or intramural hematoma  Dense mitral valve calcifications     Ascending thoracic aortic aneurysmal dilatation measuring 4 6 cm unchanged given differences in technique of measurement  Motion limits evaluation  OTHER FINDINGS: LUNGS: Previously seen lung nodules seen in series 3: 4 2 mm in the middle lobe in image 66 is smaller from 4 6 mm and more groundglass in appearance 1 2 mm nodule in image 69 is smaller 2 3 mm nodule in image 73 is smaller Previous left apical nodule is resolved 3 3 mm groundglass nodule in the left lower lobe compared to solid 4 3 mm nodule in image 79 Lungs are otherwise clear  There is no tracheal or endobronchial lesion  PLEURA:  Unremarkable  HEART/PULMONARY ARTERIAL TREE:  Cardiac silhouette is normal with no clinical effusion  MEDIASTINUM AND SANJEEV:  Unremarkable  CHEST WALL AND LOWER NECK:   Unremarkable  VISUALIZED STRUCTURES IN THE UPPER ABDOMEN: Subcentimeter hepatic cysts appear unchanged  VISUALIZED OSSEOUS STRUCTURES: Anterior osteophytes in the midthoracic spine  No acute fracture or destructive osseous lesion  Impression: Unchanged ascending thoracic aortic aneurysm measuring 4 6 cm  Previous lung nodules are smaller, less prominent and groundglass compared to solid appearance  Workstation performed: [id number]     Cardiac catheterization    Result Date: 10/3/2022  Narrative: · Mid LAD lesion is 80% stenosed  Single vessel disease, with an 80% stenosis of the LAD immediately distal to the D1 bifurcation  Plan: surgical AVR, possible aneurysm repair  YOBANY Anesthesia    Result Date: 10/4/2022  Narrative: Ora Fierro MD     10/4/2022  6:01 PM Procedure Performed: YOBANY Anesthesia Start Time:  10/4/2022 8:20 AM Preanesthesia Checklist Patient identified, IV assessed, risks and benefits discussed, monitors and equipment assessed, procedure being performed at surgeon's request and anesthesia consent obtained   Procedure Diagnostic Indications for YOBANY:  assessment of ascending aorta, assessment of surgical repair and hemodynamic monitoring  Type of YOBANY: complete YOBANY with interpretation  Images Saved: ultrasound permanent image saved  Physician Requesting Echo: Hilda Barrera MD   Location performed: OR  Intubated  Bite block not placed  Heart visualized  Insertion of YOBANY Probe:  Atraumatic  Probe Type:  Epiaortic and multiplane  Modalities:  3D, color flow mapping, continuous wave Doppler and pulse wave Doppler  Echocardiographic and Doppler Measurements PREPROCEDURE LEFT VENTRICLE: Systolic Function: normal  Ejection Fraction: 60%  RIGHT VENTRICLE: Systolic Function: normal   Cavity size normal   AORTIC VALVE: Leaflets: fusion oc LCC and RCC; severely calcified and bileaflet  Mean Gradient: 60 mmHg  Peak Gradient: 90 mmHg  Maximum velocity (cm/s): 4 7 m/sec  Regurgitation: moderate  Pressure Half-time: 474 ms  MITRAL VALVE: Leaflets: calcified and normal  Leaflet Motions: chordal CHRISTIANO  Regurgitation: mild  TRICUSPID VALVE: Leaflets: normal    Regurgitation: mild  ASCENDING AORTA:  Diameter (cm): 4 4-4 5 cm (YOBANY and epi-aortic) Dissection not present  AORTIC ARCH:   dissection not present  Grade 3: atheroma protruding < 0 5 cm into lumen  DESCENDING AORTA:   Dissection not present  Grade 3: atheroma protruding < 0 5 cm into lumen  RIGHT ATRIUM:  No spontaneous echo contrast  LEFT ATRIUM:  No spontaneous echo contrast  LEFT ATRIAL APPENDAGE:  No spontaneous echo contrast Emptying Velocity: 41 cm/sec  OTHER ATRIAL FINDINGS: No JAY clot  ATRIAL SEPTUM: Intra-atrial septal morphology: no PFO by CFD  EPIAORTIC: Plaque Thickness: 0-5 mm  POSTPROCEDURE LEFT VENTRICLE: Unchanged   RIGHT VENTRICLE: Systolic Function: top normal  AORTIC VALVE: Leaflets: bioprosthetic valve in aortic position, well-seated, does not rock, good leaflet excursion and closure, no PVL and bioprosthetic    Mean Gradient: not accurate, given presence iof CHRISTIANO - gradient across AV/LVOT 15 mmHg ("dagger shape, sugesting LVOTO)   MITRAL VALVE:  Regurgitation: mild-mod; significant chordal CHRISTIANO remains; gradient across LVOT/AV 15 mmHg  TRICUSPID VALVE: Unchanged   AORTA: Unchanged   Dissection: Dissection not present  REMOVAL: Probe Removal: atraumatic  VAS carotid complete study    Result Date: 9/24/2022  Narrative:  [facility] REPORT CLINICAL: Indications: Patient presents for a general health evaluation secondary to future open heart surgery  Patient is asymptomatic at this time  Operative History: Patient denies any previous cardiovascular surgery Risk Factors The patient has history of HTN and Hyperlipidemia  Clinical Right Pressure:  128/70 mm Hg, Left Pressure:  130/72 mm Hg  FINDINGS:  Right        Impression  PSV  EDV (cm/s)  Direction of Flow  Ratio  Dist  ICA                 55          21                      0 65  Mid  ICA                  51          17                      0 60  Prox  ICA    1 - 49%      67          22                      0 78  Dist CCA                  67          18                            Mid CCA                   86          15                      1 25  Prox CCA                  69          15                      1 80  Ext Carotid               43           8                      0 51  Prox Vert                 25           7  Antegrade                 Subclavian                92           0                            Innominate                38           8                             Left         Impression  PSV  EDV (cm/s)  Direction of Flow  Ratio  Dist  ICA                 53          23                      0 90  Mid  ICA                  44          17                      0 73  Prox   ICA    1 - 49%      44          13                      0 74  Dist CCA                  50          23                            Mid CCA                   60          18                      0 88  Prox CCA 68          19                            Ext Carotid               58           7                      0 98  Prox Vert                 62          20  Antegrade                 Subclavian                57           0                               CONCLUSION: Impression RIGHT: There is <50% stenosis noted in the internal carotid artery  Plaque is heterogenous and irregular  Vertebral artery flow is antegrade  There is no significant subclavian artery disease  LEFT: There is <50% stenosis noted in the internal carotid artery  Plaque is heterogenous and irregular  Vertebral artery flow is antegrade  There is no significant subclavian artery disease  No previous study to compare  Recommend repeat duplex in 2 years to follow up on plaque progression  Internal carotid artery stenosis determination by consensus criteria from: Manuelito Singh et al  Carotid Artery Stenosis: Gray-Scale and Doppler US Diagnosis - Society of Radiologists in [facility] [address], Radiology 2003; 109:[id number]  SIGNATURE: Electronically Signed by: Tomás Sneed MD, [address] on 2022-09-24 12:13:49 AM    XR chest portable ICU    Result Date: 10/4/2022  Narrative: CHEST INDICATION:   S/P open heart  AVR and CABG 10/4/2022  COMPARISON:  Chest CT 9/23/2022  EXAM PERFORMED/VIEWS:  XR CHEST PORTABLE ICU FINDINGS:  ET tube 2 cm above the daja  NG tube below the diaphragm  Right jugular catheter in upper SVC  Right jugular pulmonary artery catheter in main pulmonary artery  Normal heart size  CABG   AVR  Sternal wires well aligned  Two mediastinal drains  Left chest tube  No acute disease  No effusion or pneumothorax  Osseous structures appear within normal limits for patient age  Impression: Expected appearance after cardiac surgery with no acute disease  Workstation performed: [id number]     YOBANY intraop interventional w/realtime guidance of cardiac procedures    Result Date: 10/14/2022  Narrative:  This order contains the linked images for the YOBANY that was performed by the Anesthesiologist   Please see the  CARDIAC YOBANY ANESTHESIA procedure for results  Review of Systems:  Review of Systems   Constitutional: Positive for fatigue  Musculoskeletal: Positive for arthralgias and myalgias  Right lower rib pain    All other systems reviewed and are negative  Physical Exam:  Physical Exam  Vitals reviewed  Constitutional:       Appearance: Normal appearance  Cardiovascular:      Rate and Rhythm: Normal rate and regular rhythm  Pulses: Normal pulses  Heart sounds: Normal heart sounds  Pulmonary:      Effort: Pulmonary effort is normal       Breath sounds: Normal breath sounds  Abdominal:      General: Bowel sounds are normal       Palpations: Abdomen is soft  Musculoskeletal:         General: Normal range of motion  Cervical back: Normal range of motion and neck supple  Comments: Right lower anterior rib intercostal muscle pain reproduced with palpation   Skin:     General: Skin is warm and dry  Capillary Refill: Capillary refill takes less than 2 seconds  Comments: Sternal incision appears clean dry intact approximated without erythema or ecchymosis  Left CT site with protruding hardened blood    Neurological:      General: No focal deficit present  Mental Status: He is alert and oriented to person, place, and time  Psychiatric:         Mood and Affect: Mood normal          Behavior: Behavior normal          Discussion/Summary:  1  10/04/22 Mr Israel was electively admitted and underwent AVR #25mm Inspiris Reslia, CABG x 1 DALE to LAD by Dr Krystal Troncoso  Metoprolol tartrate 25mg Q12 hours decreased to 12 5mg Q12 hours due to prolonged ID interval 240ms  Continue on  Aspirin 81 mg daily, Lipitor 80 mg ferrous sulfate 325 mg daily Prilosec 20 mg daily amiodarone 200 mg daily, vitamin-C 500 mg daily    He is aware of life long pre medication with an antibiotic one hour prior to dental procedures  DASH diet   2  Paroxymal Atrial Fibrillation [id number] ( age)   Post operative occurring multiple times during his post operative course, EKG in the office today shows NSR with 1st degree AVB, QRS 459ms,  Continue on Coumadin goal INR 2 0 - 3 0 managed by [facility], INR On 10/19 22 2 42  Continue on Amiodarone 200mg daily for 3 months  Metoprolol tartrate 25mg Q12 hours was decreased to 12 5mg Q12 hours for SD of 240ms  3  RUQ pain on PE found to be intercostal rib pain,  Instructed to take Tylenol PRN per package instructions or Oxycodone per CT surgery discharge instruction  4  Hypertension RUE sitting 118/70 continue on  Metoprolol succinate 12 5 mg q 12 hours  5  Hyperlipidemia 9/23/22 , TG 76, HDL 75,   Continue on Lipitor 80 mg daily will need follow-up fasting lipid profile after his next cardiology follow-up visit

## 2022-10-19 ENCOUNTER — ANTICOAG VISIT (OUTPATIENT)
Dept: CARDIOLOGY CLINIC | Facility: CLINIC | Age: 72
End: 2022-10-19

## 2022-10-19 ENCOUNTER — LAB REQUISITION (OUTPATIENT)
Dept: LAB | Facility: HOSPITAL | Age: 72
End: 2022-10-19
Payer: COMMERCIAL

## 2022-10-19 ENCOUNTER — HOME CARE VISIT (OUTPATIENT)
Dept: HOME HEALTH SERVICES | Facility: HOME HEALTHCARE | Age: 72
End: 2022-10-19
Payer: COMMERCIAL

## 2022-10-19 VITALS
SYSTOLIC BLOOD PRESSURE: 124 MMHG | DIASTOLIC BLOOD PRESSURE: 68 MMHG | RESPIRATION RATE: 16 BRPM | OXYGEN SATURATION: 98 % | TEMPERATURE: 98.2 F | HEART RATE: 77 BPM

## 2022-10-19 DIAGNOSIS — I97.89 POSTOPERATIVE ATRIAL FIBRILLATION (HCC): Primary | ICD-10-CM

## 2022-10-19 DIAGNOSIS — I48.91 POSTOPERATIVE ATRIAL FIBRILLATION (HCC): Primary | ICD-10-CM

## 2022-10-19 DIAGNOSIS — Z79.01 LONG TERM (CURRENT) USE OF ANTICOAGULANTS: ICD-10-CM

## 2022-10-19 LAB
INR PPP: 2.42 (ref 0.84–1.19)
PROTHROMBIN TIME: 26.6 SECONDS (ref 11.6–14.5)

## 2022-10-19 PROCEDURE — 85610 PROTHROMBIN TIME: CPT | Performed by: STUDENT IN AN ORGANIZED HEALTH CARE EDUCATION/TRAINING PROGRAM

## 2022-10-19 PROCEDURE — G0299 HHS/HOSPICE OF RN EA 15 MIN: HCPCS

## 2022-10-20 ENCOUNTER — OFFICE VISIT (OUTPATIENT)
Dept: CARDIOLOGY CLINIC | Facility: CLINIC | Age: 72
End: 2022-10-20

## 2022-10-20 ENCOUNTER — HOME CARE VISIT (OUTPATIENT)
Dept: HOME HEALTH SERVICES | Facility: HOME HEALTHCARE | Age: 72
End: 2022-10-20
Payer: COMMERCIAL

## 2022-10-20 VITALS
OXYGEN SATURATION: 98 % | HEART RATE: 77 BPM | SYSTOLIC BLOOD PRESSURE: 122 MMHG | DIASTOLIC BLOOD PRESSURE: 78 MMHG | WEIGHT: 185.8 LBS | HEIGHT: 71 IN | BODY MASS INDEX: 26.01 KG/M2

## 2022-10-20 DIAGNOSIS — R10.11 RUQ PAIN: ICD-10-CM

## 2022-10-20 DIAGNOSIS — I48.91 ATRIAL FIBRILLATION, UNSPECIFIED TYPE (HCC): ICD-10-CM

## 2022-10-20 DIAGNOSIS — I10 HYPERTENSION, UNSPECIFIED TYPE: ICD-10-CM

## 2022-10-20 DIAGNOSIS — E78.5 HYPERLIPIDEMIA, UNSPECIFIED HYPERLIPIDEMIA TYPE: ICD-10-CM

## 2022-10-20 DIAGNOSIS — Z95.1 S/P CABG X 1: ICD-10-CM

## 2022-10-20 DIAGNOSIS — Z95.2 S/P AVR (AORTIC VALVE REPLACEMENT): Primary | ICD-10-CM

## 2022-10-20 PROCEDURE — G0151 HHCP-SERV OF PT,EA 15 MIN: HCPCS

## 2022-10-21 ENCOUNTER — APPOINTMENT (OUTPATIENT)
Dept: LAB | Age: 72
End: 2022-10-21
Payer: COMMERCIAL

## 2022-10-21 ENCOUNTER — TELEPHONE (OUTPATIENT)
Dept: CARDIAC REHAB | Facility: CLINIC | Age: 72
End: 2022-10-21

## 2022-10-21 ENCOUNTER — HOME CARE VISIT (OUTPATIENT)
Dept: HOME HEALTH SERVICES | Facility: HOME HEALTHCARE | Age: 72
End: 2022-10-21
Payer: COMMERCIAL

## 2022-10-21 VITALS
TEMPERATURE: 97.2 F | RESPIRATION RATE: 16 BRPM | HEART RATE: 66 BPM | DIASTOLIC BLOOD PRESSURE: 66 MMHG | OXYGEN SATURATION: 97 % | SYSTOLIC BLOOD PRESSURE: 126 MMHG

## 2022-10-21 VITALS — OXYGEN SATURATION: 97 % | SYSTOLIC BLOOD PRESSURE: 140 MMHG | DIASTOLIC BLOOD PRESSURE: 90 MMHG | HEART RATE: 72 BPM

## 2022-10-21 PROCEDURE — G0299 HHS/HOSPICE OF RN EA 15 MIN: HCPCS

## 2022-10-24 ENCOUNTER — HOME CARE VISIT (OUTPATIENT)
Dept: HOME HEALTH SERVICES | Facility: HOME HEALTHCARE | Age: 72
End: 2022-10-24
Payer: COMMERCIAL

## 2022-10-24 ENCOUNTER — OFFICE VISIT (OUTPATIENT)
Dept: CARDIOLOGY CLINIC | Facility: CLINIC | Age: 72
End: 2022-10-24
Payer: COMMERCIAL

## 2022-10-24 ENCOUNTER — ANTICOAG VISIT (OUTPATIENT)
Dept: CARDIOLOGY CLINIC | Facility: CLINIC | Age: 72
End: 2022-10-24

## 2022-10-24 ENCOUNTER — LAB REQUISITION (OUTPATIENT)
Dept: LAB | Facility: HOSPITAL | Age: 72
End: 2022-10-24
Payer: COMMERCIAL

## 2022-10-24 VITALS
OXYGEN SATURATION: 97 % | SYSTOLIC BLOOD PRESSURE: 128 MMHG | RESPIRATION RATE: 16 BRPM | TEMPERATURE: 97.2 F | DIASTOLIC BLOOD PRESSURE: 60 MMHG | HEART RATE: 67 BPM

## 2022-10-24 VITALS
HEIGHT: 71 IN | WEIGHT: 186.5 LBS | OXYGEN SATURATION: 98 % | HEART RATE: 70 BPM | DIASTOLIC BLOOD PRESSURE: 76 MMHG | BODY MASS INDEX: 26.11 KG/M2 | SYSTOLIC BLOOD PRESSURE: 118 MMHG

## 2022-10-24 VITALS — SYSTOLIC BLOOD PRESSURE: 140 MMHG | OXYGEN SATURATION: 99 % | DIASTOLIC BLOOD PRESSURE: 78 MMHG | HEART RATE: 72 BPM

## 2022-10-24 DIAGNOSIS — I48.91 POSTOPERATIVE ATRIAL FIBRILLATION (HCC): Primary | ICD-10-CM

## 2022-10-24 DIAGNOSIS — Q23.1 BICUSPID AORTIC VALVE: ICD-10-CM

## 2022-10-24 DIAGNOSIS — I25.10 CORONARY ARTERY DISEASE INVOLVING NATIVE HEART WITHOUT ANGINA PECTORIS, UNSPECIFIED VESSEL OR LESION TYPE: ICD-10-CM

## 2022-10-24 DIAGNOSIS — I35.0 NONRHEUMATIC AORTIC VALVE STENOSIS: Primary | ICD-10-CM

## 2022-10-24 DIAGNOSIS — I97.89 POSTOPERATIVE ATRIAL FIBRILLATION (HCC): Primary | ICD-10-CM

## 2022-10-24 DIAGNOSIS — Z48.812 ENCOUNTER FOR SURGICAL AFTERCARE FOLLOWING SURGERY ON THE CIRCULATORY SYSTEM: ICD-10-CM

## 2022-10-24 LAB
INR PPP: 2.89 (ref 0.84–1.19)
PROTHROMBIN TIME: 30.5 SECONDS (ref 11.6–14.5)

## 2022-10-24 PROCEDURE — 99214 OFFICE O/P EST MOD 30 MIN: CPT | Performed by: STUDENT IN AN ORGANIZED HEALTH CARE EDUCATION/TRAINING PROGRAM

## 2022-10-24 PROCEDURE — G0151 HHCP-SERV OF PT,EA 15 MIN: HCPCS

## 2022-10-24 PROCEDURE — 85610 PROTHROMBIN TIME: CPT | Performed by: STUDENT IN AN ORGANIZED HEALTH CARE EDUCATION/TRAINING PROGRAM

## 2022-10-24 PROCEDURE — G0299 HHS/HOSPICE OF RN EA 15 MIN: HCPCS

## 2022-10-24 NOTE — PROGRESS NOTES
Outpatient Cardiology Consult Note - Madai Staff 74 y o  male MRN: [id number]    @ Encounter: [id number]      Patient Active Problem List    Diagnosis Date Noted   • Anticoagulated on Coumadin 10/07/2022   • Postoperative atrial fibrillation ([facility] [address] ) 10/07/2022   • Hyponatremia 10/07/2022   • Postoperative anemia due to acute blood loss 10/07/2022   • Thrombocytopenia ([facility] [address] ) 10/07/2022   • Leukocytosis 10/07/2022   • S/P CABG x 1 10/04/2022   • Coronary artery disease 10/04/2022   • S/P AVR (aortic valve replacement) 10/04/2022   • Nonrheumatic aortic valve stenosis    • Ascending aortic aneurysm    • Nonrheumatic aortic valve insufficiency    • Pre-diabetes    • Hyperlipidemia    • Bicuspid aortic valve      Assessment:  74 y o  male Hx per chart p/w second opinion for timing of intervention for Ao aneurysm, BAV, severe AS, moderate AI; follows at  male   at [facility]  Bicuspid Ao valve, severe AS, Moderate AI  Asc Ao aneurysm 4 6cm on CT, 4 3cm by echo, stable over serial imaging  S/p 10/4/2022 bioAVR and CABGx1 [facility], Dr Jessi Boyle  Metoprolol tartrate 25mg Q12 hours decreased to 12 5mg Q12 hours due to prolonged SD interval 240ms     Recurrent post op AF  LVH, IVSd 1 8cm, intracavitary gradient on intraop YOBANY  Preserved EF 60%  L hip OA  Pre DM  HTN  HLD      Cr wnl    a1c 5 9  tsh 2 2  ASCVD 32% at 8 yr  a1c 5 9    TODAY's PLAN:  S/p bioAVR and CABGx1 10/4/22, doing well since  Warm/dry, euvolemic today; RRR on exam  Ambulating further, using stairs, doing all ADLs, mild SABILLON ongoing but improving    Recent RUQ pain likely MSK per CTS - resolved    cw ASA and coumadin for AF post op  cw atorva 80 daily    cw lopressor 25 bid    Per CTS: Continue on Amiodarone 200mg daily for 3 months    Continue with current medications, no changes today    Advised stay well hydrated    Will need ongoing annual Asc Ao aneurysm surveillance - next check 10/2023    Studies:    10/4/22 intraop YOBANY:  Echocardiographic and Doppler Measurements     PREPROCEDURE     LEFT VENTRICLE:  Systolic Function: normal  Ejection Fraction: 60%          RIGHT VENTRICLE:  Systolic Function: normal   Cavity size normal                    AORTIC VALVE:  Leaflets: fusion oc LCC and RCC; severely calcified and bileaflet  Mean Gradient: 60 mmHg  Peak Gradient: 90 mmHg  Maximum velocity (cm/s): 4 7 m/sec  Regurgitation: moderate  Pressure Half-time: 474 ms       MITRAL VALVE:  Leaflets: calcified and normal  Leaflet Motions: chordal CHRISTIANO  Regurgitation: mild            TRICUSPID VALVE:  Leaflets: normal    Regurgitation: mild                     ASCENDING AORTA:   Diameter (cm): 4 4-4 5 cm (YOBANY and epi-aortic) Dissection not present        AORTIC ARCH:    dissection not present  Grade 3: atheroma protruding < 0 5 cm into lumen      DESCENDING AORTA:    Dissection not present  Grade 3: atheroma protruding < 0 5 cm into lumen            RIGHT ATRIUM:   No spontaneous echo contrast      LEFT ATRIUM:   No spontaneous echo contrast      LEFT ATRIAL APPENDAGE:   No spontaneous echo contrast Emptying Velocity: 41 cm/sec      OTHER ATRIAL FINDINGS:  No JAY clot      ATRIAL SEPTUM:  Intra-atrial septal morphology: no PFO by CFD                 EPIAORTIC:  Plaque Thickness: 0-5 mm            POSTPROCEDURE     LEFT VENTRICLE: Unchanged            RIGHT VENTRICLE:   Systolic Function: top normal             AORTIC VALVE:   Leaflets: bioprosthetic valve in aortic position, well-seated, does not rock, good leaflet excursion and closure, no PVL and bioprosthetic  Mean Gradient: not accurate, given presence iof CHRISTIANO - gradient across AV/LVOT 15 mmHg ("dagger shape, sugesting LVOTO)        MITRAL VALVE:    Regurgitation: mild-mod; significant chordal CHRISTIANO remains; gradient across LVOT/AV 15 mmHg        TRICUSPID VALVE: Unchanged   [facility] 9/30/22:     · Mid LAD lesion is 80% stenosed       Single vessel disease, with an 80% stenosis of the LAD immediately distal to the D1 bifurcation  Plan: surgical AVR, possible aneurysm repair  EST 8/29/22:  •  Resting ECG: There is left ventricular hypertrophy without strain  There is a myocardial infarction located in the anteroseptal region  •  Blood pressure demonstrated a hypotensive response and heart rate demonstrated a normal response to stress  •  Stress ECG: Horizontal ST depression of 1 0 mm in the inferior leads is noted  The ECG was positive for ischemia  The stress ECG is consistent with ischemia after maximal exercise, without reproduction of symptoms  8/25/22 TTE:  •  Left Ventricle: Left ventricular cavity size is normal  Wall thickness is moderate to severely increased  There is moderate concentric hypertrophy  There is severe asymmetric hypertrophy of the septal wall  The left ventricular ejection fraction is 70%  Systolic function is vigorous  Wall motion is normal  Diastolic function is mildly abnormal, consistent with grade I (abnormal) relaxation  •  Left Atrium: The atrium is mildly dilated  •  Aortic Valve: The aortic valve is functionally bicuspid with commissural fusion of the right-left coronary cusp  The leaflets are severely thickened  The leaflets are moderately calcified  There is severely reduced mobility  There is moderate regurgitation  There is severe stenosis  The aortic valve peak velocity is 4 34 m/s  The aortic valve mean gradient is 49 mmHg  The dimensionless velocity index is 0 30  The aortic valve area is 0 85 cm2  •  Mitral Valve: There is mild to moderate annular calcification  There is mild regurgitation  •  Tricuspid Valve: There is mild regurgitation  •  Pulmonic Valve: There is mild regurgitation  •  Aorta: The aortic root is mildly dilated  The ascending aorta is mildly dilated  The aortic root is 4 10 cm  The ascending aorta is 4 2 cm  IVSd 1 8cm    EKG:  Personally reviewed by me    10/20/22: nsr, 1oavb, nl axis, LVH, non specific STT changes  8/18/22: nsr, nl axis, LVH, LAE, anterior Q waves, non specific STT changes    9/23/22 CT chest:  IMPRESSION:     Unchanged ascending thoracic aortic aneurysm measuring 4 6 cm      Previous lung nodules are smaller, less prominent and groundglass compared to solid appearance  [facility] data:  TTE (1/2022): • Left Ventricle: There is severe concentric hypertrophy  Systolic function is normal with an ejection fraction of 60-65%  There is grade I (mild) diastolic dysfunction and normal left atrial pressure  • Left Atrium: Left atrium cavity is moderately dilated  • Aortic Valve: There is at least moderate regurgitation  There is severe stenosis  • Ascending Aorta: The aortic root is mildly dilated  The ascending aorta is mildly dilated  • Tricuspid Valve: There is mild regurgitation  CT chest (9/21): Stable pulmonary nodularity at 3 years follow-up      Heavy aortic valve calcification with fusiform ascending aortic aneurysm  Unchanged    Direct review of study demonstrates unchanged appearance of aorta measuring approximately 4 5 cm in maximum diameter  CT chest 9/2021  Heavy aortic valve calcification with fusiform ascending aortic aneurysm  Unchanged 4 5 cm     HPI:   74 y o  male Hx per chart p/w second opinion for timing of intervention for Ao aneurysm, BAV, severe AS, moderate AI; follows at [facility]  EF preserved and LV not dilated per OSH TTE  Aside from new sore throat as described above, he reports being in excellent health, 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, goes to gym multiple times weekly and uses weights, elliptical, situps - never gets worse sob, cp, palpitations, dizziness  Syncope in heat ovr 20 years ago, none since  Feels good energy level  Reports no progression of cardiac sx or fatigue in recent months/years  No new CP/SOB/dizziness/palpitations/syncope  No new leg swelling, PND, pillow orthopnea, unintentional weight changes      11/2021 OSH cards note:  Stable at 4 5-4 6 cm for the past 2-3 years  Follow up with CTS  Aware of ~5 cm threshold for surgery given his BAV  He will avoid heavy lifting and exertion  BP is controlled    Home meds:  • aspirin 81 MG EC tablet Take 81 mg by mouth daily  • cartilage/collagen/bor/hyalur (JOINT HEALTH ORAL) Take 120 capsules by mouth  • lisinopriL (PRINIVIL,ZESTRIL) 20 MG tablet TAKE 1 TABLET BY MOUTH DAILY 90 tablet 3   • magnesium oxide 200 mg magnesium chew Take by mouth  Magnesium w/ b6   • multivitamin (THERAGRAN) tab Take 1 tablet by mouth daily  • SAW PALMETTO FRUIT ORAL Take 1 tablet by mouth daily  • sildenafil, antihypertensive, (REVATIO) 20 mg tablet Use 3-5 tabs PO prn prior to sexual activity 30 tablet 5     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   He drinks 2-3 glasses of red wine nightly  He is originally from [facility]  No current tobacco use, alcohol abuse, illicit drug use  Interval events:  Lengthy discussion with pt 9/1/22, regarding calcified BAV, Asc Ao aneurysm 4 6cm by outside CT scan (stable size), recent EST with hypotensive exercise response, dizziness, ischemic ECG  S/p bioAVR and CABGx1 10/4/22, doing well since  Warm/dry, euvolemic today; RRR on exam  Ambulating further, using stairs, doing all ADLs, mild SABILLON ongoing but improving    No new CP/dizziness/palpitations/syncope  No new leg swelling, PND, pillow orthopnea, unintentional weight changes, fatigue    No new fevers, chills, cough, nausea, vomiting, diarrhea, dysuri      Past Medical History:   Diagnosis Date   • Aortic stenosis    • Ascending aortic aneurysm    • Hyperlipidemia    • Hypertension    • Nonrheumatic aortic valve insufficiency    • Osteoarthritis    • Prostatitis        ROS:  10 point ROS negative except as specified in HPI    Allergies   Allergen Reactions   • Pollen Extract Nasal Congestion       Current Outpatient Medications   Medication Instructions   • acetaminophen (TYLENOL) 650 mg, Oral, Every 8 hours PRN   • amiodarone 200 mg tablet Take twice daily for 14 days, then decrease to once daily for an additional 14 days  • ascorbic acid (VITAMIN C) 500 mg, Oral, Daily   • aspirin (ECOTRIN LOW STRENGTH) 81 mg, Oral, Daily   • atorvastatin (LIPITOR) 80 mg, Oral, Daily with dinner   • ferrous sulfate 325 mg, Oral, Daily with breakfast   • metoprolol tartrate (LOPRESSOR) 25 mg, Oral, Every 12 hours scheduled, 1/2 tab every 12 hours   • omeprazole (PRILOSEC) 20 mg, Oral, Daily   • warfarin (COUMADIN) 5 mg, Oral, Daily (warfarin), Unless otherwise directed  Social History     Socioeconomic History   • Marital status: /Civil Union     Spouse name: Not on file   • Number of children: Not on file   • Years of education: Not on file   • Highest education level: Not on file   Occupational History   • Not on file   Tobacco Use   • Smoking status: Never Smoker   • Smokeless tobacco: Never Used   Vaping Use   • Vaping Use: Never used   Substance and Sexual Activity   • Alcohol use: Not Currently     Alcohol/week: 28 0 standard drinks     Types: 14 Glasses of wine, 14 Shots of liquor per week     Comment: 2 small glasses of wine & 2 shots whiskey daily   • Drug use: Not Currently   • Sexual activity: Yes     Partners: Female   Other Topics Concern   • Not on file   Social History Narrative   • Not on file     Social Determinants of Health     Financial Resource Strain: Not on file   Food Insecurity: No Food Insecurity   • Worried About Running Out of Food in the Last Year: Never true   • Ran Out of Food in the Last Year: Never true   Transportation Needs: No Transportation Needs   • Lack of Transportation (Medical): No   • Lack of Transportation (Non-Medical):  No   Physical Activity: Not on file   Stress: Not on file   Social Connections: Not on file   Intimate Partner Violence: Not on file   Housing Stability: Low Risk    • Unable to Pay for Housing in the Last Year: No   • Number of Places Lived in the Last Year: 1   • Unstable Housing in the Last Year: No       No family history on file  Physical Exam:  Vitals:    10/24/22 1509   BP: 118/76   BP Location: Right arm   Patient Position: Sitting   Cuff Size: Standard   Pulse: 70   SpO2: 98%   Weight: 84 6 kg (186 lb 8 oz)   Height: 5' 11" (1 803 m)     Repeat VS - 106/70, HR 90    Constitutional: NAD, non toxic  Ears/nose/mouth/throat: atraumatic  CV: RRR, no JVD, no HJR  Resp: ctabl  GI: Soft, NTND  MSK: no swollen joints in exposed areas  Extr: No edema, WWP  Pysche: Normal affect  Neuro: appropriate in conversation  Skin: dry and intact in exposed areas    Labs & Results:    Lab Results   Component Value Date    WBC 10 00 10/10/2022    HGB 8 5 (L) 10/10/2022    HCT 25 3 (L) 10/10/2022    MCV 95 10/10/2022     10/10/2022     Lab Results   Component Value Date    SODIUM 136 10/10/2022    K 3 4 (L) 10/10/2022     10/10/2022    CO2 27 10/10/2022    BUN 18 10/10/2022    CREATININE 0 77 10/10/2022    GLUC 114 10/10/2022    CALCIUM 8 2 (L) 10/10/2022     No results found for: BNP   Lab Results   Component Value Date    CHOLESTEROL 238 (H) 09/23/2022     Lab Results   Component Value Date    HDL 75 09/23/2022     Lab Results   Component Value Date    TRIG 76 09/23/2022     Lab Results   Component Value Date    [place] 163 09/23/2022       Counseling / Coordination of Care  Time spent today 38 minutes  Greater than 50% of total time was spent with the patient and / or family counseling and / or coordination of care  We discussed diagnoses, most recent studies and any changes in treatment  Thank you for the opportunity to participate in the care of this patient      José Luis Leonard MD  Attending Physician  Advanced Heart Failure and Transplant Cardiology  [address]

## 2022-10-25 ENCOUNTER — ANTICOAG VISIT (OUTPATIENT)
Dept: CARDIOLOGY CLINIC | Facility: CLINIC | Age: 72
End: 2022-10-25

## 2022-10-25 DIAGNOSIS — I97.89 POSTOPERATIVE ATRIAL FIBRILLATION (HCC): Primary | ICD-10-CM

## 2022-10-25 DIAGNOSIS — I48.91 POSTOPERATIVE ATRIAL FIBRILLATION (HCC): Primary | ICD-10-CM

## 2022-10-27 ENCOUNTER — TELEPHONE (OUTPATIENT)
Dept: LAB | Facility: HOSPITAL | Age: 72
End: 2022-10-27

## 2022-10-27 ENCOUNTER — ANTICOAG VISIT (OUTPATIENT)
Dept: CARDIOLOGY CLINIC | Facility: CLINIC | Age: 72
End: 2022-10-27

## 2022-10-27 ENCOUNTER — HOME CARE VISIT (OUTPATIENT)
Dept: HOME HEALTH SERVICES | Facility: HOME HEALTHCARE | Age: 72
End: 2022-10-27
Payer: COMMERCIAL

## 2022-10-27 ENCOUNTER — APPOINTMENT (OUTPATIENT)
Dept: LAB | Age: 72
End: 2022-10-27
Payer: COMMERCIAL

## 2022-10-27 VITALS — OXYGEN SATURATION: 97 % | HEART RATE: 72 BPM | DIASTOLIC BLOOD PRESSURE: 78 MMHG | SYSTOLIC BLOOD PRESSURE: 120 MMHG

## 2022-10-27 VITALS
HEART RATE: 77 BPM | OXYGEN SATURATION: 98 % | TEMPERATURE: 97.2 F | SYSTOLIC BLOOD PRESSURE: 122 MMHG | DIASTOLIC BLOOD PRESSURE: 68 MMHG | RESPIRATION RATE: 16 BRPM

## 2022-10-27 DIAGNOSIS — I97.89 POSTOPERATIVE ATRIAL FIBRILLATION (HCC): Primary | ICD-10-CM

## 2022-10-27 DIAGNOSIS — I48.91 POSTOPERATIVE ATRIAL FIBRILLATION (HCC): Primary | ICD-10-CM

## 2022-10-27 PROCEDURE — G0299 HHS/HOSPICE OF RN EA 15 MIN: HCPCS

## 2022-10-27 PROCEDURE — G0151 HHCP-SERV OF PT,EA 15 MIN: HCPCS

## 2022-10-27 NOTE — CASE COMMUNICATION
Patient requests  dc from home PT as he is doing well and will be going to cardiac rehab as of next week    Patient independent in adls,transfers,hep,and ambulation without devices   Further home PT to be dc  Radha Reid Sine

## 2022-11-03 ENCOUNTER — ANTICOAG VISIT (OUTPATIENT)
Dept: CARDIOLOGY CLINIC | Facility: CLINIC | Age: 72
End: 2022-11-03

## 2022-11-03 ENCOUNTER — APPOINTMENT (OUTPATIENT)
Dept: LAB | Facility: HOSPITAL | Age: 72
End: 2022-11-03

## 2022-11-03 DIAGNOSIS — I48.91 POSTOPERATIVE ATRIAL FIBRILLATION (HCC): Primary | ICD-10-CM

## 2022-11-03 DIAGNOSIS — I97.89 POSTOPERATIVE ATRIAL FIBRILLATION (HCC): Primary | ICD-10-CM

## 2022-11-03 DIAGNOSIS — I97.89 POSTOPERATIVE ATRIAL FIBRILLATION (HCC): ICD-10-CM

## 2022-11-03 DIAGNOSIS — I48.91 POSTOPERATIVE ATRIAL FIBRILLATION (HCC): ICD-10-CM

## 2022-11-03 LAB
INR PPP: 2.26 (ref 0.84–1.19)
PROTHROMBIN TIME: 25.2 SECONDS (ref 11.6–14.5)

## 2022-11-04 ENCOUNTER — OFFICE VISIT (OUTPATIENT)
Dept: CARDIAC SURGERY | Facility: CLINIC | Age: 72
End: 2022-11-04

## 2022-11-04 VITALS
WEIGHT: 186 LBS | HEART RATE: 76 BPM | DIASTOLIC BLOOD PRESSURE: 76 MMHG | OXYGEN SATURATION: 97 % | HEIGHT: 71 IN | SYSTOLIC BLOOD PRESSURE: 111 MMHG | BODY MASS INDEX: 26.04 KG/M2 | TEMPERATURE: 98.8 F

## 2022-11-04 DIAGNOSIS — Z79.01 ANTICOAGULATED ON COUMADIN: ICD-10-CM

## 2022-11-04 DIAGNOSIS — Z95.1 S/P CABG X 1: Primary | ICD-10-CM

## 2022-11-04 DIAGNOSIS — Q23.1 BICUSPID AORTIC VALVE: ICD-10-CM

## 2022-11-04 DIAGNOSIS — I97.89 POSTOPERATIVE ATRIAL FIBRILLATION (HCC): ICD-10-CM

## 2022-11-04 DIAGNOSIS — I48.91 POSTOPERATIVE ATRIAL FIBRILLATION (HCC): ICD-10-CM

## 2022-11-04 DIAGNOSIS — Z95.2 S/P AVR (AORTIC VALVE REPLACEMENT): ICD-10-CM

## 2022-11-04 DIAGNOSIS — I25.10 CORONARY ARTERY DISEASE INVOLVING NATIVE HEART WITHOUT ANGINA PECTORIS, UNSPECIFIED VESSEL OR LESION TYPE: ICD-10-CM

## 2022-11-04 NOTE — PROGRESS NOTES
Procedure: S/P aortic valve replacement and coronary artery bypass grafting, performed on 10/4/22    History: Padmini Santana is a 74y o  year old male who presents to our office today for routine post-surgical follow up care  He underwent AVR/CABG on 10/4  Postoperatively he required blood products for high chest tube output with resolution  He was slowly weaned from David  He developed Afib with RVR and given IV amio  He converted to NSR  He had another episode of Afib and again converted to NSR  He was started on coumadin and continued on po Amio on discharge  He was discharged home on 10/10  Since his discharge, he reports feeling well & an improvement in symptoms  He admits to some SOB with exertion which is improving and occasional chest tightness which improves with tylenol  Denies CP, palpitations, lightheadedness, dizziness, presyncopal symptoms, recent weight gain/loss, LE edema b/l, or signs of incisional erythema/tenderness/discharge  He has been compliant with his driving/lifting restrictions up to this point  He ambulates daily at home  He states his incisional pain has been well controlled with oral medication  He had a follow-up appointment with cardiology on 10/20  He had a follow up appointment with his PCP on 10/25  Vital Signs: There were no vitals filed for this visit  Home Medications:   Prior to Admission medications    Medication Sig Start Date End Date Taking? Authorizing Provider   acetaminophen (TYLENOL) 650 mg CR tablet Take 1 tablet (650 mg total) by mouth every 8 (eight) hours as needed for mild pain  Patient not taking: No sig reported 10/10/22 11/9/22  Francheska Lu PA-C   amiodarone 200 mg tablet Take twice daily for 14 days, then decrease to once daily for an additional 14 days   10/10/22   Francheska Lu PA-C   ascorbic acid (VITAMIN C) 500 MG tablet Take 1 tablet (500 mg total) by mouth daily Do not start before October 11, 2022  10/11/22 1/9/23  Francheska Lu PA-C aspirin (ECOTRIN LOW STRENGTH) 81 mg EC tablet Take 81 mg by mouth daily    Historical Provider, MD   atorvastatin (LIPITOR) 80 mg tablet Take 1 tablet (80 mg total) by mouth daily with dinner 10/10/22   [facility]   ferrous sulfate 325 (65 Fe) mg tablet Take 1 tablet (325 mg total) by mouth daily with breakfast Do not start before October 11, 2022  10/11/22 1/9/23  [facility]   metoprolol tartrate (LOPRESSOR) 25 mg tablet Take 1 tablet (25 mg total) by mouth every 12 (twelve) hours 1/2 tab every 12 hours 10/20/22   ANT Snow   omeprazole (PriLOSEC) 20 mg delayed release capsule Take 1 capsule (20 mg total) by mouth daily 10/10/22 11/9/22  [facility]   warfarin (COUMADIN) 5 mg tablet Take 1 tablet (5 mg total) by mouth daily Unless otherwise directed  10/10/22   [facility]       Physical Exam:    HEENT/NECK:  Normocephalic  Atraumatic   no jugular venous distention  Cardiac: Regular rate and rhythm and No murmurs/rubs/gallops  Pulmonary:  Breath sounds clear bilaterally and No rales/rhonchi/wheezes  Abdomen:  Non-tender, Non-distended and Normal bowel sounds  Incisions: Sternum is stable  Incision is clean, dry, and intact  Extremities: Extremities warm/dry and No edema B/L  Neuro: Alert and oriented X 3  Sensation is grossly intact  No focal deficits  Skin: Warm/Dry, without rashes or lesions  Lab Results:               Invalid input(s): LABGLOM  Results from last 7 days   Lab Units 11/03/22  0851   INR  2 26*     Lab Results   Component Value Date    HGBA1C 5 7 (H) 09/23/2022     No results found for: CKTOTAL, CKMB, CKMBINDEX, TROPONINI    Imaging Studies:     none    Assessment: Aortic stenosis, Non-Rheumatic, Coronary artery disease  S/P aortic valve replacement and coronary artery bypass grafting;    Plan:     Marie Herman continues to recover well following surgery  To date they have made progressive improvements physical rehabilitation   At this point I have cleared them to begin outpatient cardiac rehabilitation and have encouraged them to to do so  Goldy Redd has also been cleared to resume driving at this point  I asked that them do so in progressive increments  I did remind them of their ongoing lifting restrictions of 25 pounds for an additional 2 months  Goldy Redd has already been evaluated by their primary care physician cardiologist for ongoing medical care  At this point I have not scheduled them for routine followup care with our office  I have advised them to call with any new concerns that may arise  Goldy Redd was comfortable with our recommendations and their questions were answered to their satisfaction  The patient recently had a screening colonoscopy in 2017  Therefore GI referral is not indicated at this time  Carroll Maki  11/04/22    * This note was completed in part utilizing [employer] direct voice recognition software  Grammatical errors, random word insertion, spelling mistakes, and incomplete sentences may be an occasional consequence of the system secondary to software limitations, ambient noise and hardware issues  At the time of dictation, efforts were made to edit, clarify and /or correct errors  Please read the chart carefully and recognize, using context, where substitutions have occurred  If you have any questions or concerns about the context, text or information contained within the body of this dictation, please contact myself, the provider, for further clarification

## 2022-11-10 ENCOUNTER — ANTICOAG VISIT (OUTPATIENT)
Dept: CARDIOLOGY CLINIC | Facility: CLINIC | Age: 72
End: 2022-11-10

## 2022-11-10 ENCOUNTER — APPOINTMENT (OUTPATIENT)
Dept: LAB | Facility: HOSPITAL | Age: 72
End: 2022-11-10

## 2022-11-10 DIAGNOSIS — I97.89 POSTOPERATIVE ATRIAL FIBRILLATION (HCC): Primary | ICD-10-CM

## 2022-11-10 DIAGNOSIS — I97.89 POSTOPERATIVE ATRIAL FIBRILLATION (HCC): ICD-10-CM

## 2022-11-10 DIAGNOSIS — I48.91 POSTOPERATIVE ATRIAL FIBRILLATION (HCC): ICD-10-CM

## 2022-11-10 DIAGNOSIS — I48.91 POSTOPERATIVE ATRIAL FIBRILLATION (HCC): Primary | ICD-10-CM

## 2022-11-10 LAB
ABO GROUP BLD: NORMAL
BLD GP AB SCN SERPL QL: NEGATIVE
INR PPP: 1.6 (ref 0.84–1.19)
PROTHROMBIN TIME: 19.3 SECONDS (ref 11.6–14.5)
RH BLD: POSITIVE
SPECIMEN EXPIRATION DATE: NORMAL

## 2022-11-15 ENCOUNTER — CLINICAL SUPPORT (OUTPATIENT)
Dept: CARDIAC REHAB | Age: 72
End: 2022-11-15

## 2022-11-15 DIAGNOSIS — Z95.2 S/P AVR (AORTIC VALVE REPLACEMENT): ICD-10-CM

## 2022-11-15 DIAGNOSIS — Z95.1 S/P CABG X 1: ICD-10-CM

## 2022-11-15 DIAGNOSIS — Q23.1 BICUSPID AORTIC VALVE: ICD-10-CM

## 2022-11-15 NOTE — PROGRESS NOTES
CARDIAC REHAB ASSESSMENT    Today's date: November 15, 2022  Patient name: Pavan Smith     : 1950       MRN: [id number]  PCP: Joseline Arias MD  Referring Physician: Neel Hayes,*  Cardiologist: Wilma Sesay MD  Surgeon: Mary Mcclain MD  Dx:   Encounter Diagnoses   Name Primary? • Bicuspid aortic valve    • S/P CABG x 1    • S/P AVR (aortic valve replacement)        Date of onset: 10/4/22  Cultural needs: none ([place])    Weight    Wt Readings from Last 1 Encounters:   22 84 4 kg (186 lb)      Height:   Ht Readings from Last 1 Encounters:   22 5' 11" (1 803 m)     Medical History:   Past Medical History:   Diagnosis Date   • Aortic stenosis    • Ascending aortic aneurysm    • Hyperlipidemia    • Hypertension    • Nonrheumatic aortic valve insufficiency    • Osteoarthritis    • Prostatitis          Physical Limitations: R hip arthritis    Fall Risk: Low   Comments: Ambulates with a steady gait with no assist device and Denies a fall in the past 6 months    Anginal Equivalent: None/denies angina   NTG use: No prescription    Risk Factors   Cholesterol: Yes  Smoking: Never used  HTN: Yes  DM: No  Obesity: Yes   Inactivity: No  Stress:  perceived  stress: 6/10   Stressors: financial stress in the past which affects him today, commute 90 mins - 2-3 times/wk, Type A - offended with the system to get his green card   Goals for Stress Management:Read and Exercise    Family History:No family history on file  Allergies: Pollen extract  ETOH:   Social History     Substance and Sexual Activity   Alcohol Use Not Currently   • Alcohol/week: 28 0 standard drinks   • Types: 14 Glasses of wine, 14 Shots of liquor per week    Comment: 2 small glasses of wine & 2 shots whiskey daily         Current Medications:   Current Outpatient Medications   Medication Sig Dispense Refill   • amiodarone 200 mg tablet Take twice daily for 14 days, then decrease to once daily for an additional 14 days   43 tablet 0   • ascorbic acid (VITAMIN C) 500 MG tablet Take 1 tablet (500 mg total) by mouth daily Do not start before October 11, 2022  90 tablet 0   • aspirin (ECOTRIN LOW STRENGTH) 81 mg EC tablet Take 81 mg by mouth daily     • atorvastatin (LIPITOR) 80 mg tablet Take 1 tablet (80 mg total) by mouth daily with dinner 30 tablet 2   • ferrous sulfate 325 (65 Fe) mg tablet Take 1 tablet (325 mg total) by mouth daily with breakfast Do not start before October 11, 2022  90 tablet 0   • metoprolol tartrate (LOPRESSOR) 25 mg tablet Take 1 tablet (25 mg total) by mouth every 12 (twelve) hours 1/2 tab every 12 hours 60 tablet 2   • omeprazole (PriLOSEC) 20 mg delayed release capsule Take 1 capsule (20 mg total) by mouth daily 30 capsule 0   • warfarin (COUMADIN) 5 mg tablet Take 1 tablet (5 mg total) by mouth daily Unless otherwise directed  60 tablet 2     No current facility-administered medications for this visit           Functional Status Prior to Diagnosis for Treatment   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)  Recreation: walking  ADL’s: No limitations  [place]: No limitations  Exercise: [employer] with wife  Other:     Current Functional Status  Occupation: plans to return to work end of the year  Recreation: walking  ADL’s:Capable of performing light to moderate ADLs  [place]: No limitations  Exercise: walking with incline daily - 30 mins  Other: steps to second floor - now no problem    Patient Specific Goals:  5lb wt loss, return to exercise at [employer] , dietary modifications - reducing intake of salami and cheese    Short Term Program Goals: dietary modifications increased strength improved energy/stamina with ADLs exercise 120-150 mins/wk wt loss 1-2 ppw    Long Term Goals: Improved lipid profile  Reduced stress  weight loss goal of 5lbs  improved Rate Your Plate Score    Ability to reach goals/rehabilitation potential:  Excellent    Projected return to function: 8-12 weeks  Objective tests: sub-max TM ETT      Nutritional   Reviewed details of Rate your Plate  Discussed key elements of heart healthy eating  Reviewed patient goals for dietary modifications and their clinical implications  Reviewed most recent lipid profile  Red wine - 1 glass/night  Oatmeal, rye bread with cheese/salami, fruit snack  No sweets, hates salt  Salads - balslmic vinegar    Goals for dietary modification: choose lean cuts of meat  poultry without the skin  low fat ground meat and poultry  eliminate processed meats  reduce portions of meat to 3 oz  increase fish intake  more meatless meals  low fat dairy   reduced fat cheese  increase whole grains  increase fruits and vegetables  eliminate butter  low sodium  improved snack choices  more nuts/seeds  reduce sweets/frozen desserts  heathier choices while dining out      Emotional/Social  Patient reports he is coping well with good social support and denies depression or anxiety  Reports sufficient emotional support    Marital status:  - from [place] - extremely friendly pleasant  Friends in the [place] community  neighbors  Children live in [place]  He is Romanian    Domestic Violence Screening: No    Comments: current event:  PCP was following heart murmur and inquired about other sxs which he denied  [facility] surgeon did not return his call regarding scheduling surgery  Decided to get started with [facility] - Dr Mario Ramírez set him up with Stress test - positive   Scheduled for AVR - during pre-op testing was found to have single vessel CAD  AAA - 4 6cm - no restrictions

## 2022-11-15 NOTE — PROGRESS NOTES
Cardiac Rehabilitation Plan of Care   Initial Care Plan          Today's date: 11/15/2022   # of Exercise Sessions Completed: Initial Evaluation Today  Patient name: Edgar Amaral      : 1950  Age: 74 y o  MRN: [id number]  Referring Physician: Franklin Ramirez,*  Cardiologist: Kiara Godoy MD  Provider: Tara Louie  Clinician: Carissa Nevarez, MS, CEP, CCRP      Dx:   Encounter Diagnoses   Name Primary? • Bicuspid aortic valve    • S/P CABG x 1    • S/P AVR (aortic valve replacement)      Date of onset: 10/4/22      SUMMARY OF PROGRESS:  Today is Jose's initial evaluation to begin Cardiac Rehab post CABG x 1/AVR on 2022  The patient does currently follow a formal exercise program at home, including daily 30 min walks which includes a decent incline  He has resumed light to moderate ADLs following sternal restrictions and without fatigue and tolerating them well  Depression screening using the PHQ-9 interprets the patient's score of  1  as  1-4 = Minimal Depression  RAÚL-7 screening tool for anxiety suggests 1  0-4  = Not anxious  When addressed, the patient denies having depression/anxiety  Patient reports excellent social/emotional support from his wife and community  Information to begin using [employer] was provided as well as contact information for counseling through [employer]  PHQ-9 score will be reassessed in 30 days  The patient is a non-smoker  Patient admits to 100% medication compliance  Patient reports the following physical limitations: R hip OA  The patient completed an initial submaximal TM ETT  The patient completed 1 minutes of stage IV (5 8METs) with test termination of RPE 6  Resting  /90 with appropriate hemodynamic response to exercise reaching 194/92  Patient denied symptoms during exercise  Telemetry revealed NSR, occ PACs    Patient was counseled on exercise guidelines to achieve a minimum of 150 mins/wk of moderate intensity (RPE 4-6) exercise and encouraged to add 1-2 days of exercise on opposite days of cardiac rehab as tolerated  We discussed current dietary habits and goals of heart healthy eating for lipid management and weight loss  Patient's goals include: dietary modifications including reducing intake of cheese and salami, 5lb wt loss, return to [employer] , 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  The patient's CAD risk factors include:  stress, obesity/overweight, hypertension and hyperlipidemia  His education will focus on lifestyle modification/education specific to His needs  Patient will attend group education classes on heart healthy eating, reading food labels, stress management, risk factor reduction, understanding heart disease and common heart medications  Patient will attend 35 monitored exercise sessions, 3x/wk for 12-18 weeks beginning November 16, 2022  Medication compliance: Yes   Comments: Pt reports to be compliant with medications  Fall Risk: Low   Comments: Ambulates with a steady gait with no assist device and Denies a fall in the past 6 months    EKG Interpretation: NSR, occ PACs      EXERCISE ASSESSMENT and PLAN    Exercise Prescription:      Frequency: 3 days/week   Supplement with home exercise 2+ days/wk as tolerated       Minutes: 30-40         METS: 2 4 - 4 5            HR: 100-125   RPE: 4-5         Modalities: Treadmill, Airdyne bike, UBE, Lifecycle and Elliptical      30 Day Goals for Exercise Progression:    Frequency: 3 days/week of cardiac rehab       Supplement with home exercise 2+ days/wk as tolerated    Minutes: 40                              >150 mins/wk of moderate intensity exercise   METS: 3 4-5 2   HR: 100-125    RPE: 4-6   Modalities: Treadmill, Airdyne bike, Lifecycle, Elliptical and Rower    Strength training:   Will be added following at least 8 weeks post surgery and 8-10 monitored sessions   Modalities: Leg Press, Chest Press, Pull Downs and Lateral Raise    Home Exercise: Type: walking, Frequency: 7 days/week, Duration: 30 mins    Goals: 10% improvement in functional capacity - based on max METs achieved in fitness assessment, Increase in exercise capacity by 40% - based on peak METs tolerated in cardiac rehab exercise session, Exercise 5 days/wk, >150mins/wk of moderate intensity exercise, Resume ADLs with increased strength, Return to work unrestricted and return to regular exercise at the gym    Progression Toward Goals:  Reviewed Pt goals and determined plan of care, Will continue to educate and progress as tolerated  Education: benefit of exercise for CAD risk factors, AHA guidelines to achieve >150 mins/wk of moderate exercise and RPE scale   Plan:education on home exercise guidelines, home exercise 30+ mins 2 days opposite CR and Education class: Risk Factors for Heart Disease  Readiness to change: Action:  (Changing behavior)      NUTRITION ASSESSMENT AND PLAN    Weight control:    Starting weight: 187 7   Current weight:     Waist circumference:    Startin   Current:      Diabetes: N/A  A1c: 5 9    last measured: 22    Lipid management: Discussed diet and lipid management and Last lipid profile 22  Chol 238  TRG 76  HDL 75      Goals:reduced BMI to < 25, LDL <100, CHOL <200, Improved Rate Your Plate score  >47, Wt  loss 1-2 ppw,  goal of 180 lbs  , choose lean meat (93-95%), eliminate processed meats, reduce portion sizes of meat to 3oz or less, eat 3 or more servings of whole grains a day, seldom eat or choose low fat ice-cream, fruit juice bars or frozen yogurt  and daily saturated fat intake <7%/13g    Progression Toward Goals: Reviewed Pt goals and determined plan of care, Will continue to educate and progress as tolerated      Education: heart healthy eating  hydration  nutrition for  lipid management  wt  loss   Plan: Education class: Reading Food Labels, Education Class: Heart Healthy Eating, switch to low fat cheeses, reduce portion sizes, reduce red meat 1x/wk, switch to skim or 1% milk, avoid processed foods, drink more water, learn how to read food labels and keep added daily sugar <25g/day  Readiness to change: Action:  (Changing behavior)      PSYCHOSOCIAL ASSESSMENT AND PLAN    Emotional:  Depression assessment:  PHQ-9 = 1  1-4 = Minimal Depression            Anxiety measure:  RAÚL-7 = 1  0-4  = Not anxious  Self-reported stress level:  6  Social support: Excellent and Patient reports excellent emotional/social support from wife and their community    Goals:  Reduce perceived stress to 1-3/10, Social Activities in Vaughn Score < 3, increased energy and Handle anger/frustration better    Progression Toward Goals: Reviewed Pt goals and determined plan of care, Will continue to educate and progress as tolerated  Education: benefits of a positive support system and stress management techniques  Plan: Class: Stress and Your Health, Class: Relaxation and Exercise  Readiness to change: Preparation:  (Getting ready to change)       OTHER CORE COMPONENTS     Tobacco:   Social History     Tobacco Use   Smoking Status Never Smoker   Smokeless Tobacco Never Used       Tobacco Use Intervention:   N/A:  Patient is a non-smoker     Anginal Symptoms:  None   NTG use: No prescription    Blood pressure:    Restin/90   Exercise: 194/92    Goals: consistent BP < 130/80, reduced dietary sodium <2300mg, moderate intensity exercise >150 mins/wk and medication compliance    Progression Toward Goals: Reviewed Pt goals and determined plan of care, Will continue to educate and progress as tolerated      Education:  understanding high blood pressure and it's relationship to CAD and low sodium diet and HTN  Plan: Class: Understanding Heart Disease, Class: Common Heart Medications, Avoid Processed foods, engage in regular exercise, eliminate salt shaker at the table, use salt substitutes and check labels for sodium content  Readiness to change: Action:  (Changing behavior)

## 2022-11-16 ENCOUNTER — CLINICAL SUPPORT (OUTPATIENT)
Dept: CARDIAC REHAB | Age: 72
End: 2022-11-16

## 2022-11-16 DIAGNOSIS — Z95.1 S/P CABG X 1: ICD-10-CM

## 2022-11-16 DIAGNOSIS — Z95.2 S/P AVR (AORTIC VALVE REPLACEMENT): ICD-10-CM

## 2022-11-17 ENCOUNTER — ANTICOAG VISIT (OUTPATIENT)
Dept: CARDIOLOGY CLINIC | Facility: CLINIC | Age: 72
End: 2022-11-17

## 2022-11-17 ENCOUNTER — APPOINTMENT (OUTPATIENT)
Dept: LAB | Facility: HOSPITAL | Age: 72
End: 2022-11-17

## 2022-11-17 DIAGNOSIS — I97.89 POSTOPERATIVE ATRIAL FIBRILLATION (HCC): ICD-10-CM

## 2022-11-17 DIAGNOSIS — I97.89 POSTOPERATIVE ATRIAL FIBRILLATION (HCC): Primary | ICD-10-CM

## 2022-11-17 DIAGNOSIS — I48.91 POSTOPERATIVE ATRIAL FIBRILLATION (HCC): Primary | ICD-10-CM

## 2022-11-17 DIAGNOSIS — I48.91 POSTOPERATIVE ATRIAL FIBRILLATION (HCC): ICD-10-CM

## 2022-11-17 LAB
INR PPP: 1.54 (ref 0.84–1.19)
PROTHROMBIN TIME: 18.8 SECONDS (ref 11.6–14.5)

## 2022-11-18 ENCOUNTER — CLINICAL SUPPORT (OUTPATIENT)
Dept: CARDIAC REHAB | Age: 72
End: 2022-11-18

## 2022-11-18 DIAGNOSIS — Z95.1 S/P CABG X 1: ICD-10-CM

## 2022-11-18 DIAGNOSIS — Z95.2 S/P AVR (AORTIC VALVE REPLACEMENT): ICD-10-CM

## 2022-11-21 ENCOUNTER — CLINICAL SUPPORT (OUTPATIENT)
Dept: CARDIAC REHAB | Age: 72
End: 2022-11-21

## 2022-11-21 DIAGNOSIS — Z95.1 S/P CABG X 1: ICD-10-CM

## 2022-11-21 DIAGNOSIS — Z95.2 S/P AVR (AORTIC VALVE REPLACEMENT): ICD-10-CM

## 2022-11-23 ENCOUNTER — CLINICAL SUPPORT (OUTPATIENT)
Dept: CARDIAC REHAB | Age: 72
End: 2022-11-23

## 2022-11-23 DIAGNOSIS — Z95.1 S/P CABG X 1: Primary | ICD-10-CM

## 2022-11-23 DIAGNOSIS — Z95.2 S/P AVR (AORTIC VALVE REPLACEMENT): ICD-10-CM

## 2022-11-25 ENCOUNTER — APPOINTMENT (OUTPATIENT)
Dept: CARDIAC REHAB | Age: 72
End: 2022-11-25

## 2022-11-28 ENCOUNTER — APPOINTMENT (OUTPATIENT)
Dept: CARDIAC REHAB | Age: 72
End: 2022-11-28

## 2022-11-30 ENCOUNTER — APPOINTMENT (OUTPATIENT)
Dept: CARDIAC REHAB | Age: 72
End: 2022-11-30

## 2022-12-05 ENCOUNTER — CLINICAL SUPPORT (OUTPATIENT)
Dept: CARDIAC REHAB | Age: 72
End: 2022-12-05

## 2022-12-05 DIAGNOSIS — Z95.1 S/P CABG X 1: ICD-10-CM

## 2022-12-05 DIAGNOSIS — Z95.2 S/P AVR (AORTIC VALVE REPLACEMENT): ICD-10-CM

## 2022-12-07 ENCOUNTER — CLINICAL SUPPORT (OUTPATIENT)
Dept: CARDIAC REHAB | Age: 72
End: 2022-12-07

## 2022-12-07 DIAGNOSIS — Z95.1 S/P CABG X 1: ICD-10-CM

## 2022-12-07 DIAGNOSIS — Z95.2 S/P AVR (AORTIC VALVE REPLACEMENT): ICD-10-CM

## 2022-12-08 ENCOUNTER — ANTICOAG VISIT (OUTPATIENT)
Dept: CARDIOLOGY CLINIC | Facility: CLINIC | Age: 72
End: 2022-12-08

## 2022-12-08 ENCOUNTER — DOCUMENTATION (OUTPATIENT)
Dept: CARDIOLOGY CLINIC | Facility: CLINIC | Age: 72
End: 2022-12-08

## 2022-12-08 ENCOUNTER — CLINICAL SUPPORT (OUTPATIENT)
Dept: CARDIAC REHAB | Age: 72
End: 2022-12-08

## 2022-12-08 ENCOUNTER — APPOINTMENT (OUTPATIENT)
Dept: LAB | Facility: HOSPITAL | Age: 72
End: 2022-12-08
Attending: STUDENT IN AN ORGANIZED HEALTH CARE EDUCATION/TRAINING PROGRAM

## 2022-12-08 DIAGNOSIS — Z95.2 S/P AVR (AORTIC VALVE REPLACEMENT): ICD-10-CM

## 2022-12-08 DIAGNOSIS — I97.89 POSTOPERATIVE ATRIAL FIBRILLATION (HCC): Primary | ICD-10-CM

## 2022-12-08 DIAGNOSIS — Z95.1 S/P CABG X 1: Primary | ICD-10-CM

## 2022-12-08 DIAGNOSIS — I97.89 POSTOPERATIVE ATRIAL FIBRILLATION (HCC): ICD-10-CM

## 2022-12-08 DIAGNOSIS — I48.91 POSTOPERATIVE ATRIAL FIBRILLATION (HCC): Primary | ICD-10-CM

## 2022-12-08 DIAGNOSIS — I48.91 POSTOPERATIVE ATRIAL FIBRILLATION (HCC): ICD-10-CM

## 2022-12-08 LAB
INR PPP: 1.46 (ref 0.84–1.19)
PROTHROMBIN TIME: 17.9 SECONDS (ref 11.6–14.5)

## 2022-12-09 ENCOUNTER — APPOINTMENT (OUTPATIENT)
Dept: CARDIAC REHAB | Age: 72
End: 2022-12-09

## 2022-12-12 ENCOUNTER — CLINICAL SUPPORT (OUTPATIENT)
Dept: CARDIAC REHAB | Age: 72
End: 2022-12-12

## 2022-12-12 DIAGNOSIS — Z95.2 S/P AVR (AORTIC VALVE REPLACEMENT): ICD-10-CM

## 2022-12-12 DIAGNOSIS — Z95.1 S/P CABG X 1: Primary | ICD-10-CM

## 2022-12-14 ENCOUNTER — CLINICAL SUPPORT (OUTPATIENT)
Dept: CARDIAC REHAB | Age: 72
End: 2022-12-14

## 2022-12-14 DIAGNOSIS — Z95.2 S/P AVR (AORTIC VALVE REPLACEMENT): ICD-10-CM

## 2022-12-14 DIAGNOSIS — Z95.1 S/P CABG X 1: ICD-10-CM

## 2022-12-15 ENCOUNTER — APPOINTMENT (OUTPATIENT)
Dept: LAB | Facility: HOSPITAL | Age: 72
End: 2022-12-15

## 2022-12-15 DIAGNOSIS — I97.89 POSTOPERATIVE ATRIAL FIBRILLATION (HCC): ICD-10-CM

## 2022-12-15 DIAGNOSIS — I48.91 POSTOPERATIVE ATRIAL FIBRILLATION (HCC): ICD-10-CM

## 2022-12-15 NOTE — PROGRESS NOTES
Cardiac Rehabilitation Plan of Care   30 Day Reassessment          Today's date: 12/15/2022   # of Exercise Sessions Completed: 10  Patient name: Gurjit Mccrary      : 1950  Age: 74 y o  MRN: [id number]  Referring Physician: Trina Malloy PA-C  Cardiologist: Alex Tao MD  Provider: Jean Barth  Clinician: Darek Layne MS, CEP      Dx:   Encounter Diagnoses   Name Primary? • S/P CABG x 1    • S/P AVR (aortic valve replacement)      Date of onset: 10/4/22      SUMMARY OF PROGRESS:   12/15: Judie Heimlich is compliant attending cardiac rehab exercise sessions 3x/wk  He tolerates 40 mins at 3 1 - 4 9 METs plus wt training  He is tolerating progression of intensity levels to maintain RPE 4-6  Resting BP   134/80 - 148/90 with appropriate response to exercise reaching 140/84- 154/80  NSR on tele with 1AVB and rare PACs observed  On , a 5 beat SVT noted during exercise  RHR 77 - 92 ExHR 94 - 124  He maintains a home exercise 7 days/wk which includes 30 mins of daily walking with decent incline  No cardiac complaints while in rehab  He did report pain, occurring outside of rehab, that precipitates randomly located in the center of his chest describing it as less than 1 on 1-10 scale  Patient admits to 100% medication compliance  Lopressor 25mg QD added to pt's med list as of   He states that his warfarin is still being adjusted  He is progressing toward wt loss goals with a loss of 1 pounds  Patient has been working on  dietary modifications with the goal of rare red/processed meats, low fat dairy, reduced added sugars and refined flours  He states he has cut down his consumption of salami & cheese  The patient is a non-smoker  When addressed, the patient denies depression/anxiety  Patient reports excellent social/emotional support  Patient attends group educational classes on cardiac risk factor modification  His exercise program will be progressed as tolerated to maintain RPE 4-6   The patient has the following personal goals he hopes to achieved by discharge:  dietary modifications including reducing intake of cheese and salami, 5lb wt loss, return to [employer] , 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  All goals are currently in progress  He reports increased confidence with exertion, improved endurance, stamina and energy and less stress  Pt will continue to be educated on lifestyle modifications and encouraged to supplement with a home exercise program to reach the following goals in the next 30 days: continue to lose weight and drink more water  11/15: Today is Jsoe's initial evaluation to begin Cardiac Rehab post CABG x 1/AVR on October 4, 2022  The patient does currently follow a formal exercise program at home, including daily 30 min walks which includes a decent incline  He has resumed light to moderate ADLs following sternal restrictions and without fatigue and tolerating them well  Depression screening using the PHQ-9 interprets the patient's score of  1  as  1-4 = Minimal Depression  RAÚL-7 screening tool for anxiety suggests 1  0-4  = Not anxious  When addressed, the patient denies having depression/anxiety  Patient reports excellent social/emotional support from his wife and community  Information to begin using [employer] was provided as well as contact information for counseling through [employer]  PHQ-9 score will be reassessed in 30 days  The patient is a non-smoker  Patient admits to 100% medication compliance  Patient reports the following physical limitations: R hip OA  The patient completed an initial submaximal TM ETT  The patient completed 1 minutes of stage IV (5 8METs) with test termination of RPE 6  Resting  /90 with appropriate hemodynamic response to exercise reaching 194/92  Patient denied symptoms during exercise  Telemetry revealed NSR, occ PACs    Patient was counseled on exercise guidelines to achieve a minimum of 150 mins/wk of moderate intensity (RPE 4-6) exercise and encouraged to add 1-2 days of exercise on opposite days of cardiac rehab as tolerated  We discussed current dietary habits and goals of heart healthy eating for lipid management and weight loss  Patient's goals include: dietary modifications including reducing intake of cheese and salami, 5lb wt loss, return to [employer] , 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  The patient's CAD risk factors include:  stress, obesity/overweight, hypertension and hyperlipidemia  His education will focus on lifestyle modification/education specific to His needs  Patient will attend group education classes on heart healthy eating, reading food labels, stress management, risk factor reduction, understanding heart disease and common heart medications  Patient will attend 35 monitored exercise sessions, 3x/wk for 12-18 weeks beginning 2022         Medication compliance: Yes   Comments: Pt reports to be compliant with medications  Fall Risk: Low   Comments: Ambulates with a steady gait with no assist device and Denies a fall in the past 6 months    EKG Interpretation: NSR, occ PACs      EXERCISE ASSESSMENT and PLAN    Exercise Prescription:      Frequency: 3 days/week   Supplement with home exercise 2+ days/wk as tolerated       Minutes: 40       METS: 3 1-4 9           HR:    RPE: 4-5         Modalities: Treadmill, Airdyne bike, UBE, Lifecycle, Elliptical and Rower      30 Day Goals for Exercise Progression:    Frequency: 3 days/week of cardiac rehab       Supplement with home exercise 2+ days/wk as tolerated    Minutes: 40                              >150 mins/wk of moderate intensity exercise   METS: 3 5-6 0   HR: 100-125    RPE: 4-6   Modalities: Treadmill, Airdyne bike, Lifecycle, Elliptical and Rower    Strength trainin-3 days / week  12-15 repetitions  1-2 sets per modality    Modalities: Leg Press, Chest Press, Pull Downs and Lateral Raise    Home Exercise: Type: walking, Frequency: 7 days/week, Duration: 30 mins    Goals: 10% improvement in functional capacity - based on max METs achieved in fitness assessment, Increase in exercise capacity by 40% - based on peak METs tolerated in cardiac rehab exercise session, Exercise 5 days/wk, >150mins/wk of moderate intensity exercise, Resume ADLs with increased strength, Return to work unrestricted and return to regular exercise at the gym    Progression Toward Goals:  Pt is progressing and showing improvement  toward the following goals:  exercsie 5days/wk, resume ADL's, returned to work unrestricted   , Will continue to educate and progress as tolerated  Education: benefit of exercise for CAD risk factors, AHA guidelines to achieve >150 mins/wk of moderate exercise and RPE scale   Plan:education on home exercise guidelines, home exercise 30+ mins 2 days opposite CR and Education class: Risk Factors for Heart Disease  Readiness to change: Action:  (Changing behavior)      NUTRITION ASSESSMENT AND PLAN    Weight control:    Starting weight: 187 7   Current weight:   187  Waist circumference:    Startin   Current:      Diabetes: N/A  A1c: 5 9    last measured: 22    Lipid management: Discussed diet and lipid management and Last lipid profile 22  Chol 238  TRG 76  HDL 75      Goals:reduced BMI to < 25, LDL <100, CHOL <200, Improved Rate Your Plate score  >67, Wt  loss 1-2 ppw,  goal of 180 lbs  , choose lean meat (93-95%), eliminate processed meats, reduce portion sizes of meat to 3oz or less, eat 3 or more servings of whole grains a day, seldom eat or choose low fat ice-cream, fruit juice bars or frozen yogurt  and daily saturated fat intake <7%/13g    Progression Toward Goals: Pt is progressing and showing improvement  toward the following goals:  reduced BMI to < 25, LDL <100, CHOL <200, Improved Rate Your Plate score  >54, Wt  loss 1-2 ppw,  goal of 180 lbs  , choose lean meat (93-95%), eliminate processed meats, reduce portion sizes of meat to 3oz or less, eat 3 or more servings of whole grains a day, seldom eat or choose low fat ice-cream, fruit juice bars or frozen yogurt  and daily saturated fat intake <7%/13g   , Will continue to educate and progress as tolerated  Education: heart healthy eating  hydration  nutrition for  lipid management  wt  loss   education class: Heart Healthy Eating  education class:  Label Reading  Plan: switch to low fat cheeses, reduce portion sizes, reduce red meat 1x/wk, switch to skim or 1% milk, avoid processed foods, drink more water, learn how to read food labels and keep added daily sugar <25g/day  Readiness to change: Action:  (Changing behavior)      PSYCHOSOCIAL ASSESSMENT AND PLAN    Emotional:  Depression assessment:  PHQ-9 = 1  1-4 = Minimal Depression            Anxiety measure:  RAÚL-7 = 1  0-4  = Not anxious  Self-reported stress level:  6 f/u: 12/14 pt reports stress is reducing 3/10  Social support: Excellent and Patient reports excellent emotional/social support from wife and their community    Goals:  Reduce perceived stress to 1-3/10, Social Activities in Vaughn Score < 3, increased energy and Handle anger/frustration better    Progression Toward Goals: Pt is progressing and showing improvement  toward the following goals:  reducing stress, increased energy  , Will continue to educate and progress as tolerated      Education: benefits of a positive support system and stress management techniques  Plan: Class: Stress and Your Health, Class: Relaxation and Exercise  Readiness to change: Action:  (Changing behavior)      OTHER CORE COMPONENTS     Tobacco:   Social History     Tobacco Use   Smoking Status Never   Smokeless Tobacco Never       Tobacco Use Intervention:   N/A:  Patient is a non-smoker     Anginal Symptoms:  None   NTG use: No prescription    Blood pressure:    Restin//90   Exercise: 140//80    Goals: consistent BP < 130/80, reduced dietary sodium <2300mg, moderate intensity exercise >150 mins/wk and medication compliance    Progression Toward Goals: Pt is progressing and showing improvement  toward the following goals:  reduced dietary sodium <2300mg, moderate intensity exercise >150 mins/wk and medication compliance   , Will continue to educate and progress as tolerated      Education:  understanding high blood pressure and it's relationship to CAD and low sodium diet and HTN  Plan: Class: Understanding Heart Disease, Class: Common Heart Medications, Avoid Processed foods, engage in regular exercise, eliminate salt shaker at the table, use salt substitutes and check labels for sodium content  Readiness to change: Action:  (Changing behavior)

## 2022-12-16 ENCOUNTER — CLINICAL SUPPORT (OUTPATIENT)
Dept: CARDIAC REHAB | Age: 72
End: 2022-12-16

## 2022-12-16 ENCOUNTER — APPOINTMENT (OUTPATIENT)
Dept: CARDIAC REHAB | Age: 72
End: 2022-12-16

## 2022-12-16 DIAGNOSIS — Z95.2 S/P AVR (AORTIC VALVE REPLACEMENT): ICD-10-CM

## 2022-12-16 DIAGNOSIS — Z95.1 S/P CABG X 1: Primary | ICD-10-CM

## 2022-12-17 ENCOUNTER — APPOINTMENT (OUTPATIENT)
Dept: LAB | Facility: HOSPITAL | Age: 72
End: 2022-12-17
Attending: STUDENT IN AN ORGANIZED HEALTH CARE EDUCATION/TRAINING PROGRAM

## 2022-12-17 LAB
INR PPP: 1.49 (ref 0.84–1.19)
PROTHROMBIN TIME: 18.2 SECONDS (ref 11.6–14.5)

## 2022-12-19 ENCOUNTER — ANTICOAG VISIT (OUTPATIENT)
Dept: CARDIOLOGY CLINIC | Facility: CLINIC | Age: 72
End: 2022-12-19

## 2022-12-19 ENCOUNTER — CLINICAL SUPPORT (OUTPATIENT)
Dept: CARDIAC REHAB | Age: 72
End: 2022-12-19

## 2022-12-19 DIAGNOSIS — I97.89 POSTOPERATIVE ATRIAL FIBRILLATION (HCC): Primary | ICD-10-CM

## 2022-12-19 DIAGNOSIS — I48.91 POSTOPERATIVE ATRIAL FIBRILLATION (HCC): Primary | ICD-10-CM

## 2022-12-19 DIAGNOSIS — Z95.1 S/P CABG X 1: ICD-10-CM

## 2022-12-19 DIAGNOSIS — Z95.2 S/P AVR (AORTIC VALVE REPLACEMENT): ICD-10-CM

## 2022-12-21 ENCOUNTER — CLINICAL SUPPORT (OUTPATIENT)
Dept: CARDIAC REHAB | Age: 72
End: 2022-12-21

## 2022-12-21 DIAGNOSIS — Z95.2 S/P AVR (AORTIC VALVE REPLACEMENT): ICD-10-CM

## 2022-12-21 DIAGNOSIS — Z95.1 S/P CABG X 1: ICD-10-CM

## 2022-12-22 ENCOUNTER — APPOINTMENT (OUTPATIENT)
Dept: LAB | Facility: HOSPITAL | Age: 72
End: 2022-12-22

## 2022-12-22 ENCOUNTER — ANTICOAG VISIT (OUTPATIENT)
Dept: CARDIOLOGY CLINIC | Facility: CLINIC | Age: 72
End: 2022-12-22

## 2022-12-22 ENCOUNTER — CLINICAL SUPPORT (OUTPATIENT)
Dept: CARDIAC REHAB | Age: 72
End: 2022-12-22

## 2022-12-22 DIAGNOSIS — Z95.1 S/P CABG X 1: Primary | ICD-10-CM

## 2022-12-22 DIAGNOSIS — I97.89 POSTOPERATIVE ATRIAL FIBRILLATION (HCC): Primary | ICD-10-CM

## 2022-12-22 DIAGNOSIS — I97.89 POSTOPERATIVE ATRIAL FIBRILLATION (HCC): ICD-10-CM

## 2022-12-22 DIAGNOSIS — I48.91 POSTOPERATIVE ATRIAL FIBRILLATION (HCC): ICD-10-CM

## 2022-12-22 DIAGNOSIS — Z95.2 S/P AVR (AORTIC VALVE REPLACEMENT): ICD-10-CM

## 2022-12-22 DIAGNOSIS — I48.91 POSTOPERATIVE ATRIAL FIBRILLATION (HCC): Primary | ICD-10-CM

## 2022-12-22 LAB
INR PPP: 1.98 (ref 0.84–1.19)
PROTHROMBIN TIME: 22.8 SECONDS (ref 11.6–14.5)

## 2022-12-23 ENCOUNTER — APPOINTMENT (OUTPATIENT)
Dept: CARDIAC REHAB | Age: 72
End: 2022-12-23

## 2022-12-26 ENCOUNTER — APPOINTMENT (OUTPATIENT)
Dept: CARDIAC REHAB | Age: 72
End: 2022-12-26

## 2022-12-28 ENCOUNTER — APPOINTMENT (OUTPATIENT)
Dept: CARDIAC REHAB | Age: 72
End: 2022-12-28

## 2022-12-29 ENCOUNTER — ANTICOAG VISIT (OUTPATIENT)
Dept: CARDIOLOGY CLINIC | Facility: CLINIC | Age: 72
End: 2022-12-29

## 2022-12-29 ENCOUNTER — APPOINTMENT (OUTPATIENT)
Dept: LAB | Facility: HOSPITAL | Age: 72
End: 2022-12-29

## 2022-12-29 DIAGNOSIS — I97.89 POSTOPERATIVE ATRIAL FIBRILLATION (HCC): ICD-10-CM

## 2022-12-29 DIAGNOSIS — I97.89 POSTOPERATIVE ATRIAL FIBRILLATION (HCC): Primary | ICD-10-CM

## 2022-12-29 DIAGNOSIS — I48.91 POSTOPERATIVE ATRIAL FIBRILLATION (HCC): Primary | ICD-10-CM

## 2022-12-29 DIAGNOSIS — I48.91 POSTOPERATIVE ATRIAL FIBRILLATION (HCC): ICD-10-CM

## 2022-12-29 LAB
INR PPP: 1.95 (ref 0.84–1.19)
PROTHROMBIN TIME: 22.5 SECONDS (ref 11.6–14.5)

## 2022-12-30 ENCOUNTER — APPOINTMENT (OUTPATIENT)
Dept: CARDIAC REHAB | Age: 72
End: 2022-12-30

## 2023-01-02 ENCOUNTER — APPOINTMENT (OUTPATIENT)
Dept: CARDIAC REHAB | Age: 73
End: 2023-01-02

## 2023-01-03 ENCOUNTER — CLINICAL SUPPORT (OUTPATIENT)
Dept: CARDIAC REHAB | Age: 73
End: 2023-01-03

## 2023-01-03 DIAGNOSIS — Z95.1 S/P CABG X 1: Primary | ICD-10-CM

## 2023-01-03 DIAGNOSIS — Z95.2 S/P AVR (AORTIC VALVE REPLACEMENT): ICD-10-CM

## 2023-01-04 ENCOUNTER — APPOINTMENT (OUTPATIENT)
Dept: CARDIAC REHAB | Age: 73
End: 2023-01-04

## 2023-01-04 NOTE — PROGRESS NOTES
Outpatient Cardiology Consult Note - Josr Castro 74 y o  male MRN: [id number]    @ Encounter: [id number]      Patient Active Problem List    Diagnosis Date Noted   • Anticoagulated on Coumadin 10/07/2022   • Postoperative atrial fibrillation ([facility] [address] ) 10/07/2022   • Hyponatremia 10/07/2022   • Postoperative anemia due to acute blood loss 10/07/2022   • Thrombocytopenia ([facility] [address] ) 10/07/2022   • Leukocytosis 10/07/2022   • S/P CABG x 1 10/04/2022   • Coronary artery disease 10/04/2022   • S/P AVR (aortic valve replacement) 10/04/2022   • Nonrheumatic aortic valve stenosis    • Ascending aortic aneurysm    • Nonrheumatic aortic valve insufficiency    • Pre-diabetes    • Hyperlipidemia    • Bicuspid aortic valve      Assessment:  74 y o  male Hx per chart p/w second opinion for timing of intervention for Ao aneurysm, BAV, severe AS, moderate AI; followed at [facility] in past, now s/p 10/2022 bAVR+CABG as below      [place]   at [facility]  Bicuspid Ao valve, severe AS, Moderate AI  Asc Ao aneurysm 4 6cm on CT, 4 3cm by echo, stable over serial imaging  S/p 10/4/2022 bioAVR and CABGx1 [facility], Dr Tony Simpson op EF 60%  Recurrent post op AF inpt and then subsequently as outpt about 1-2 months after surgery  LVH, IVSd 1 8cm, intracavitary gradient on intraop YOBANY  Preserved EF 60% pre and post op  L hip OA  Pre DM  HTN  HLD      Cr wnl   9/2022  a1c 5 9  tsh 2 2  ASCVD 32% at 10 yr    TODAY's PLAN:  Warm, euvolemic  Appears comfortable  Doing well at cardiac rehab  Off amio now, Rx ran out (was taking it x fe wmonths post op and was on it at time of recent AF; wa snot on his prescribed bblokcer at time of recent Afib  In NSR at rehab at recent visits per tele and today 1/5/23 in office    Check cmp, tsh now, especially in setting of recent amio; also hypoK on most recent labs and needs recheck    Recheck lipids 3/2023 (after 5-6 mo post op and at least 3 mo on lipitor 80)    cw ASA  Switch coumadin (started inpt) for AF to eliquis 5 bid 1/5/23, order it now and will check prior auth  cw atorva 80 daily    Switch lopressor 25 bid to toprol xl 75 qd 1/5/23  May need more BP control in future though watch for response to increased bblocker first    Advised stay well hydrated    Will need ongoing annual Asc Ao aneurysm surveillance - next check 10/2023    Studies:    EKG:  Personally reviewed by me  10/20/22: nsr, 1oavb, nl axis, LVH, non specific STT changes  8/18/22: nsr, nl axis, LVH, LAE, anterior Q waves, non specific STT changes  1/5/23: nsr, nl axis, non specific STT changes    10/4/22 intraop YOBANY:  Echocardiographic and Doppler Measurements     PREPROCEDURE     LEFT VENTRICLE:  Systolic Function: normal  Ejection Fraction: 60%          RIGHT VENTRICLE:  Systolic Function: normal   Cavity size normal                    AORTIC VALVE:  Leaflets: fusion oc LCC and RCC; severely calcified and bileaflet  Mean Gradient: 60 mmHg  Peak Gradient: 90 mmHg  Maximum velocity (cm/s): 4 7 m/sec  Regurgitation: moderate  Pressure Half-time: 474 ms       MITRAL VALVE:  Leaflets: calcified and normal  Leaflet Motions: chordal CHRISTIANO  Regurgitation: mild            TRICUSPID VALVE:  Leaflets: normal    Regurgitation: mild      ASCENDING AORTA:   Diameter (cm): 4 4-4 5 cm (YOBANY and epi-aortic) Dissection not present        AORTIC ARCH:    dissection not present  Grade 3: atheroma protruding < 0 5 cm into lumen      DESCENDING AORTA:    Dissection not present  Grade 3: atheroma protruding < 0 5 cm into lumen            RIGHT ATRIUM:   No spontaneous echo contrast      LEFT ATRIUM:   No spontaneous echo contrast      LEFT ATRIAL APPENDAGE:   No spontaneous echo contrast Emptying Velocity: 41 cm/sec      OTHER ATRIAL FINDINGS:  No JAY clot      ATRIAL SEPTUM:  Intra-atrial septal morphology: no PFO by CFD                 EPIAORTIC:  Plaque Thickness: 0-5 mm            POSTPROCEDURE     LEFT VENTRICLE: Unchanged      RIGHT VENTRICLE:   Systolic Function: top normal             AORTIC VALVE:   Leaflets: bioprosthetic valve in aortic position, well-seated, does not rock, good leaflet excursion and closure, no PVL and bioprosthetic  Mean Gradient: not accurate, given presence iof CHRISTIANO - gradient across AV/LVOT 15 mmHg ("dagger shape, sugesting LVOTO)        MITRAL VALVE:    Regurgitation: mild-mod; significant chordal CHRISTIANO remains; gradient across LVOT/AV 15 mmHg        TRICUSPID VALVE: Unchanged   10/2022 AV path:  Final Diagnosis   A  Heart Valve, aortic valve:  - Senile calcific degeneration of three cardiac valvular leaflets, consistent with senile calcific aortic stenosis  - [place] elastin stains & trichrome stains demonstrate preservation of the usual layered valvular architecture with bulky calcifications centered in the fibrosa layer without neovascularization, acute inflammation or destructive fibrinous changes  [facility] 9/30/22:     · Mid LAD lesion is 80% stenosed  Single vessel disease, with an 80% stenosis of the LAD immediately distal to the D1 bifurcation  Plan: surgical AVR, possible aneurysm repair  EST 8/29/22:  •  Resting ECG: There is left ventricular hypertrophy without strain  There is a myocardial infarction located in the anteroseptal region  •  Blood pressure demonstrated a hypotensive response and heart rate demonstrated a normal response to stress  •  Stress ECG: Horizontal ST depression of 1 0 mm in the inferior leads is noted  The ECG was positive for ischemia  The stress ECG is consistent with ischemia after maximal exercise, without reproduction of symptoms  8/25/22 TTE:  •  Left Ventricle: Left ventricular cavity size is normal  Wall thickness is moderate to severely increased  There is moderate concentric hypertrophy  There is severe asymmetric hypertrophy of the septal wall  The left ventricular ejection fraction is 70%  Systolic function is vigorous   Wall motion is normal  Diastolic function is mildly abnormal, consistent with grade I (abnormal) relaxation  •  Left Atrium: The atrium is mildly dilated  •  Aortic Valve: The aortic valve is functionally bicuspid with commissural fusion of the right-left coronary cusp  The leaflets are severely thickened  The leaflets are moderately calcified  There is severely reduced mobility  There is moderate regurgitation  There is severe stenosis  The aortic valve peak velocity is 4 34 m/s  The aortic valve mean gradient is 49 mmHg  The dimensionless velocity index is 0 30  The aortic valve area is 0 85 cm2  •  Mitral Valve: There is mild to moderate annular calcification  There is mild regurgitation  •  Tricuspid Valve: There is mild regurgitation  •  Pulmonic Valve: There is mild regurgitation  •  Aorta: The aortic root is mildly dilated  The ascending aorta is mildly dilated  The aortic root is 4 10 cm  The ascending aorta is 4 2 cm  IVSd 1 8cm    9/23/22 CT chest:  IMPRESSION:     Unchanged ascending thoracic aortic aneurysm measuring 4 6 cm      Previous lung nodules are smaller, less prominent and groundglass compared to solid appearance  LV[facility] data:  TTE (1/2022): • Left Ventricle: There is severe concentric hypertrophy  Systolic function is normal with an ejection fraction of 60-65%  There is grade I (mild) diastolic dysfunction and normal left atrial pressure  • Left Atrium: Left atrium cavity is moderately dilated  • Aortic Valve: There is at least moderate regurgitation  There is severe stenosis  • Ascending Aorta: The aortic root is mildly dilated  The ascending aorta is mildly dilated  • Tricuspid Valve: There is mild regurgitation  CT chest (9/21): Stable pulmonary nodularity at 3 years follow-up      Heavy aortic valve calcification with fusiform ascending aortic aneurysm  Unchanged    Direct review of study demonstrates unchanged appearance of aorta measuring approximately 4 5 cm in maximum diameter      CT chest 9/2021  Heavy aortic valve calcification with fusiform ascending aortic aneurysm  Unchanged 4 5 cm     HPI:   74 y o  male Hx per chart p/w second opinion for timing of intervention for Ao aneurysm, BAV, severe AS, moderate AI; follows at [facility]  EF preserved and LV not dilated per OSH TTE  Aside from new sore throat as described above, he reports being in excellent health, 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, goes to gym multiple times weekly and uses weights, elliptical, situps - never gets worse sob, cp, palpitations, dizziness  Syncope in heat ovr 20 years ago, none since  Feels good energy level  Reports no progression of cardiac sx or fatigue in recent months/years  No new CP/SOB/dizziness/palpitations/syncope  No new leg swelling, PND, pillow orthopnea, unintentional weight changes  11/2021 OSH cards note:  Stable at 4 5-4 6 cm for the past 2-3 years  Follow up with CTS  Aware of ~5 cm threshold for surgery given his BAV  He will avoid heavy lifting and exertion  BP is controlled    Home meds:  • aspirin 81 MG EC tablet Take 81 mg by mouth daily  • cartilage/collagen/bor/hyalur (JOINT HEALTH ORAL) Take 120 capsules by mouth  • lisinopriL (PRINIVIL,ZESTRIL) 20 MG tablet TAKE 1 TABLET BY MOUTH DAILY 90 tablet 3   • magnesium oxide 200 mg magnesium chew Take by mouth  Magnesium w/ b6   • multivitamin (THERAGRAN) tab Take 1 tablet by mouth daily  • SAW PALMETTO FRUIT ORAL Take 1 tablet by mouth daily  • sildenafil, antihypertensive, (REVATIO) 20 mg tablet Use 3-5 tabs PO prn prior to sexual activity 30 tablet 5     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   He drinks 2-3 glasses of red wine nightly  He is originally from [place] [facility] [facility]  No current tobacco use, alcohol abuse, illicit drug use      Interval events:  Lengthy discussion with pt 9/1/22, regarding calcified BAV, Asc Ao aneurysm 4 6cm by outside CT scan (stable size), recent EST with hypotensive exercise response, dizziness, ischemic ECG  S/p bioAVR and CABGx1 10/4/22, doing well since  Warm/dry, euvolemic today; RRR on exam  Ambulating further, using stairs, doing all ADLs, mild SABILLON ongoing but improving    No new CP/dizziness/palpitations/syncope  No new leg swelling, PND, pillow orthopnea, unintentional weight changes, fatigue  No new fevers, chills, cough, nausea, vomiting, diarrhea, dysuri      Past Medical History:   Diagnosis Date   • Aortic stenosis    • Ascending aortic aneurysm    • Hyperlipidemia    • Hypertension    • Nonrheumatic aortic valve insufficiency    • Osteoarthritis    • Prostatitis        ROS:  10 point ROS negative except as specified in HPI    Allergies   Allergen Reactions   • Pollen Extract Nasal Congestion       Current Outpatient Medications   Medication Instructions   • amiodarone 200 mg tablet Take twice daily for 14 days, then decrease to once daily for an additional 14 days     • apixaban (ELIQUIS) 5 mg, Oral, 2 times daily   • ascorbic acid (VITAMIN C) 500 mg, Oral, Daily   • aspirin (ECOTRIN LOW STRENGTH) 81 mg, Oral, Daily   • atorvastatin (LIPITOR) 80 mg, Oral, Daily with dinner   • ferrous sulfate 325 mg, Oral, Daily with breakfast   • metoprolol succinate (TOPROL-XL) 75 mg, Oral, Daily   • Multiple Vitamin (multivitamin) tablet 1 tablet, Oral, Daily   • omeprazole (PRILOSEC) 20 mg, Oral, Daily        Social History     Socioeconomic History   • Marital status: /Civil Union     Spouse name: Not on file   • Number of children: Not on file   • Years of education: Not on file   • Highest education level: Not on file   Occupational History   • Not on file   Tobacco Use   • Smoking status: Never   • Smokeless tobacco: Never   Vaping Use   • Vaping Use: Never used   Substance and Sexual Activity   • Alcohol use: Not Currently     Alcohol/week: 28 0 standard drinks     Types: 14 Glasses of wine, 14 Shots of liquor per week     Comment: 2 small glasses of wine & 2 shots preston daily   • Drug use: Not Currently   • Sexual activity: Yes     Partners: Female   Other Topics Concern   • Not on file   Social History Narrative   • Not on file     Social Determinants of Health     Financial Resource Strain: Not on file   Food Insecurity: No Food Insecurity   • Worried About Running Out of Food in the Last Year: Never true   • Ran Out of Food in the Last Year: Never true   Transportation Needs: No Transportation Needs   • Lack of Transportation (Medical): No   • Lack of Transportation (Non-Medical): No   Physical Activity: Not on file   Stress: Not on file   Social Connections: Not on file   Intimate Partner Violence: Not on file   Housing Stability: Low Risk    • Unable to Pay for Housing in the Last Year: No   • Number of Places Lived in the Last Year: 1   • Unstable Housing in the Last Year: No       No family history on file      Physical Exam:  Vitals:    01/05/23 1310   BP: 132/84   BP Location: Left arm   Patient Position: Sitting   Cuff Size: Standard   Pulse: 83   SpO2: 97%   Weight: 85 2 kg (187 lb 12 8 oz)   Height: 5' 11" (1 803 m)       Constitutional: NAD, non toxic  Ears/nose/mouth/throat: atraumatic  CV: RRR, no JVD, no HJR  Resp: ctabl  GI: Soft, NTND  MSK: no swollen joints in exposed areas  Extr: No edema, WWP  Pysche: Normal affect  Neuro: appropriate in conversation  Skin: dry and intact in exposed areas    Labs & Results:    Lab Results   Component Value Date    WBC 10 00 10/10/2022    HGB 8 5 (L) 10/10/2022    HCT 25 3 (L) 10/10/2022    MCV 95 10/10/2022     10/10/2022     Lab Results   Component Value Date    SODIUM 136 10/10/2022    K 3 4 (L) 10/10/2022     10/10/2022    CO2 27 10/10/2022    BUN 18 10/10/2022    CREATININE 0 77 10/10/2022    GLUC 114 10/10/2022    CALCIUM 8 2 (L) 10/10/2022     No results found for: BNP   Lab Results   Component Value Date    CHOLESTEROL 238 (H) 09/23/2022     Lab Results   Component Value Date    HDL 75 09/23/2022 Lab Results   Component Value Date    TRIG 76 09/23/2022     Lab Results   Component Value Date    Klaus 163 09/23/2022       Counseling / Coordination of Care  Time spent today 37 minutes  Greater than 50% of total time was spent with the patient and / or family counseling and / or coordination of care  We discussed diagnoses, most recent studies and any changes in treatment  Thank you for the opportunity to participate in the care of this patient      Galen Hardy MD  Attending Physician  Advanced Heart Failure and Transplant Cardiology  [facility]

## 2023-01-05 ENCOUNTER — TELEPHONE (OUTPATIENT)
Dept: LAB | Facility: HOSPITAL | Age: 73
End: 2023-01-05

## 2023-01-05 ENCOUNTER — LAB (OUTPATIENT)
Dept: LAB | Age: 73
End: 2023-01-05

## 2023-01-05 ENCOUNTER — APPOINTMENT (OUTPATIENT)
Dept: LAB | Facility: HOSPITAL | Age: 73
End: 2023-01-05

## 2023-01-05 ENCOUNTER — OFFICE VISIT (OUTPATIENT)
Dept: CARDIOLOGY CLINIC | Facility: CLINIC | Age: 73
End: 2023-01-05

## 2023-01-05 ENCOUNTER — CLINICAL SUPPORT (OUTPATIENT)
Dept: CARDIAC REHAB | Age: 73
End: 2023-01-05

## 2023-01-05 VITALS
HEART RATE: 83 BPM | DIASTOLIC BLOOD PRESSURE: 84 MMHG | WEIGHT: 187.8 LBS | OXYGEN SATURATION: 97 % | HEIGHT: 71 IN | BODY MASS INDEX: 26.29 KG/M2 | SYSTOLIC BLOOD PRESSURE: 132 MMHG

## 2023-01-05 DIAGNOSIS — Z95.1 S/P CABG X 1: Primary | ICD-10-CM

## 2023-01-05 DIAGNOSIS — Z95.1 S/P CABG X 1: ICD-10-CM

## 2023-01-05 DIAGNOSIS — I48.91 ATRIAL FIBRILLATION, UNSPECIFIED TYPE (HCC): ICD-10-CM

## 2023-01-05 DIAGNOSIS — Q23.1 BICUSPID AORTIC VALVE: ICD-10-CM

## 2023-01-05 DIAGNOSIS — Z95.2 S/P AVR (AORTIC VALVE REPLACEMENT): Primary | ICD-10-CM

## 2023-01-05 DIAGNOSIS — Z95.2 S/P AVR (AORTIC VALVE REPLACEMENT): ICD-10-CM

## 2023-01-05 DIAGNOSIS — I97.89 POSTOPERATIVE ATRIAL FIBRILLATION (HCC): ICD-10-CM

## 2023-01-05 DIAGNOSIS — I48.91 POSTOPERATIVE ATRIAL FIBRILLATION (HCC): ICD-10-CM

## 2023-01-05 DIAGNOSIS — Z00.00 HEALTHCARE MAINTENANCE: ICD-10-CM

## 2023-01-05 LAB
INR PPP: 2.28 (ref 0.84–1.19)
PROTHROMBIN TIME: 25.4 SECONDS (ref 11.6–14.5)

## 2023-01-05 RX ORDER — MULTIVITAMIN
1 TABLET ORAL DAILY
COMMUNITY

## 2023-01-05 RX ORDER — METOPROLOL SUCCINATE 50 MG/1
75 TABLET, EXTENDED RELEASE ORAL DAILY
Qty: 45 TABLET | Refills: 5 | Status: SHIPPED | OUTPATIENT
Start: 2023-01-05 | End: 2023-01-06

## 2023-01-05 RX ORDER — ATORVASTATIN CALCIUM 80 MG/1
80 TABLET, FILM COATED ORAL
Qty: 30 TABLET | Refills: 5 | Status: SHIPPED | OUTPATIENT
Start: 2023-01-05 | End: 2023-01-06

## 2023-01-05 NOTE — TELEPHONE ENCOUNTER
1/5 - spoke to pt - phleb may not be there by the time he needs - he is going to the Dr and will get his bloodwork done at [address] [facility] before his appt

## 2023-01-06 ENCOUNTER — ANTICOAG VISIT (OUTPATIENT)
Dept: CARDIOLOGY CLINIC | Facility: CLINIC | Age: 73
End: 2023-01-06

## 2023-01-06 ENCOUNTER — APPOINTMENT (OUTPATIENT)
Dept: CARDIAC REHAB | Age: 73
End: 2023-01-06

## 2023-01-06 ENCOUNTER — TELEPHONE (OUTPATIENT)
Dept: CARDIOLOGY CLINIC | Facility: CLINIC | Age: 73
End: 2023-01-06

## 2023-01-06 DIAGNOSIS — I97.89 POSTOPERATIVE ATRIAL FIBRILLATION (HCC): Primary | ICD-10-CM

## 2023-01-06 DIAGNOSIS — I48.91 POSTOPERATIVE ATRIAL FIBRILLATION (HCC): Primary | ICD-10-CM

## 2023-01-06 LAB
ALBUMIN SERPL BCP-MCNC: 3.8 G/DL (ref 3.5–5)
ALP SERPL-CCNC: 43 U/L (ref 46–116)
ALT SERPL W P-5'-P-CCNC: 28 U/L (ref 12–78)
ANION GAP SERPL CALCULATED.3IONS-SCNC: 6 MMOL/L (ref 4–13)
AST SERPL W P-5'-P-CCNC: 14 U/L (ref 5–45)
BILIRUB SERPL-MCNC: 0.37 MG/DL (ref 0.2–1)
BUN SERPL-MCNC: 19 MG/DL (ref 5–25)
CALCIUM SERPL-MCNC: 8.9 MG/DL (ref 8.3–10.1)
CHLORIDE SERPL-SCNC: 104 MMOL/L (ref 96–108)
CO2 SERPL-SCNC: 25 MMOL/L (ref 21–32)
CREAT SERPL-MCNC: 0.87 MG/DL (ref 0.6–1.3)
GFR SERPL CREATININE-BSD FRML MDRD: 85 ML/MIN/1.73SQ M
GLUCOSE SERPL-MCNC: 95 MG/DL (ref 65–140)
POTASSIUM SERPL-SCNC: 4.1 MMOL/L (ref 3.5–5.3)
PROT SERPL-MCNC: 6.8 G/DL (ref 6.4–8.4)
SODIUM SERPL-SCNC: 135 MMOL/L (ref 135–147)
TSH SERPL DL<=0.05 MIU/L-ACNC: 3.41 UIU/ML (ref 0.45–4.5)

## 2023-01-06 RX ORDER — METOPROLOL SUCCINATE 50 MG/1
TABLET, EXTENDED RELEASE ORAL
Qty: 135 TABLET | Refills: 3 | Status: SHIPPED | OUTPATIENT
Start: 2023-01-06

## 2023-01-06 RX ORDER — ATORVASTATIN CALCIUM 80 MG/1
TABLET, FILM COATED ORAL
Qty: 90 TABLET | Refills: 3 | Status: SHIPPED | OUTPATIENT
Start: 2023-01-06

## 2023-01-06 NOTE — RESULT ENCOUNTER NOTE
Please notify pt that his recent labs are normal     Please ask him to verify one additional time that he has not been taking coumadin recently  Please ascertain when he stopped coumadin, if he can remember  Please ask him if he was able to get the new eliquis (I believe he was as he would have already called us if there was a price issue)        Thanks!    - Angel Zheng

## 2023-01-06 NOTE — TELEPHONE ENCOUNTER
----- Message from Marcy Garcia MD sent at 1/5/2023  2:11 PM EST -----  Regarding: Please assist in obtaining auth for eliquis Jessica bid  Thanks!    - Terri Lemons

## 2023-01-09 ENCOUNTER — TELEPHONE (OUTPATIENT)
Dept: CARDIOLOGY CLINIC | Facility: CLINIC | Age: 73
End: 2023-01-09

## 2023-01-09 ENCOUNTER — CLINICAL SUPPORT (OUTPATIENT)
Dept: CARDIAC REHAB | Age: 73
End: 2023-01-09

## 2023-01-09 DIAGNOSIS — Z95.1 S/P CABG X 1: ICD-10-CM

## 2023-01-09 DIAGNOSIS — Z95.2 S/P AVR (AORTIC VALVE REPLACEMENT): ICD-10-CM

## 2023-01-09 NOTE — TELEPHONE ENCOUNTER
Called patient and gave results  ----- Message from Abner Henderson MD sent at 1/6/2023  5:06 PM EST -----  Please notify pt that his recent labs are normal     Please ask him to verify one additional time that he has not been taking coumadin recently  Please ascertain when he stopped coumadin, if he can remember  Please ask him if he was able to get the new eliquis (I believe he was as he would have already called us if there was a price issue)        Thanks!    - Cierra Boyd

## 2023-01-11 ENCOUNTER — APPOINTMENT (OUTPATIENT)
Dept: CARDIAC REHAB | Age: 73
End: 2023-01-11

## 2023-01-11 NOTE — PROGRESS NOTES
Cardiac Rehabilitation Plan of Care   60 Day Reassessment          Today's date: 2023   # of Exercise Sessions Completed: 17  Patient name: Cher Champion      : 1950  Age: 74 y o  MRN: [id number]  Referring Physician: Elson Osler, PA-C  Cardiologist: Irwin Yee MD  Provider: Arianna Lau  Clinician: Destiny Blood MS, CEP      Dx:   Encounter Diagnoses   Name Primary? • S/P CABG x 1    • S/P AVR (aortic valve replacement)      Date of onset: 10/4/22      SUMMARY OF PROGRESS:   23: Zurdo Thompson remains compliant attending rehab 3x/wk, when scheduling allows  He tolerates 40-44 mins at 4 4-5 0 METS plus weight training  Resting //92 with appropriate response to exercise reaching 150//90  He often arrives hypertensive, sometimes reaching as high as 170/100 at rest  He often requires longer resting periods prior to beginning exercise to allow for BP to normalize  He is a  and often arrives after commuting from work at [employer] which could be contributing to his higher BP readings  NSR on tele with 1AVB and rare PACs, m/f PVCS, short runs of bigeminy and rare couplets observed  RHR 68-80 ExHR 102-115  He maintains a home exercise 7 days/wk which includes 30 mins of daily walking with decent incline  No cardiac complaints while in rehab  Patient admits to 100% medication compliance  He is progressing toward wt loss goals with a loss of 5 pounds  Current weight 182  Patient has been working on  dietary modifications with the goal of rare red/processed meats, low fat dairy, reduced added sugars and refined flours  He states he has cut down his consumption of salami & cheese  The patient is a non-smoker  Patient reports excellent social/emotional support  Patient attends group educational classes on cardiac risk factor modification  His exercise program will be progressed as tolerated to maintain RPE 4-6   The patient has the following personal goals he hopes to achieved by discharge:  dietary modifications including reducing intake of cheese and salami, 5lb wt loss, return to [employer] , 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  All goals are currently in progress  He reports increased confidence with exertion, improved endurance, stamina and energy and less stress  Pt will continue to be educated on lifestyle modifications and encouraged to supplement with a home exercise program to reach his goals  Will continue to monitor  12/15: Martin Bhagat is compliant attending cardiac rehab exercise sessions 3x/wk  He tolerates 40 mins at 3 1 - 4 9 METs plus wt training  He is tolerating progression of intensity levels to maintain RPE 4-6  Resting BP   134/80 - 148/90 with appropriate response to exercise reaching 140/84- 154/80  NSR on tele with 1AVB and rare PACs observed  On 12/14, a 5 beat SVT noted during exercise  RHR 77 - 92 ExHR 94 - 124  He maintains a home exercise 7 days/wk which includes 30 mins of daily walking with decent incline  No cardiac complaints while in rehab  He did report pain, occurring outside of rehab, that precipitates randomly located in the center of his chest describing it as less than 1 on 1-10 scale  Patient admits to 100% medication compliance  Lopressor 25mg QD added to pt's med list as of 12/8  He states that his warfarin is still being adjusted  He is progressing toward wt loss goals with a loss of 1 pounds  Patient has been working on  dietary modifications with the goal of rare red/processed meats, low fat dairy, reduced added sugars and refined flours  He states he has cut down his consumption of salami & cheese  The patient is a non-smoker  When addressed, the patient denies depression/anxiety  Patient reports excellent social/emotional support  Patient attends group educational classes on cardiac risk factor modification    His exercise program will be progressed as tolerated to maintain RPE 4-6  The patient has the following personal goals he hopes to achieved by discharge:  dietary modifications including reducing intake of cheese and salami, 5lb wt loss, return to [employer] , 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  All goals are currently in progress  He reports increased confidence with exertion, improved endurance, stamina and energy and less stress  Pt will continue to be educated on lifestyle modifications and encouraged to supplement with a home exercise program to reach the following goals in the next 30 days: continue to lose weight and drink more water  11/15: Today is Jose's initial evaluation to begin Cardiac Rehab post CABG x 1/AVR on October 4, 2022  The patient does currently follow a formal exercise program at home, including daily 30 min walks which includes a decent incline  He has resumed light to moderate ADLs following sternal restrictions and without fatigue and tolerating them well  Depression screening using the PHQ-9 interprets the patient's score of  1  as  1-4 = Minimal Depression  RAÚL-7 screening tool for anxiety suggests 1  0-4  = Not anxious  When addressed, the patient denies having depression/anxiety  Patient reports excellent social/emotional support from his wife and community  Information to begin using [employer] was provided as well as contact information for counseling through [employer]  PHQ-9 score will be reassessed in 30 days  The patient is a non-smoker  Patient admits to 100% medication compliance  Patient reports the following physical limitations: R hip OA  The patient completed an initial submaximal TM ETT  The patient completed 1 minutes of stage IV (5 8METs) with test termination of RPE 6  Resting  /90 with appropriate hemodynamic response to exercise reaching 194/92  Patient denied symptoms during exercise  Telemetry revealed NSR, occ PACs    Patient was counseled on exercise guidelines to achieve a minimum of 150 mins/wk of moderate intensity (RPE 4-6) exercise and encouraged to add 1-2 days of exercise on opposite days of cardiac rehab as tolerated  We discussed current dietary habits and goals of heart healthy eating for lipid management and weight loss  Patient's goals include: dietary modifications including reducing intake of cheese and salami, 5lb wt loss, return to [employer] , 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  The patient's CAD risk factors include:  stress, obesity/overweight, hypertension and hyperlipidemia  His education will focus on lifestyle modification/education specific to His needs  Patient will attend group education classes on heart healthy eating, reading food labels, stress management, risk factor reduction, understanding heart disease and common heart medications  Patient will attend 35 monitored exercise sessions, 3x/wk for 12-18 weeks beginning 2022         Medication compliance: Yes   Comments: Pt reports to be compliant with medications  Fall Risk: Low   Comments: Ambulates with a steady gait with no assist device and Denies a fall in the past 6 months    EKG Interpretation: NSR, occ PACs      EXERCISE ASSESSMENT and PLAN    Exercise Prescription:      Frequency: 3 days/week   Supplement with home exercise 2+ days/wk as tolerated       Minutes: 40-44       METS: 4 4-5 0          HR:    RPE: 4-5         Modalities: Treadmill, Airdyne bike, UBE, Lifecycle, Elliptical and Rower      30 Day Goals for Exercise Progression:    Frequency: 3 days/week of cardiac rehab       Supplement with home exercise 2+ days/wk as tolerated    Minutes: 45                             >150 mins/wk of moderate intensity exercise   METS: 4 5-5 5   HR: 100-125    RPE: 4-6   Modalities: Treadmill, Airdyne bike, Lifecycle, Elliptical and Rower    Strength trainin-3 days / week  12-15 repetitions  1-2 sets per modality    Modalities: Leg Press, Chest Press, Pull Downs and Lateral Raise    Home Exercise: Type: walking, Frequency: 7 days/week, Duration: 30 mins    Goals: 10% improvement in functional capacity - based on max METs achieved in fitness assessment, Increase in exercise capacity by 40% - based on peak METs tolerated in cardiac rehab exercise session, Exercise 5 days/wk, >150mins/wk of moderate intensity exercise, Resume ADLs with increased strength, Return to work unrestricted and return to regular exercise at the gym    Progression Toward Goals:  Pt is progressing and showing improvement  toward the following goals:  exercsie 5days/wk, resume ADL's, returned to work unrestricted   , Will continue to educate and progress as tolerated  Education: benefit of exercise for CAD risk factors, AHA guidelines to achieve >150 mins/wk of moderate exercise, RPE scale and physical activity/exercise in extreme weather conditions   Plan:education on home exercise guidelines, home exercise 30+ mins 2 days opposite CR and Education class: Risk Factors for Heart Disease  Readiness to change: Action:  (Changing behavior)      NUTRITION ASSESSMENT AND PLAN    Weight control:    Starting weight: 187 7   Current weight:   182  Waist circumference:    Startin   Current:      Diabetes: N/A  A1c: 5 9    last measured: 22    Lipid management: Discussed diet and lipid management and Last lipid profile 22  Chol 238  TRG 76  HDL 75      Goals:reduced BMI to < 25, LDL <100, CHOL <200, Improved Rate Your Plate score  >74, Wt  loss 1-2 ppw,  goal of 180 lbs  , choose lean meat (93-95%), eliminate processed meats, reduce portion sizes of meat to 3oz or less, eat 3 or more servings of whole grains a day, seldom eat or choose low fat ice-cream, fruit juice bars or frozen yogurt  and daily saturated fat intake <7%/13g    Progression Toward Goals: Pt is progressing and showing improvement  toward the following goals:  reduced BMI to < 25, LDL <100, CHOL <200, Improved Rate Your Plate score  >37, Wt  loss 1-2 ppw,  goal of 180 lbs  , choose lean meat (93-95%), eliminate processed meats, reduce portion sizes of meat to 3oz or less, eat 3 or more servings of whole grains a day, seldom eat or choose low fat ice-cream, fruit juice bars or frozen yogurt  and daily saturated fat intake <7%/13g   , Will continue to educate and progress as tolerated  Education: heart healthy eating  hydration  nutrition for  lipid management  wt  loss   education class: Heart Healthy Eating  education class:  Label Reading  Plan: switch to low fat cheeses, reduce portion sizes, reduce red meat 1x/wk, switch to skim or 1% milk, avoid processed foods, drink more water, learn how to read food labels and keep added daily sugar <25g/day  Readiness to change: Action:  (Changing behavior)      PSYCHOSOCIAL ASSESSMENT AND PLAN    Emotional:  Depression assessment:  PHQ-9 = 1  1-4 = Minimal Depression            Anxiety measure:  RAÚL-7 = 1  0-4  = Not anxious  Self-reported stress level:  6 f/u: 12/14 pt reports stress is reducing 3/10  Social support: Excellent and Patient reports excellent emotional/social support from wife and their community    Goals:  Reduce perceived stress to 1-3/10, Social Activities in [place] Score < 3, increased energy and Handle anger/frustration better    Progression Toward Goals: Pt is progressing and showing improvement  toward the following goals:  reducing stress, increased energy  , Will continue to educate and progress as tolerated      Education: benefits of a positive support system and stress management techniques  Plan: Class: Stress and Your Health, Class: Relaxation and Exercise  Readiness to change: Action:  (Changing behavior)      OTHER CORE COMPONENTS     Tobacco:   Social History     Tobacco Use   Smoking Status Never   Smokeless Tobacco Never       Tobacco Use Intervention:   N/A:  Patient is a non-smoker     Anginal Symptoms:  None   NTG use: No prescription    Blood pressure:    Restin//92   Exercise: 150//90    Goals: consistent BP < 130/80, reduced dietary sodium <2300mg, moderate intensity exercise >150 mins/wk and medication compliance    Progression Toward Goals: Pt is progressing and showing improvement  toward the following goals:  reduced dietary sodium <2300mg, moderate intensity exercise >150 mins/wk and medication compliance   , Will continue to educate and progress as tolerated      Education:  understanding high blood pressure and it's relationship to CAD and low sodium diet and HTN  Plan: Class: Understanding Heart Disease, Class: Common Heart Medications, Avoid Processed foods, engage in regular exercise, eliminate salt shaker at the table, use salt substitutes and check labels for sodium content  Readiness to change: Action:  (Changing behavior)

## 2023-01-12 ENCOUNTER — APPOINTMENT (OUTPATIENT)
Dept: LAB | Facility: HOSPITAL | Age: 73
End: 2023-01-12

## 2023-01-12 ENCOUNTER — ANTICOAG VISIT (OUTPATIENT)
Dept: CARDIOLOGY CLINIC | Facility: CLINIC | Age: 73
End: 2023-01-12

## 2023-01-12 DIAGNOSIS — I48.91 POSTOPERATIVE ATRIAL FIBRILLATION (HCC): Primary | ICD-10-CM

## 2023-01-12 DIAGNOSIS — Z95.2 HEART VALVE REPLACED BY TRANSPLANT: Primary | ICD-10-CM

## 2023-01-12 DIAGNOSIS — I48.91 POSTOPERATIVE ATRIAL FIBRILLATION (HCC): ICD-10-CM

## 2023-01-12 DIAGNOSIS — I97.89 POSTOPERATIVE ATRIAL FIBRILLATION (HCC): Primary | ICD-10-CM

## 2023-01-12 DIAGNOSIS — I97.89 POSTOPERATIVE ATRIAL FIBRILLATION (HCC): ICD-10-CM

## 2023-01-12 LAB
ALBUMIN SERPL BCP-MCNC: 3.8 G/DL (ref 3.5–5)
ALP SERPL-CCNC: 53 U/L (ref 46–116)
ALT SERPL W P-5'-P-CCNC: 35 U/L (ref 12–78)
ANION GAP SERPL CALCULATED.3IONS-SCNC: 4 MMOL/L (ref 4–13)
AST SERPL W P-5'-P-CCNC: 23 U/L (ref 5–45)
BILIRUB SERPL-MCNC: 0.43 MG/DL (ref 0.2–1)
BUN SERPL-MCNC: 16 MG/DL (ref 5–25)
CALCIUM SERPL-MCNC: 9.3 MG/DL (ref 8.3–10.1)
CHLORIDE SERPL-SCNC: 104 MMOL/L (ref 96–108)
CO2 SERPL-SCNC: 31 MMOL/L (ref 21–32)
CREAT SERPL-MCNC: 0.97 MG/DL (ref 0.6–1.3)
GFR SERPL CREATININE-BSD FRML MDRD: 76 ML/MIN/1.73SQ M
GLUCOSE SERPL-MCNC: 87 MG/DL (ref 65–140)
INR PPP: 2.14 (ref 0.84–1.19)
POTASSIUM SERPL-SCNC: 5 MMOL/L (ref 3.5–5.3)
PROT SERPL-MCNC: 7.4 G/DL (ref 6.4–8.4)
PROTHROMBIN TIME: 24.2 SECONDS (ref 11.6–14.5)
SODIUM SERPL-SCNC: 139 MMOL/L (ref 135–147)
TSH SERPL DL<=0.05 MIU/L-ACNC: 3.18 UIU/ML (ref 0.45–4.5)

## 2023-01-13 ENCOUNTER — CLINICAL SUPPORT (OUTPATIENT)
Dept: CARDIAC REHAB | Age: 73
End: 2023-01-13

## 2023-01-13 DIAGNOSIS — Z95.1 S/P CABG X 1: ICD-10-CM

## 2023-01-13 DIAGNOSIS — Z95.2 S/P AVR (AORTIC VALVE REPLACEMENT): ICD-10-CM

## 2023-01-16 ENCOUNTER — APPOINTMENT (OUTPATIENT)
Dept: CARDIAC REHAB | Age: 73
End: 2023-01-16

## 2023-01-16 ENCOUNTER — CLINICAL SUPPORT (OUTPATIENT)
Dept: CARDIAC REHAB | Age: 73
End: 2023-01-16

## 2023-01-16 DIAGNOSIS — Z95.1 S/P CABG X 1: ICD-10-CM

## 2023-01-16 DIAGNOSIS — Z95.2 S/P AVR (AORTIC VALVE REPLACEMENT): ICD-10-CM

## 2023-01-17 ENCOUNTER — CLINICAL SUPPORT (OUTPATIENT)
Dept: CARDIAC REHAB | Age: 73
End: 2023-01-17

## 2023-01-17 DIAGNOSIS — Z95.1 S/P CABG X 1: Primary | ICD-10-CM

## 2023-01-17 DIAGNOSIS — Z95.2 S/P AVR (AORTIC VALVE REPLACEMENT): ICD-10-CM

## 2023-01-18 ENCOUNTER — CLINICAL SUPPORT (OUTPATIENT)
Dept: CARDIAC REHAB | Age: 73
End: 2023-01-18

## 2023-01-18 DIAGNOSIS — Z95.2 S/P AVR (AORTIC VALVE REPLACEMENT): ICD-10-CM

## 2023-01-18 DIAGNOSIS — Z95.1 S/P CABG X 1: ICD-10-CM

## 2023-01-20 ENCOUNTER — APPOINTMENT (OUTPATIENT)
Dept: CARDIAC REHAB | Age: 73
End: 2023-01-20

## 2023-01-23 ENCOUNTER — APPOINTMENT (OUTPATIENT)
Dept: CARDIAC REHAB | Age: 73
End: 2023-01-23

## 2023-01-24 ENCOUNTER — APPOINTMENT (OUTPATIENT)
Dept: CARDIAC REHAB | Age: 73
End: 2023-01-24

## 2023-01-25 ENCOUNTER — APPOINTMENT (OUTPATIENT)
Dept: CARDIAC REHAB | Age: 73
End: 2023-01-25

## 2023-01-27 ENCOUNTER — APPOINTMENT (OUTPATIENT)
Dept: CARDIAC REHAB | Age: 73
End: 2023-01-27

## 2023-01-30 ENCOUNTER — APPOINTMENT (OUTPATIENT)
Dept: CARDIAC REHAB | Age: 73
End: 2023-01-30

## 2023-01-31 ENCOUNTER — CLINICAL SUPPORT (OUTPATIENT)
Dept: CARDIAC REHAB | Age: 73
End: 2023-01-31

## 2023-01-31 DIAGNOSIS — Z95.1 S/P CABG X 1: Primary | ICD-10-CM

## 2023-01-31 DIAGNOSIS — Z95.2 S/P AVR (AORTIC VALVE REPLACEMENT): ICD-10-CM

## 2023-02-01 ENCOUNTER — CLINICAL SUPPORT (OUTPATIENT)
Dept: CARDIAC REHAB | Age: 73
End: 2023-02-01

## 2023-02-01 DIAGNOSIS — Z95.1 S/P CABG X 1: ICD-10-CM

## 2023-02-01 DIAGNOSIS — Z95.2 S/P AVR (AORTIC VALVE REPLACEMENT): ICD-10-CM

## 2023-02-03 ENCOUNTER — APPOINTMENT (OUTPATIENT)
Dept: CARDIAC REHAB | Age: 73
End: 2023-02-03

## 2023-02-06 ENCOUNTER — APPOINTMENT (OUTPATIENT)
Dept: CARDIAC REHAB | Age: 73
End: 2023-02-06

## 2023-02-07 ENCOUNTER — CLINICAL SUPPORT (OUTPATIENT)
Dept: CARDIAC REHAB | Age: 73
End: 2023-02-07

## 2023-02-07 DIAGNOSIS — Z95.2 S/P AVR (AORTIC VALVE REPLACEMENT): ICD-10-CM

## 2023-02-07 DIAGNOSIS — Z95.1 S/P CABG X 1: Primary | ICD-10-CM

## 2023-02-08 ENCOUNTER — CLINICAL SUPPORT (OUTPATIENT)
Dept: CARDIAC REHAB | Age: 73
End: 2023-02-08

## 2023-02-08 DIAGNOSIS — Z95.1 S/P CABG X 1: ICD-10-CM

## 2023-02-08 DIAGNOSIS — Z95.2 S/P AVR (AORTIC VALVE REPLACEMENT): ICD-10-CM

## 2023-02-10 ENCOUNTER — CLINICAL SUPPORT (OUTPATIENT)
Dept: CARDIAC REHAB | Age: 73
End: 2023-02-10

## 2023-02-10 DIAGNOSIS — Z95.2 S/P AVR (AORTIC VALVE REPLACEMENT): ICD-10-CM

## 2023-02-10 DIAGNOSIS — Z95.1 S/P CABG X 1: ICD-10-CM

## 2023-02-14 ENCOUNTER — CLINICAL SUPPORT (OUTPATIENT)
Dept: CARDIAC REHAB | Age: 73
End: 2023-02-14

## 2023-02-14 DIAGNOSIS — Z95.1 S/P CABG X 1: Primary | ICD-10-CM

## 2023-02-14 DIAGNOSIS — Z95.2 S/P AVR (AORTIC VALVE REPLACEMENT): ICD-10-CM

## 2023-02-15 ENCOUNTER — CLINICAL SUPPORT (OUTPATIENT)
Dept: CARDIAC REHAB | Age: 73
End: 2023-02-15

## 2023-02-15 DIAGNOSIS — Z95.1 S/P CABG X 1: ICD-10-CM

## 2023-02-15 DIAGNOSIS — Z95.2 S/P AVR (AORTIC VALVE REPLACEMENT): ICD-10-CM

## 2023-02-15 NOTE — PROGRESS NOTES
Cardiac Rehabilitation Plan of Care   90 Day Reassessment          Today's date: 2/15/2023   # of Exercise Sessions Completed: 27  Patient name: Edyta Oro      : 1950  Age: 74 y o  MRN: [id number]  Referring Physician: Jake Quezada PA-C  Cardiologist: Sadie Garza MD  Provider: Ashwin Quinteros  Clinician: Alex Grant, MS, CEP, CCRP      Dx:   Encounter Diagnoses   Name Primary? 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[employer] and has missed a few sessions with work commitments  He has been supplementing with home walking at least 30 mins  He tolerates 40-44 mins at 4 4-6 0 METS plus weight training  Resting /82 - 120/82(one very high resting BP - 188/82) with appropriate response to exercise reaching 144/72 - 160/82  NSR on tele with 1AVB and rare PACs, m/f PVCS, short runs of bigeminy and rare couplets observed  RHR 68-80 ExHR 110-122  No cardiac complaints while in rehab  Patient admits to 100% medication compliance  His weight is creeping back up and he is now 3lbs higher then his starting weight  Current weight 190  Patient reports he is working on  dietary modifications with the goal of rare red/processed meats, low fat dairy, reduced added sugars and refined flours  He states he has cut down his consumption of salami & cheese but still eats them regularly  The patient is a non-smoker  Patient reports excellent social/emotional support  Patient attends group educational classes on cardiac risk factor modification  His exercise program will be progressed as tolerated to maintain RPE 4-6   The patient has the following personal goals he hopes to achieved by discharge:  dietary modifications including reducing intake of cheese and salami, 5lb wt loss, return to [employer] , 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  All goals are currently in progress  He reports increased confidence with exertion, improved endurance, stamina and energy and less stress  Pt will continue to be educated on lifestyle modifications and encouraged to supplement with a home exercise program to reach his goals  Will continue to monitor  1/11/23: Ashvin Perry remains compliant attending rehab 3x/wk, when scheduling allows  He tolerates 40-44 mins at 4 4-5 0 METS plus weight training  Resting //92 with appropriate response to exercise reaching 150//90  He often arrives hypertensive, sometimes reaching as high as 170/100 at rest  He often requires longer resting periods prior to beginning exercise to allow for BP to normalize  He is a  and often arrives after commuting from work at [employer] which could be contributing to his higher BP readings  NSR on tele with 1AVB and rare PACs, m/f PVCS, short runs of bigeminy and rare couplets observed  RHR 68-80 ExHR 102-115  He maintains a home exercise 7 days/wk which includes 30 mins of daily walking with decent incline  No cardiac complaints while in rehab  Patient admits to 100% medication compliance  He is progressing toward wt loss goals with a loss of 5 pounds  Current weight 182  Patient has been working on  dietary modifications with the goal of rare red/processed meats, low fat dairy, reduced added sugars and refined flours  He states he has cut down his consumption of salami & cheese  The patient is a non-smoker  Patient reports excellent social/emotional support  Patient attends group educational classes on cardiac risk factor modification  His exercise program will be progressed as tolerated to maintain RPE 4-6   The patient has the following personal goals he hopes to achieved by discharge:  dietary modifications including reducing intake of cheese and salami, 5lb wt loss, return to [employer], 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  All goals are currently in progress  He reports increased confidence with exertion, improved endurance, stamina and energy and less stress  Pt will continue to be educated on lifestyle modifications and encouraged to supplement with a home exercise program to reach his goals  Will continue to monitor  12/15: Justo Boyd is compliant attending cardiac rehab exercise sessions 3x/wk  He tolerates 40 mins at 3 1 - 4 9 METs plus wt training  He is tolerating progression of intensity levels to maintain RPE 4-6  Resting BP   134/80 - 148/90 with appropriate response to exercise reaching 140/84- 154/80  NSR on tele with 1AVB and rare PACs observed  On 12/14, a 5 beat SVT noted during exercise  RHR 77 - 92 ExHR 94 - 124  He maintains a home exercise 7 days/wk which includes 30 mins of daily walking with decent incline  No cardiac complaints while in rehab  He did report pain, occurring outside of rehab, that precipitates randomly located in the center of his chest describing it as less than 1 on 1-10 scale  Patient admits to 100% medication compliance  Lopressor 25mg QD added to pt's med list as of 12/8  He states that his warfarin is still being adjusted  He is progressing toward wt loss goals with a loss of 1 pounds  Patient has been working on  dietary modifications with the goal of rare red/processed meats, low fat dairy, reduced added sugars and refined flours  He states he has cut down his consumption of salami & cheese  The patient is a non-smoker  When addressed, the patient denies depression/anxiety  Patient reports excellent social/emotional support  Patient attends group educational classes on cardiac risk factor modification  His exercise program will be progressed as tolerated to maintain RPE 4-6   The patient has the following personal goals he hopes to achieved by discharge:  dietary modifications including reducing intake of cheese and salami, 5lb wt loss, return to [employer] , 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  All goals are currently in progress  He reports increased confidence with exertion, improved endurance, stamina and energy and less stress  Pt will continue to be educated on lifestyle modifications and encouraged to supplement with a home exercise program to reach the following goals in the next 30 days: continue to lose weight and drink more water  11/15: Today is Joes's initial evaluation to begin Cardiac Rehab post CABG x 1/AVR on October 4, 2022  The patient does currently follow a formal exercise program at home, including daily 30 min walks which includes a decent incline  He has resumed light to moderate ADLs following sternal restrictions and without fatigue and tolerating them well  Depression screening using the PHQ-9 interprets the patient's score of  1  as  1-4 = Minimal Depression  RAÚL-7 screening tool for anxiety suggests 1  0-4  = Not anxious  When addressed, the patient denies having depression/anxiety  Patient reports excellent social/emotional support from his wife and community  Information to begin using [employer] was provided as well as contact information for counseling through [facility]  PHQ-9 score will be reassessed in 30 days  The patient is a non-smoker  Patient admits to 100% medication compliance  Patient reports the following physical limitations: R hip OA  The patient completed an initial submaximal TM ETT  The patient completed 1 minutes of stage IV (5 8METs) with test termination of RPE 6  Resting  /90 with appropriate hemodynamic response to exercise reaching 194/92  Patient denied symptoms during exercise  Telemetry revealed NSR, occ PACs    Patient was counseled on exercise guidelines to achieve a minimum of 150 mins/wk of moderate intensity (RPE 4-6) exercise and encouraged to add 1-2 days of exercise on opposite days of cardiac rehab as tolerated  We discussed current dietary habits and goals of heart healthy eating for lipid management and weight loss  Patient's goals include: dietary modifications including reducing intake of cheese and salami, 5lb wt loss, return to [employer] , 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  The patient's CAD risk factors include:  stress, obesity/overweight, hypertension and hyperlipidemia  His education will focus on lifestyle modification/education specific to His needs  Patient will attend group education classes on heart healthy eating, reading food labels, stress management, risk factor reduction, understanding heart disease and common heart medications  Patient will attend 35 monitored exercise sessions, 3x/wk for 12-18 weeks beginning 2022         Medication compliance: Yes   Comments: Pt reports to be compliant with medications  Fall Risk: Low   Comments: Ambulates with a steady gait with no assist device and Denies a fall in the past 6 months    EKG Interpretation: NSR, occ PACs      EXERCISE ASSESSMENT and PLAN    Exercise Prescription:      Frequency: 3 days/week   Supplement with home exercise 2+ days/wk as tolerated       Minutes: 40-44       METS: 4 8-6 0         HR:    RPE: 4-5         Modalities: Treadmill, Airdyne bike, UBE, Lifecycle, Elliptical and Rower      30 Day Goals for Exercise Progression:    Frequency: 3 days/week of cardiac rehab       Supplement with home exercise 2+ days/wk as tolerated    Minutes: 45                             >150 mins/wk of moderate intensity exercise   METS: 4 5-5 5   HR: 100-125    RPE: 4-6   Modalities: Treadmill, Airdyne bike, Lifecycle, Elliptical and Rower    Strength trainin-3 days / week  12-15 repetitions  1-2 sets per modality    Modalities: Leg Press, Chest Press, Pull Downs and [address] Exercise: Type: walking, Frequency: 7 days/week, Duration: 30 mins    Goals: 10% improvement in functional capacity - based on max METs achieved in fitness assessment, Increase in exercise capacity by 40% - based on peak METs tolerated in cardiac rehab exercise session, Exercise 5 days/wk, >150mins/wk of moderate intensity exercise, Resume ADLs with increased strength, Return to work unrestricted and return to regular exercise at the gym    Progression Toward Goals:  Pt is progressing and showing improvement  toward the following goals:  exercsie 5days/wk, resume ADL's, returned to work unrestricted   , Will continue to educate and progress as tolerated  Education: benefit of exercise for CAD risk factors, AHA guidelines to achieve >150 mins/wk of moderate exercise, RPE scale and physical activity/exercise in extreme weather conditions   Plan:education on home exercise guidelines, home exercise 30+ mins 2 days opposite CR and Education class: Risk Factors for Heart Disease  Readiness to change: Action:  (Changing behavior)      NUTRITION ASSESSMENT AND PLAN    Weight control:    Starting weight: 187 7   Current weight:   190  Waist circumference:    Startin   Current:      Diabetes: N/A  A1c: 5 9    last measured: 22    Lipid management: Discussed diet and lipid management and Last lipid profile 22  Chol 238  TRG 76  HDL 75      Goals:reduced BMI to < 25, LDL <100, CHOL <200, Improved Rate Your Plate score  >49, Wt  loss 1-2 ppw,  goal of 180 lbs  , choose lean meat (93-95%), eliminate processed meats, reduce portion sizes of meat to 3oz or less, eat 3 or more servings of whole grains a day, seldom eat or choose low fat ice-cream, fruit juice bars or frozen yogurt  and daily saturated fat intake <7%/13g    Progression Toward Goals: Pt is progressing and showing improvement  toward the following goals:  reduced BMI to < 25, LDL <100, CHOL <200, Improved Rate Your Plate score  >42, Wt  loss 1-2 ppw,  goal of 180 lbs  , choose lean meat (93-95%), eliminate processed meats, reduce portion sizes of meat to 3oz or less, eat 3 or more servings of whole grains a day, seldom eat or choose low fat ice-cream, fruit juice bars or frozen yogurt  and daily saturated fat intake <7%/13g   , Will continue to educate and progress as tolerated  Education: heart healthy eating  hydration  nutrition for  lipid management  wt  loss   education class: Heart Healthy Eating  education class:  Label Reading  Plan: switch to low fat cheeses, reduce portion sizes, reduce red meat 1x/wk, switch to skim or 1% milk, avoid processed foods, drink more water, learn how to read food labels and keep added daily sugar <25g/day  Readiness to change: Action:  (Changing behavior)      PSYCHOSOCIAL ASSESSMENT AND PLAN    Emotional:  Depression assessment:  PHQ-9 = 1  1-4 = Minimal Depression            Anxiety measure:  RAÚL-7 = 1  0-4  = Not anxious  Self-reported stress level:  6 f/u: 12/14 pt reports stress is reducing 3/10  Social support: Excellent and Patient reports excellent emotional/social support from wife and their community    Goals:  Reduce perceived stress to 1-3/10, Social Activities in [place] Score < 3, increased energy and Handle anger/frustration better    Progression Toward Goals: Pt is progressing and showing improvement  toward the following goals:  reducing stress, increased energy  , Will continue to educate and progress as tolerated      Education: benefits of a positive support system and stress management techniques  Plan: Class: Stress and Your Health, Class: Relaxation and Exercise  Readiness to change: Action:  (Changing behavior)      OTHER CORE COMPONENTS     Tobacco:   Social History     Tobacco Use   Smoking Status Never   Smokeless Tobacco Never       Tobacco Use Intervention:   N/A:  Patient is a non-smoker     Anginal Symptoms:  None   NTG use: No prescription    Blood pressure:    Resting: 130//92   Exercise: 150//90    Goals: consistent BP < 130/80, reduced dietary sodium <2300mg, moderate intensity exercise >150 mins/wk and medication compliance    Progression Toward Goals: Pt is progressing and showing improvement  toward the following goals:  reduced dietary sodium <2300mg, moderate intensity exercise >150 mins/wk and medication compliance   , Will continue to educate and progress as tolerated      Education:  understanding high blood pressure and it's relationship to CAD and low sodium diet and HTN  Plan: Class: Understanding Heart Disease, Class: Common Heart Medications, Avoid Processed foods, engage in regular exercise, eliminate salt shaker at the table, use salt substitutes and check labels for sodium content  Readiness to change: Action:  (Changing behavior)

## 2023-02-17 ENCOUNTER — APPOINTMENT (OUTPATIENT)
Dept: CARDIAC REHAB | Age: 73
End: 2023-02-17

## 2023-02-21 ENCOUNTER — CLINICAL SUPPORT (OUTPATIENT)
Dept: CARDIAC REHAB | Age: 73
End: 2023-02-21

## 2023-02-21 DIAGNOSIS — Z95.1 S/P CABG X 1: Primary | ICD-10-CM

## 2023-02-21 DIAGNOSIS — Z95.2 S/P AVR (AORTIC VALVE REPLACEMENT): ICD-10-CM

## 2023-02-22 ENCOUNTER — CLINICAL SUPPORT (OUTPATIENT)
Dept: CARDIAC REHAB | Age: 73
End: 2023-02-22

## 2023-02-22 DIAGNOSIS — Z95.1 S/P CABG X 1: Primary | ICD-10-CM

## 2023-02-22 DIAGNOSIS — Z95.2 S/P AVR (AORTIC VALVE REPLACEMENT): ICD-10-CM

## 2023-02-24 ENCOUNTER — CLINICAL SUPPORT (OUTPATIENT)
Dept: CARDIAC REHAB | Age: 73
End: 2023-02-24

## 2023-02-24 DIAGNOSIS — Z95.1 S/P CABG X 1: Primary | ICD-10-CM

## 2023-02-28 ENCOUNTER — CLINICAL SUPPORT (OUTPATIENT)
Dept: CARDIAC REHAB | Age: 73
End: 2023-02-28

## 2023-02-28 DIAGNOSIS — Z95.1 S/P CABG X 1: Primary | ICD-10-CM

## 2023-03-01 ENCOUNTER — CLINICAL SUPPORT (OUTPATIENT)
Dept: CARDIAC REHAB | Age: 73
End: 2023-03-01

## 2023-03-01 DIAGNOSIS — Z95.2 S/P AVR (AORTIC VALVE REPLACEMENT): ICD-10-CM

## 2023-03-01 DIAGNOSIS — Z95.1 S/P CABG X 1: ICD-10-CM

## 2023-03-03 ENCOUNTER — APPOINTMENT (OUTPATIENT)
Dept: CARDIAC REHAB | Age: 73
End: 2023-03-03

## 2023-03-07 ENCOUNTER — CLINICAL SUPPORT (OUTPATIENT)
Dept: CARDIAC REHAB | Age: 73
End: 2023-03-07

## 2023-03-07 DIAGNOSIS — Z95.1 S/P CABG X 1: Primary | ICD-10-CM

## 2023-03-07 DIAGNOSIS — Z95.2 S/P AVR (AORTIC VALVE REPLACEMENT): ICD-10-CM

## 2023-03-08 ENCOUNTER — APPOINTMENT (OUTPATIENT)
Dept: CARDIAC REHAB | Age: 73
End: 2023-03-08

## 2023-03-08 ENCOUNTER — CLINICAL SUPPORT (OUTPATIENT)
Dept: CARDIAC REHAB | Age: 73
End: 2023-03-08

## 2023-03-08 DIAGNOSIS — Z95.1 S/P CABG X 1: ICD-10-CM

## 2023-03-08 DIAGNOSIS — Z95.2 S/P AVR (AORTIC VALVE REPLACEMENT): ICD-10-CM

## 2023-03-10 ENCOUNTER — APPOINTMENT (OUTPATIENT)
Dept: CARDIAC REHAB | Age: 73
End: 2023-03-10

## 2023-03-15 ENCOUNTER — CLINICAL SUPPORT (OUTPATIENT)
Dept: CARDIAC REHAB | Age: 73
End: 2023-03-15

## 2023-03-15 DIAGNOSIS — Z95.2 S/P AVR (AORTIC VALVE REPLACEMENT): ICD-10-CM

## 2023-03-15 DIAGNOSIS — Z95.1 S/P CABG X 1: ICD-10-CM

## 2023-03-17 NOTE — PROGRESS NOTES
Cardiac Rehabilitation Plan of Care   Discharge          Today's date: 3/17/2023   # of Exercise Sessions Completed: 36  Patient name: Adrianna Correa      : 1950  Age: 74 y o  MRN: [id number]  Referring Physician: Darrell Beltran PA-C  Cardiologist: Rina Oneal MD  Provider: Vanita Call  Clinician: Luisa Hernnádez, MS, CEP, CCRP      Dx:   Encounter Diagnoses   Name Primary? • S/P CABG x 1    • S/P AVR (aortic valve replacement)      Date of onset: 10/4/22      SUMMARY OF PROGRESS: 3/14/23:  Discharge note for Jose  He had 14% improvement in functional capacity increasing His max METs in the submaximal TM ETT  from 5 8 to 6 6 with test termination of RPE 6  His exercise tolerance (max METs in tolerated in cardiac rehab) increased by 39%  His [facility] activity estimated MET level with ADLs and physical activity remained at 9 9 METs  His [facility] QOL improved by 29%  PHQ-9 score decreased from 1 to 0  RAÚL-7 remained at 1  His weight increased by 6 pounds  Waist circumference did not change  Rate Your Plate score improved from 54 to 62   Jans has been supplementing CR sessions with home exercise which includes outdoor walking 30 mins  He reports increased stamina, strength  with activity  Jose tolerates 40 mins at 4 8 - 6 0 METs plus wt training  NSR on tele with 1AVB and rare PACs, m/f PVCS on telemetry  RHR 76 - 92, ExHR 109 - 124  Resting /90 - 140/92 with appropriate response to exercise reaching 150/76 - 164/90  All group education classes on cardiac risk factor modification were attended by the patient  Discharge plans include joining [address],  home exercise including outdoor walking, elliptical and strength training  He plans to improve his focus on wt loss aiming for 176lbs  Encouraged Jose to eliminate smoked/processed meats and cheeses  Encouraged Pt to continue exercise  Frequency: 4-6 days/wk, Intensity: RPE 4-5, Time: 40-50 mins daily, 150-200 mins/wk   Pt was encouraged to continue eating heart healthy  Pt was encouraged to remain compliant with medications and f/u with cardiologist with any cardiac symptoms, medication management and updated lipid profile  2/15/23:  Yue Cabrera remains compliant attending rehab 3x/wk when his schedule allows  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[employer] and has missed a few sessions with work commitments  He has been supplementing with home walking at least 30 mins  He tolerates 40-44 mins at 4 4-6 0 METS plus weight training  Resting /82 - 120/82(one very high resting BP - 188/82) with appropriate response to exercise reaching 144/72 - 160/82  NSR on tele with 1AVB and rare PACs, m/f PVCS, short runs of bigeminy and rare couplets observed  RHR 68-80 ExHR 110-122  No cardiac complaints while in rehab  Patient admits to 100% medication compliance  His weight is creeping back up and he is now 3lbs higher then his starting weight  Current weight 190  Patient reports he is working on  dietary modifications with the goal of rare red/processed meats, low fat dairy, reduced added sugars and refined flours  He states he has cut down his consumption of salami & cheese but still eats them regularly  The patient is a non-smoker  Patient reports excellent social/emotional support  Patient attends group educational classes on cardiac risk factor modification  His exercise program will be progressed as tolerated to maintain RPE 4-6  The patient has the following personal goals he hopes to achieved by discharge:  dietary modifications including reducing intake of cheese and salami, 5lb wt loss, return to [employer] , 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  All goals are currently in progress  He reports increased confidence with exertion, improved endurance, stamina and energy and less stress   Pt will continue to be educated on lifestyle modifications and encouraged to supplement with a home exercise program to reach his goals  Will continue to monitor  1/11/23: Randee Cruz remains compliant attending rehab 3x/wk, when scheduling allows  He tolerates 40-44 mins at 4 4-5 0 METS plus weight training  Resting //92 with appropriate response to exercise reaching 150//90  He often arrives hypertensive, sometimes reaching as high as 170/100 at rest  He often requires longer resting periods prior to beginning exercise to allow for BP to normalize  He is a  and often arrives after commuting from work at [employer] which could be contributing to his higher BP readings  NSR on tele with 1AVB and rare PACs, m/f PVCS, short runs of bigeminy and rare couplets observed  RHR 68-80 ExHR 102-115  He maintains a home exercise 7 days/wk which includes 30 mins of daily walking with decent incline  No cardiac complaints while in rehab  Patient admits to 100% medication compliance  He is progressing toward wt loss goals with a loss of 5 pounds  Current weight 182  Patient has been working on  dietary modifications with the goal of rare red/processed meats, low fat dairy, reduced added sugars and refined flours  He states he has cut down his consumption of salami & cheese  The patient is a non-smoker  Patient reports excellent social/emotional support  Patient attends group educational classes on cardiac risk factor modification  His exercise program will be progressed as tolerated to maintain RPE 4-6  The patient has the following personal goals he hopes to achieved by discharge:  dietary modifications including reducing intake of cheese and salami, 5lb wt loss, return to [employer] , 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  All goals are currently in progress  He reports increased confidence with exertion, improved endurance, stamina and energy and less stress   Pt will continue to be educated on lifestyle modifications and encouraged to supplement with a home exercise program to reach his goals  Will continue to monitor  12/15: Renetta Perez is compliant attending cardiac rehab exercise sessions 3x/wk  He tolerates 40 mins at 3 1 - 4 9 METs plus wt training  He is tolerating progression of intensity levels to maintain RPE 4-6  Resting BP   134/80 - 148/90 with appropriate response to exercise reaching 140/84- 154/80  NSR on tele with 1AVB and rare PACs observed  On 12/14, a 5 beat SVT noted during exercise  RHR 77 - 92 ExHR 94 - 124  He maintains a home exercise 7 days/wk which includes 30 mins of daily walking with decent incline  No cardiac complaints while in rehab  He did report pain, occurring outside of rehab, that precipitates randomly located in the center of his chest describing it as less than 1 on 1-10 scale  Patient admits to 100% medication compliance  Lopressor 25mg QD added to pt's med list as of 12/8  He states that his warfarin is still being adjusted  He is progressing toward wt loss goals with a loss of 1 pounds  Patient has been working on  dietary modifications with the goal of rare red/processed meats, low fat dairy, reduced added sugars and refined flours  He states he has cut down his consumption of salami & cheese  The patient is a non-smoker  When addressed, the patient denies depression/anxiety  Patient reports excellent social/emotional support  Patient attends group educational classes on cardiac risk factor modification  His exercise program will be progressed as tolerated to maintain RPE 4-6  The patient has the following personal goals he hopes to achieved by discharge:  dietary modifications including reducing intake of cheese and salami, 5lb wt loss, return to [employer] , 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  All goals are currently in progress   He reports increased confidence with exertion, improved endurance, stamina and energy and less stress  Pt will continue to be educated on lifestyle modifications and encouraged to supplement with a home exercise program to reach the following goals in the next 30 days: continue to lose weight and drink more water  11/15: Today is Jose's initial evaluation to begin Cardiac Rehab post CABG x 1/AVR on October 4, 2022  The patient does currently follow a formal exercise program at home, including daily 30 min walks which includes a decent incline  He has resumed light to moderate ADLs following sternal restrictions and without fatigue and tolerating them well  Depression screening using the PHQ-9 interprets the patient's score of  1  as  1-4 = Minimal Depression  RAÚL-7 screening tool for anxiety suggests 1  0-4  = Not anxious  When addressed, the patient denies having depression/anxiety  Patient reports excellent social/emotional support from his wife and community  Information to begin using [facility] was provided as well as contact information for counseling through [employer]  PHQ-9 score will be reassessed in 30 days  The patient is a non-smoker  Patient admits to 100% medication compliance  Patient reports the following physical limitations: R hip OA  The patient completed an initial submaximal TM ETT  The patient completed 1 minutes of stage IV (5 8METs) with test termination of RPE 6  Resting  /90 with appropriate hemodynamic response to exercise reaching 194/92  Patient denied symptoms during exercise  Telemetry revealed NSR, occ PACs  Patient was counseled on exercise guidelines to achieve a minimum of 150 mins/wk of moderate intensity (RPE 4-6) exercise and encouraged to add 1-2 days of exercise on opposite days of cardiac rehab as tolerated  We discussed current dietary habits and goals of heart healthy eating for lipid management and weight loss     Patient's goals include: dietary modifications including reducing intake of cheese and salami, 5lb wt loss, return to [employer], 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  The patient's CAD risk factors include:  stress, obesity/overweight, hypertension and hyperlipidemia  His education will focus on lifestyle modification/education specific to His needs  Patient will attend group education classes on heart healthy eating, reading food labels, stress management, risk factor reduction, understanding heart disease and common heart medications  Patient will attend 35 monitored exercise sessions, 3x/wk for 12-18 weeks beginning 2022       Medication compliance: Yes   Comments: Pt reports to be compliant with medications  Fall Risk: Low   Comments: Ambulates with a steady gait with no assist device and Denies a fall in the past 6 months    EKG Interpretation: NSR, occ PACs    EXERCISE ASSESSMENT and PLAN    Exercise Prescription:      Frequency: 3 days/week   Supplement with home exercise 2+ days/wk as tolerated       Minutes: 40-44       METS: 4 8-6 0         HR:    RPE: 4-5         Modalities: Treadmill, Airdyne bike, UBE, Lifecycle, Elliptical and Rower      Home Exercise Progression:    Frequency: 3 - 5 days/wk   Minutes: 45                         >150 mins/wk of moderate intensity exercise   METS: 4 5 - 6 5   HR: 100-125    RPE: 4-6   Modalities: Treadmill, Airdyne bike, Lifecycle, Elliptical and Rower    Strength trainin-3 days / week  12-15 repetitions  1-2 sets per modality    Modalities: Leg Press, Chest Press, Pull Downs and Lateral Raise    Home Exercise: Type: walking, Frequency: 7 days/week, Duration: 30 mins    Goals:  Exercise 5 days/wk, >150mins/wk of moderate intensity exercise, return to regular exercise at the gym    Progression Toward Goals:  Goals met: 10% improvement in functional capacity - based on max METs achieved in fitness assessment, Increase in exercise capacity by 40% - based on peak METs tolerated in cardiac rehab exercise session, Exercise 5 days/wk, >150mins/wk of moderate intensity exercise, Resume ADLs with increased strength, Return to work unrestricted and return to regular exercise at the gym , Patient will be encouraged to focus on lifestyle modifications following discharge  Education: home exercise guidelines, AHA guidelines to achieve >150 mins/wk of moderate exercise, RPE scale, class: Risk Factors for Heart Disease, exercise instructions/guidelines for discharge  and physical activity/exercise in extreme weather conditions   Plan:  [address],  home exercise including outdoor walking, elliptical and strength training  Readiness to change: Maintenance: (Maintaining the behavior change)      NUTRITION ASSESSMENT AND PLAN    Weight control:    Starting weight: 187 7   Current weight:   193 8  Waist circumference:    Startin   Current:  38    Diabetes: N/A  A1c: 5 9    last measured: 22    Lipid management: Discussed diet and lipid management and Last lipid profile 22  Chol 238  TRG 76  HDL 75      Goals:reduced BMI to < 25, LDL <100, CHOL <200, Improved Rate Your Plate score  >04, Wt  loss 1-2 ppw,  goal of 180 lbs  , choose lean meat (93-95%), eliminate processed meats, reduce portion sizes of meat to 3oz or less, eat 3 or more servings of whole grains a day, seldom eat or choose low fat ice-cream, fruit juice bars or frozen yogurt  and daily saturated fat intake <7%/13g    Progression Toward Goals: Goals not met: reduced BMI to < 25, LDL <100, CHOL <200, Improved Rate Your Plate score  >53, Wt  loss 1-2 ppw,  goal of 180 lbs  , choose lean meat (93-95%), eliminate processed meats, reduce portion sizes of meat to 3oz or less, eat 3 or more servings of whole grains a day, seldom eat or choose low fat ice-cream, fruit juice bars or frozen yogurt  and daily saturated fat intake <7%/13g   , Patient will be encouraged to focus on lifestyle modifications following discharge  , reviewed goals of healthy eating in d/c planning    Education: heart healthy eating  hydration  nutrition for  lipid management  wt  loss   education class: Heart Healthy Eating  education class:  Label Reading  Plan: switch to low fat cheeses, reduce portion sizes, reduce red meat 1x/wk, switch to skim or 1% milk, avoid processed foods, drink more water, learn how to read food labels and keep added daily sugar <25g/day  Readiness to change: Action:  (Changing behavior)      PSYCHOSOCIAL ASSESSMENT AND PLAN    Emotional:  Depression assessment:  PHQ-9 = 1  1-4 = Minimal Depression            Anxiety measure:  RAÚL-7 = 1  0-4  = Not anxious  Self-reported stress level:  6 f/u:  pt reports stress is reducing3/10  Social support: Excellent and Patient reports excellent emotional/social support from wife and their community    Goals: maintain stress to 1-3/10, continue to improve fitness  Progression Toward Goals: Goals met: Reduce perceived stress to 1-3/10, Social Activities in Vaughn Score < 3, increased energy and Handle anger/frustration better  , Patient will be encouraged to focus on lifestyle modifications following discharge      Education: benefits of a positive support system, stress management techniques, class:  Stress and Your Health  and class:  Relaxation  Plan: Exercise and Spend time outdoors  Readiness to change: Maintenance: (Maintaining the behavior change)      OTHER CORE COMPONENTS     Tobacco:   Social History     Tobacco Use   Smoking Status Never   Smokeless Tobacco Never       Tobacco Use Intervention:   N/A:  Patient is a non-smoker     Anginal Symptoms:  None   NTG use: No prescription    Blood pressure:    Restin//92   Exercise: 150//90    Goals: consistent BP < 130/80, reduced dietary sodium <2300mg, moderate intensity exercise >150 mins/wk and medication compliance    Progression Toward Goals: Goals met: consistent BP < 130/80, reduced dietary sodium <2300mg, moderate intensity exercise >150 mins/wk and medication compliance , Patient will be encouraged to focus on lifestyle modifications following discharge      Education:  understanding high blood pressure and it's relationship to CAD, low sodium diet and HTN, Education class:  Common Heart Medications and Education class: Understanding Heart Disease  Plan: Avoid Processed foods, engage in regular exercise, eliminate salt shaker at the table, use salt substitutes and check labels for sodium content  Readiness to change: Maintenance: (Maintaining the behavior change)

## 2023-09-13 RX ORDER — CHLORAL HYDRATE 500 MG
2 CAPSULE ORAL DAILY
COMMUNITY

## 2023-09-15 ENCOUNTER — OFFICE VISIT (OUTPATIENT)
Dept: UROLOGY | Facility: AMBULATORY SURGERY CENTER | Age: 73
End: 2023-09-15
Payer: COMMERCIAL

## 2023-09-15 ENCOUNTER — TELEPHONE (OUTPATIENT)
Dept: UROLOGY | Facility: AMBULATORY SURGERY CENTER | Age: 73
End: 2023-09-15

## 2023-09-15 VITALS
BODY MASS INDEX: 27.58 KG/M2 | DIASTOLIC BLOOD PRESSURE: 90 MMHG | RESPIRATION RATE: 18 BRPM | SYSTOLIC BLOOD PRESSURE: 140 MMHG | HEIGHT: 71 IN | HEART RATE: 85 BPM | OXYGEN SATURATION: 97 % | WEIGHT: 197 LBS

## 2023-09-15 DIAGNOSIS — Z12.5 SCREENING FOR PROSTATE CANCER: Primary | ICD-10-CM

## 2023-09-15 PROCEDURE — 99213 OFFICE O/P EST LOW 20 MIN: CPT | Performed by: UROLOGY

## 2023-09-15 RX ORDER — MAGNESIUM 30 MG
30 TABLET ORAL
COMMUNITY

## 2023-09-15 NOTE — PROGRESS NOTES
9/15/2023    Rodney Cantu  1950  [id number]        Assessment  Prostate cancer screening    Plan  Prostate examination reveals mildly enlarged prostate, normal for age. No suspicious findings for malignancy. He will have PSA performed. As long as within his normal range, we will plan follow-up in 1 year. 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 He has history of previous elevated PSA and gross hematuria, likely associated with prostatitis. CT urogram and cystoscopy were normal at [facility] in 2017. He also has a prior history of prostate biopsy in 2011 which did not reveal any malignancy. Over the last few years his PSA has been stable around 1.5. There is not a current PSA. He denies urinary symptoms. He takes saw palmetto as a supplement and is happy with this. Review of Systems  Review of Systems   Constitutional: Negative. HENT: Negative. Respiratory: Negative. Cardiovascular: Negative. Gastrointestinal: Negative. Genitourinary:        As per HPI   Musculoskeletal: Negative. Skin: Negative. Neurological: Negative. Hematological: Negative. Past Medical History  Past Medical History:   Diagnosis Date   • Aortic stenosis    • Ascending aortic aneurysm (HCC)    • Hyperlipidemia    • Hypertension    • Nonrheumatic aortic valve insufficiency    • Osteoarthritis    • Prostatitis        Past Social History  Past Surgical History:   Procedure Laterality Date   • CARDIAC CATHETERIZATION N/A 9/30/2022    Procedure: Cardiac RHC/LHC; Surgeon: Sherin Forte MD;  Location: [facility];   Service: Cardiology   • COLONOSCOPY  08/09/2017   • CORONARY ARTERY BYPASS GRAFT N/A 10/4/2022    Procedure: CABG X 1, LIMA TO LAD;  Surgeon: Catarina Chris MD;  Location: [facility];  Service: Cardiac Surgery   • EYE MUSCLE SURGERY      stigmatism correction age 13   • INGUINAL HERNIA REPAIR Left • MS RPLCMT AORTIC VALVE OPN W/STENTLESS TISSUE VALVE N/A 10/4/2022    Procedure: REPLACEMENT VALVE AORTIC (AVR) TORIBIO RODRIGUEZ 25MM; Surgeon: Caroline Marie MD;  Location: [facility];  Service: Cardiac Surgery   • SKIN CANCER EXCISION     • TONSILECTOMY AND ADNOIDECTOMY         Past Family History  History reviewed. No pertinent family history. Past Social history  Social History     Socioeconomic History   • Marital status: /Civil Union     Spouse name: Not on file   • Number of children: Not on file   • Years of education: Not on file   • Highest education level: Not on file   Occupational History   • Not on file   Tobacco Use   • Smoking status: Never   • Smokeless tobacco: Never   Vaping Use   • Vaping Use: Never used   Substance and Sexual Activity   • Alcohol use: Not Currently     Alcohol/week: 28.0 standard drinks of alcohol     Types: 14 Glasses of wine, 14 Shots of liquor per week     Comment: 2 small glasses of wine & 2 shots whiskey daily   • Drug use: Not Currently   • Sexual activity: Yes     Partners: Female   Other Topics Concern   • Not on file   Social History Narrative   • Not on file     Social Determinants of Health     Financial Resource Strain: Not on file   Food Insecurity: No Food Insecurity (10/6/2022)    Hunger Vital Sign    • Worried About Running Out of Food in the Last Year: Never true    • Ran Out of Food in the Last Year: Never true   Transportation Needs: No Transportation Needs (10/6/2022)    PRAPARE - Transportation    • Lack of Transportation (Medical): No    • Lack of Transportation (Non-Medical):  No   Physical Activity: Not on file   Stress: Not on file   Social Connections: Not on file   Intimate Partner Violence: Not on file   Housing Stability: Low Risk  (10/6/2022)    Housing Stability Vital Sign    • Unable to Pay for Housing in the Last Year: No    • Number of Places Lived in the Last Year: 1    • Unstable Housing in the Last Year: No     Social History     Tobacco Use   Smoking Status Never   Smokeless Tobacco Never       Current Medications  Current Outpatient Medications   Medication Sig Dispense Refill   • aspirin (ECOTRIN LOW STRENGTH) 81 mg EC tablet Take 81 mg by mouth daily     • atorvastatin (LIPITOR) 80 mg tablet TAKE 1 TABLET(80 MG) BY MOUTH DAILY WITH DINNER 90 tablet 3   • magnesium 30 MG tablet Take 30 mg by mouth     • metoprolol succinate (TOPROL-XL) 50 mg 24 hr tablet TAKE 1 AND 1/2 TABLETS(75 MG) BY MOUTH DAILY 135 tablet 3   • Multiple Vitamin (multivitamin) tablet Take 1 tablet by mouth daily     • Omega-3 1000 MG CAPS Take 2 g by mouth daily     • apixaban (Eliquis) 5 mg Take 1 tablet (5 mg total) by mouth 2 (two) times a day 60 tablet 5   • ascorbic acid (VITAMIN C) 500 MG tablet Take 1 tablet (500 mg total) by mouth daily Do not start before October 11, 2022. 90 tablet 0   • ferrous sulfate 325 (65 Fe) mg tablet Take 1 tablet (325 mg total) by mouth daily with breakfast Do not start before October 11, 2022. (Patient not taking: Reported on 4/14/2023) 90 tablet 0   • lisinopril (ZESTRIL) 5 mg tablet Take 1 tablet (5 mg total) by mouth daily 90 tablet 5   • omeprazole (PriLOSEC) 20 mg delayed release capsule Take 1 capsule (20 mg total) by mouth daily (Patient not taking: Reported on 1/5/2023) 30 capsule 0     No current facility-administered medications for this visit. Allergies  Allergies   Allergen Reactions   • Pollen Extract Nasal Congestion       Past Medical History, Social History, Family History, medications and allergies were reviewed. Vitals  Vitals:    09/15/23 0850   BP: 140/90   BP Location: Left arm   Patient Position: Sitting   Cuff Size: Standard   Pulse: 85   Resp: 18   SpO2: 97%   Weight: 89.4 kg (197 lb)   Height: 5' 11" (1.803 m)       Physical Exam  Physical Exam  Vitals reviewed. Constitutional:       Appearance: He is well-developed. HENT:      Head: Normocephalic and atraumatic.    Eyes: Conjunctiva/sclera: Conjunctivae normal.   Cardiovascular:      Rate and Rhythm: Normal rate. Pulmonary:      Effort: Pulmonary effort is normal.   Abdominal:      Palpations: Abdomen is soft. Genitourinary:     Comments: Prostate about 40-45 gm, no nodules. External hemorrhoid. Musculoskeletal:         General: Normal range of motion. Skin:     General: Skin is warm and dry. Neurological:      Mental Status: He is alert and oriented to person, place, and time.    Psychiatric:         Mood and Affect: Mood normal.           Results  Lab Results   Component Value Date    PSA 1.4 09/23/2022     Lab Results   Component Value Date    GLUCOSE 146 (H) 10/04/2022    CALCIUM 9.3 01/12/2023    K 5.0 01/12/2023    CO2 31 01/12/2023     01/12/2023    BUN 16 01/12/2023    CREATININE 0.97 01/12/2023     Lab Results   Component Value Date    WBC 10.00 10/10/2022    HGB 8.5 (L) 10/10/2022    HCT 25.3 (L) 10/10/2022    MCV 95 10/10/2022     10/10/2022

## 2023-09-20 ENCOUNTER — APPOINTMENT (OUTPATIENT)
Dept: LAB | Age: 73
End: 2023-09-20
Payer: COMMERCIAL

## 2023-09-20 DIAGNOSIS — Z12.5 SCREENING FOR PROSTATE CANCER: ICD-10-CM

## 2023-09-20 LAB — PSA SERPL-MCNC: 1.02 NG/ML (ref 0–4)

## 2023-09-20 PROCEDURE — G0103 PSA SCREENING: HCPCS

## 2023-09-20 PROCEDURE — 36415 COLL VENOUS BLD VENIPUNCTURE: CPT

## 2023-09-29 DIAGNOSIS — I48.91 ATRIAL FIBRILLATION, UNSPECIFIED TYPE (HCC): ICD-10-CM

## 2023-09-29 RX ORDER — APIXABAN 5 MG/1
5 TABLET, FILM COATED ORAL 2 TIMES DAILY
Qty: 60 TABLET | Refills: 5 | Status: SHIPPED | OUTPATIENT
Start: 2023-09-29

## 2023-10-19 NOTE — PROGRESS NOTES
Outpatient Cardiology Consult Note - Mark Rodriguez 74 y.o. male MRN: [id number]    @ Encounter: [id number]    Assessment:  74 y.o. male Hx per chart I initially met him in 2022 when he p/w second opinion for timing of intervention for Ao aneurysm, BAV, severe AS, moderate AI; was followed at [facility] in past, now s/p 10/2022 bAVR+CABG as below.     Darren male   at [facility]  Bicuspid Ao valve, severe AS, Moderate AI  Asc Ao aneurysm 4.6cm on CT, 4.3cm by echo, stable over serial imaging through 2022  S/p 10/4/2022 bioAVR and CABGx1 DALE-AJIT, Dr Canales Broad op EF 60%  Recurrent post op AF inpt and then subsequently as outpt about 1-2 months after surgery, on AC  LVH, IVSd 1.8cm, intracavitary gradient on intraop YOBANY  L hip OA  Pre DM  HTN  HLD  Etoh use abuse - has 4 etoh drinks daily          Lab Units 01/12/23  0915 01/05/23  1647 10/10/22  0539   CREATININE mg/dL 0.97 0.87 0.77         Lab Results   Component Value Date    K 5.0 01/12/2023     Lab Results   Component Value Date    HGBA1C 5.9 (H) 04/11/2023     Lab Results   Component Value Date    PBJ2BRSQDMTH 3.180 01/12/2023     Lab Results   Component Value Date    LDLCALC 148 (H) 09/23/2022     No results found for: "BNP"   No results found for: "NTBNP"        Invalid input(s): "02HGBVB"    ASCVD 32% at 10 yr in past>now better w improved lipids    TODAY's PLAN:  Warm, euvoelmic  No new cardiac complaints, feels generally well  In gym, escalating his workouts, tolerating well  Works a lot, many hours lecturing on podium, active lifestyle, no limitations  Has 4 etoh drinks daily - advised reduction  BP at goal  RRR on exam    Recheck lipids next visit    CTA Aorta for aneurysm screening - ordered today    cw ASA  cw eliquis 5 bid  cw atorva 80 daily    cw toprol xl 75 qd    Cw lisinopril 5 qd    No med changes today    Key info from my prior notes:    Advised stay well hydrated, long discussion today    Studies:    EKG:  Personally reviewed by me. 10/20/22: nsr, 1oavb, nl axis, LVH, non specific STT changes  8/18/22: nsr, nl axis, LVH, LAE, anterior Q waves, non specific STT changes  1/5/23: nsr, nl axis, non specific STT changes  4/14/23: nsr, nl axis, borderline LVH, non specific STT changes    10/4/22 intraop YOBANY:  Echocardiographic and Doppler Measurements     PREPROCEDURE     LEFT VENTRICLE:  Systolic Function: normal. Ejection Fraction: 60%. RIGHT VENTRICLE:  Systolic Function: normal.  Cavity size normal.       AORTIC VALVE:  Leaflets: fusion oc LCC and RCC; severely calcified and bileaflet. Mean Gradient: 60 mmHg. Peak Gradient: 90 mmHg. Maximum velocity (cm/s): 4.7 m/sec. Regurgitation: moderate. Pressure Half-time: 474 ms. MITRAL VALVE:  Leaflets: calcified and normal. Leaflet Motions: chordal CHRISTIANO. Regurgitation: mild. TRICUSPID VALVE:  Leaflets: normal.   Regurgitation: mild. ASCENDING AORTA:   Diameter (cm): 4.4-4.5 cm (YOBANY and epi-aortic) Dissection not present. AORTIC ARCH:    dissection not present. Grade 3: atheroma protruding < 0.5 cm into lumen. DESCENDING AORTA:    Dissection not present. Grade 3: atheroma protruding < 0.5 cm into lumen. RIGHT ATRIUM:   No spontaneous echo contrast.     LEFT ATRIUM:   No spontaneous echo contrast.     LEFT ATRIAL APPENDAGE:   No spontaneous echo contrast Emptying Velocity: 41 cm/sec. OTHER ATRIAL FINDINGS:  No JAY clot. ATRIAL SEPTUM:  Intra-atrial septal morphology: no PFO by CFD. EPIAORTIC:  Plaque Thickness: 0-5 mm. POSTPROCEDURE     LEFT VENTRICLE: Unchanged . RIGHT VENTRICLE:   Systolic Function: top normal.      AORTIC VALVE:   Leaflets: bioprosthetic valve in aortic position, well-seated, does not rock, good leaflet excursion and closure, no PVL and bioprosthetic.   Mean Gradient: not accurate, given presence iof CHRISTIANO - gradient across AV/LVOT 15 mmHg ("dagger shape, sugesting LVOTO)        MITRAL VALVE: Regurgitation: mild-mod; significant chordal CHRISTIANO remains; gradient across LVOT/AV 15 mmHg. TRICUSPID VALVE: Unchanged . 10/2022 AV path:  Final Diagnosis   A. Heart Valve, aortic valve:  - Senile calcific degeneration of three cardiac valvular leaflets, consistent with senile calcific aortic stenosis. - Michele Pak elastin stains & trichrome stains demonstrate preservation of the usual layered valvular architecture with bulky calcifications centered in the fibrosa layer without neovascularization, acute inflammation or destructive fibrinous changes. [facility] 9/30/22:     Mid LAD lesion is 80% stenosed. Single vessel disease, with an 80% stenosis of the LAD immediately distal to the D1 bifurcation. Plan: surgical AVR, possible aneurysm repair. EST 8/29/22:    Resting ECG: There is left ventricular hypertrophy without strain. There is a myocardial infarction located in the anteroseptal region. Blood pressure demonstrated a hypotensive response and heart rate demonstrated a normal response to stress. Stress ECG: Horizontal ST depression of 1.0 mm in the inferior leads is noted. The ECG was positive for ischemia. The stress ECG is consistent with ischemia after maximal exercise, without reproduction of symptoms. 8/25/22 TTE:    Left Ventricle: Left ventricular cavity size is normal. Wall thickness is moderate to severely increased. There is moderate concentric hypertrophy. There is severe asymmetric hypertrophy of the septal wall. The left ventricular ejection fraction is 70%. Systolic function is vigorous. Wall motion is normal. Diastolic function is mildly abnormal, consistent with grade I (abnormal) relaxation. Left Atrium: The atrium is mildly dilated. Aortic Valve: The aortic valve is functionally bicuspid with commissural fusion of the right-left coronary cusp. The leaflets are severely thickened. The leaflets are moderately calcified. There is severely reduced mobility.  There is moderate regurgitation. There is severe stenosis. The aortic valve peak velocity is 4.34 m/s. The aortic valve mean gradient is 49 mmHg. The dimensionless velocity index is 0.30. The aortic valve area is 0.85 cm2. Mitral Valve: There is mild to moderate annular calcification. There is mild regurgitation. Tricuspid Valve: There is mild regurgitation. Pulmonic Valve: There is mild regurgitation. Aorta: The aortic root is mildly dilated. The ascending aorta is mildly dilated. The aortic root is 4.10 cm. The ascending aorta is 4.2 cm. IVSd 1.8cm    9/23/22 CT chest:  IMPRESSION:     Unchanged ascending thoracic aortic aneurysm measuring 4.6 cm. Previous lung nodules are smaller, less prominent and groundglass compared to solid appearance. [facility] data:  TTE (1/2022):   Left Ventricle: There is severe concentric hypertrophy. Systolic function is normal with an ejection fraction of 60-65%. There is grade I (mild) diastolic dysfunction and normal left atrial pressure. Left Atrium: Left atrium cavity is moderately dilated. Aortic Valve: There is at least moderate regurgitation. There is severe stenosis. Ascending Aorta: The aortic root is mildly dilated. The ascending aorta is mildly dilated. Tricuspid Valve: There is mild regurgitation. CT chest (9/21): Stable pulmonary nodularity at 3 years follow-up. Heavy aortic valve calcification with fusiform ascending aortic aneurysm  Unchanged    Direct review of study demonstrates unchanged appearance of aorta measuring approximately 4.5 cm in maximum diameter. CT chest 9/2021  Heavy aortic valve calcification with fusiform ascending aortic aneurysm  Unchanged 4.5 cm     HPI:   74 y.o. male Hx per chart p/w second opinion for timing of intervention for Ao aneurysm, BAV, severe AS, moderate AI; follows at [facility]. EF preserved and LV not dilated per OSH TTE.  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 Syncope in heat ovr 20 years ago, none since. Feels good energy level. Reports no progression of cardiac sx or fatigue in recent months/years. No new CP/SOB/dizziness/palpitations/syncope. No new leg swelling, PND, pillow orthopnea, unintentional weight changes. 11/2021 OSH cards note:  Stable at 4.5-4.6 cm for the past 2-3 years. Follow up with CTS. Aware of ~5 cm threshold for surgery given his BAV. He will avoid heavy lifting and exertion. BP is controlled    Home meds:   aspirin 81 MG EC tablet Take 81 mg by mouth daily. cartilage/collagen/bor/hyalur (JOINT HEALTH ORAL) Take 120 capsules by mouth.    lisinopriL (PRINIVIL,ZESTRIL) 20 MG tablet TAKE 1 TABLET BY MOUTH DAILY 90 tablet 3    magnesium oxide 200 mg magnesium chew Take by mouth. Magnesium w/ b6    multivitamin (THERAGRAN) tab Take 1 tablet by mouth daily. SAW PALMETTO FRUIT ORAL Take 1 tablet by mouth daily. 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 He drinks 2-3 glasses of red wine nightly. He is originally from [place] [place]. No current tobacco use, alcohol abuse, illicit drug use. Interval Hx:  As noted in 'plan' section above and prior epic chart notes. No new CP/SOB/dizziness/palpitations/syncope. No new fatigue. No new unintentional weight changes. No new leg swelling, PND, pillow orthopnea. No new fevers, chills, cough, nausea, vomiting, diarrhea, dysuria. 4/14/23:  Warm, euvolemic  Feels very well  ECG - NSR today  Lipids better in care everywhere    BP remains above target - restart prior lisinopril but at lower dose of 5mg qd  Fu BMP in 1 week  No new CP/SOB/dizziness/palpitations/syncope.   No new leg swelling, PND, pillow orthopnea, unintentional weight changes, fatigue. No new fevers, chills, cough, nausea, vomiting, diarrhea, dysuria. No bleeding    Last visit:  Lengthy discussion with pt 9/1/22, regarding calcified BAV, Asc Ao aneurysm 4.6cm by outside CT scan (stable size), recent EST with hypotensive exercise response, dizziness, ischemic ECG. S/p bioAVR and CABGx1 10/4/22, doing well since  Warm/dry, euvolemic today; RRR on exam  Ambulating further, using stairs, doing all ADLs, mild SABILLON ongoing but improving    No new CP/dizziness/palpitations/syncope. No new leg swelling, PND, pillow orthopnea, unintentional weight changes, fatigue.   No new fevers, chills, cough, nausea, vomiting, diarrhea, dysuri      Past Medical History:   Diagnosis Date    Aortic stenosis     Ascending aortic aneurysm (HCC)     Hyperlipidemia     Hypertension     Nonrheumatic aortic valve insufficiency     Osteoarthritis     Prostatitis      Patient Active Problem List   Diagnosis    Nonrheumatic aortic valve stenosis    Ascending aortic aneurysm    Nonrheumatic aortic valve insufficiency    Pre-diabetes    Hyperlipidemia    Bicuspid aortic valve    S/P CABG x 1    Coronary artery disease    S/P AVR (aortic valve replacement)    Anticoagulated on Coumadin    Postoperative atrial fibrillation (HCC)    Hyponatremia    Postoperative anemia due to acute blood loss    Thrombocytopenia (HCC)    Leukocytosis       ROS:  10 point ROS negative except as specified in HPI    Allergies   Allergen Reactions    Pollen Extract Nasal Congestion       Current Outpatient Medications   Medication Instructions    ascorbic acid (VITAMIN C) 500 mg, Oral, Daily    aspirin (ECOTRIN LOW STRENGTH) 81 mg, Oral, Daily    atorvastatin (LIPITOR) 80 mg tablet TAKE 1 TABLET(80 MG) BY MOUTH DAILY WITH DINNER    Eliquis 5 mg, Oral, 2 times daily    Eliquis 5 mg, Oral, 2 times daily    ferrous sulfate 325 mg, Oral, Daily with breakfast    lisinopril (ZESTRIL) 5 mg, Oral, Daily    magnesium 30 mg, Oral    metoprolol succinate (TOPROL-XL) 50 mg 24 hr tablet TAKE 1 AND 1/2 TABLETS(75 MG) BY MOUTH DAILY    Multiple Vitamin (multivitamin) tablet 1 tablet, Oral, Daily    Omega-3 2 g, Oral, Daily    omeprazole (PRILOSEC) 20 mg, Oral, Daily        Social History     Socioeconomic History    Marital status: /Civil Union     Spouse name: Not on file    Number of children: Not on file    Years of education: Not on file    Highest education level: Not on file   Occupational History    Not on file   Tobacco Use    Smoking status: Never    Smokeless tobacco: Never   Vaping Use    Vaping Use: Never used   Substance and Sexual Activity    Alcohol use: Not Currently     Alcohol/week: 28.0 standard drinks of alcohol     Types: 14 Glasses of wine, 14 Shots of liquor per week     Comment: 2 small glasses of wine & 2 shots whiskey daily    Drug use: Not Currently    Sexual activity: Yes     Partners: Female   Other Topics Concern    Not on file   Social History Narrative    Not on file     Social Determinants of Health     Financial Resource Strain: Not on file   Food Insecurity: No Food Insecurity (10/6/2022)    Hunger Vital Sign     Worried About Running Out of Food in the Last Year: Never true     Ran Out of Food in the Last Year: Never true   Transportation Needs: No Transportation Needs (10/6/2022)    PRAPARE - Transportation     Lack of Transportation (Medical): No     Lack of Transportation (Non-Medical): No   Physical Activity: Not on file   Stress: Not on file   Social Connections: Not on file   Intimate Partner Violence: Not on file   Housing Stability: Low Risk  (10/6/2022)    Housing Stability Vital Sign     Unable to Pay for Housing in the Last Year: No     Number of Places Lived in the Last Year: 1     Unstable Housing in the Last Year: No       No family history on file.     Physical Exam:  Vitals:    10/20/23 1447   BP: 126/76   BP Location: Left arm   Patient Position: Sitting   Cuff Size: Standard   Pulse: 81   SpO2: 96%   Weight: 91.5 kg (201 lb 11.2 oz)   Height: 5' 11" (1.803 m)     Constitutional: NAD, non toxic  Ears/nose/mouth/throat: atraumatic  CV: RRR, 1/6 systolic murmur, no JVD, no HJR  Resp: ctabl  GI: Soft, NTND  MSK: no swollen joints in exposed areas  Extr: No edema, WWP  Pysche: Normal affect  Neuro: appropriate in conversation  Skin: dry and intact in exposed areas    Labs & Results:    Lab Results   Component Value Date    WBC 10.00 10/10/2022    HGB 8.5 (L) 10/10/2022    HCT 25.3 (L) 10/10/2022    MCV 95 10/10/2022     10/10/2022     Lab Results   Component Value Date    SODIUM 139 01/12/2023    K 5.0 01/12/2023     01/12/2023    CO2 31 01/12/2023    BUN 16 01/12/2023    CREATININE 0.97 01/12/2023    GLUC 87 01/12/2023    CALCIUM 9.3 01/12/2023     No results found for: "BNP"   Lab Results   Component Value Date    CHOLESTEROL 238 (H) 09/23/2022     Lab Results   Component Value Date    HDL 75 09/23/2022     Lab Results   Component Value Date    TRIG 76 09/23/2022     Lab Results   Component Value Date    [facility] 163 09/23/2022       Counseling / Coordination of Care  Time spent today 28 minutes. Greater than 50% of total time was spent with the patient and / or family counseling and / or coordination of care. We discussed diagnoses, most recent studies and any changes in treatment. Thank you for the opportunity to participate in the care of this patient.     Christiano Norton MD  Attending Physician  Advanced Heart Failure and Transplant Cardiology  [address]

## 2023-10-20 ENCOUNTER — OFFICE VISIT (OUTPATIENT)
Dept: CARDIOLOGY CLINIC | Facility: CLINIC | Age: 73
End: 2023-10-20
Payer: COMMERCIAL

## 2023-10-20 VITALS
SYSTOLIC BLOOD PRESSURE: 126 MMHG | WEIGHT: 201.7 LBS | OXYGEN SATURATION: 96 % | HEART RATE: 81 BPM | DIASTOLIC BLOOD PRESSURE: 76 MMHG | BODY MASS INDEX: 28.24 KG/M2 | HEIGHT: 71 IN

## 2023-10-20 DIAGNOSIS — I48.91 ATRIAL FIBRILLATION, UNSPECIFIED TYPE (HCC): ICD-10-CM

## 2023-10-20 DIAGNOSIS — Q23.1 BICUSPID AORTIC VALVE: ICD-10-CM

## 2023-10-20 DIAGNOSIS — Z95.1 S/P CABG X 1: ICD-10-CM

## 2023-10-20 DIAGNOSIS — I10 HYPERTENSION, UNSPECIFIED TYPE: ICD-10-CM

## 2023-10-20 DIAGNOSIS — I71.21 ANEURYSM OF ASCENDING AORTA WITHOUT RUPTURE (HCC): ICD-10-CM

## 2023-10-20 DIAGNOSIS — Z95.2 S/P AVR (AORTIC VALVE REPLACEMENT): Primary | ICD-10-CM

## 2023-10-20 PROCEDURE — 99214 OFFICE O/P EST MOD 30 MIN: CPT | Performed by: STUDENT IN AN ORGANIZED HEALTH CARE EDUCATION/TRAINING PROGRAM

## 2023-10-24 ENCOUNTER — APPOINTMENT (OUTPATIENT)
Dept: LAB | Age: 73
End: 2023-10-24
Payer: COMMERCIAL

## 2023-10-24 LAB
ANION GAP SERPL CALCULATED.3IONS-SCNC: 5 MMOL/L
BUN SERPL-MCNC: 12 MG/DL (ref 5–25)
CALCIUM SERPL-MCNC: 9 MG/DL (ref 8.4–10.2)
CHLORIDE SERPL-SCNC: 102 MMOL/L (ref 96–108)
CO2 SERPL-SCNC: 28 MMOL/L (ref 21–32)
CREAT SERPL-MCNC: 0.84 MG/DL (ref 0.6–1.3)
GFR SERPL CREATININE-BSD FRML MDRD: 85 ML/MIN/1.73SQ M
GLUCOSE P FAST SERPL-MCNC: 115 MG/DL (ref 65–99)
POTASSIUM SERPL-SCNC: 4.7 MMOL/L (ref 3.5–5.3)
SODIUM SERPL-SCNC: 135 MMOL/L (ref 135–147)

## 2023-10-27 ENCOUNTER — HOSPITAL ENCOUNTER (OUTPATIENT)
Dept: RADIOLOGY | Age: 73
Discharge: HOME | End: 2023-10-27
Payer: COMMERCIAL

## 2023-10-27 DIAGNOSIS — I71.21 ANEURYSM OF ASCENDING AORTA WITHOUT RUPTURE (HCC): ICD-10-CM

## 2023-10-27 PROCEDURE — 71275 CT ANGIOGRAPHY CHEST: CPT

## 2023-10-27 PROCEDURE — G1004 CDSM NDSC: HCPCS

## 2023-10-27 RX ADMIN — IOHEXOL 100 ML: 350 INJECTION, SOLUTION INTRAVENOUS at 13:15

## 2023-10-29 DIAGNOSIS — I48.91 ATRIAL FIBRILLATION, UNSPECIFIED TYPE (HCC): ICD-10-CM

## 2023-10-30 RX ORDER — APIXABAN 5 MG/1
5 TABLET, FILM COATED ORAL 2 TIMES DAILY
Qty: 60 TABLET | Refills: 5 | Status: SHIPPED | OUTPATIENT
Start: 2023-10-30

## 2023-11-01 NOTE — RESULT ENCOUNTER NOTE
Please notify pt his CT scan and aortic aneurysm are unchanged. Good news. We will check again in 1 year.       Thanks!    - Blas Esposito

## 2023-11-02 ENCOUNTER — TELEPHONE (OUTPATIENT)
Dept: CARDIOLOGY CLINIC | Facility: CLINIC | Age: 73
End: 2023-11-02

## 2023-11-02 NOTE — TELEPHONE ENCOUNTER
Called patient and gave results       ----- Message from Antolin Edwards MD sent at 11/1/2023  4:07 PM EDT -----  Please notify pt his CT scan and aortic aneurysm are unchanged. Good news. We will check again in 1 year.       Thanks!    - Luciano Malave

## 2023-12-21 DIAGNOSIS — Z95.1 S/P CABG X 1: ICD-10-CM

## 2023-12-21 RX ORDER — METOPROLOL SUCCINATE 50 MG/1
TABLET, EXTENDED RELEASE ORAL
Qty: 135 TABLET | Refills: 3 | Status: SHIPPED | OUTPATIENT
Start: 2023-12-21

## 2023-12-22 DIAGNOSIS — Z95.1 S/P CABG X 1: ICD-10-CM

## 2023-12-22 DIAGNOSIS — Z95.2 S/P AVR (AORTIC VALVE REPLACEMENT): ICD-10-CM

## 2023-12-22 DIAGNOSIS — Q23.1 BICUSPID AORTIC VALVE: ICD-10-CM

## 2023-12-22 RX ORDER — ATORVASTATIN CALCIUM 80 MG/1
TABLET, FILM COATED ORAL
Qty: 90 TABLET | Refills: 3 | Status: SHIPPED | OUTPATIENT
Start: 2023-12-22

## 2024-02-23 ENCOUNTER — OFFICE VISIT (OUTPATIENT)
Dept: GASTROENTEROLOGY | Facility: CLINIC | Age: 74
End: 2024-02-23
Payer: COMMERCIAL

## 2024-02-23 VITALS
DIASTOLIC BLOOD PRESSURE: 96 MMHG | HEIGHT: 71 IN | HEART RATE: 84 BPM | WEIGHT: 197.6 LBS | OXYGEN SATURATION: 98 % | BODY MASS INDEX: 27.66 KG/M2 | TEMPERATURE: 97.9 F | SYSTOLIC BLOOD PRESSURE: 148 MMHG

## 2024-02-23 DIAGNOSIS — E66.3 OVERWEIGHT (BMI 25.0-29.9): ICD-10-CM

## 2024-02-23 DIAGNOSIS — R19.8 ABDOMINAL FULLNESS: ICD-10-CM

## 2024-02-23 DIAGNOSIS — Z12.11 COLON CANCER SCREENING: ICD-10-CM

## 2024-02-23 DIAGNOSIS — R68.81 EARLY SATIETY: Primary | ICD-10-CM

## 2024-02-23 PROCEDURE — 99203 OFFICE O/P NEW LOW 30 MIN: CPT | Performed by: INTERNAL MEDICINE

## 2024-02-23 NOTE — PROGRESS NOTES
[facility] - Outpatient Consultation  Jose Damon 74 y.o. male MRN: [id number]  Encounter: [id number]    HPI:    Jose Damon is a 74 y.o. male with history of gastric bypass and AVR in 2022, on apixaban, presenting to discuss occasional abdominal fullness and early satiety.    In general, he has no problems eating, particularly at home.  Sometimes, when he is at a restaurant, he has trouble finishing his meals.  This happens if he eats greasy or creamy foods.  His weight is stable.  No dysphagia.  No nausea.  No GERD.    Abdominal months are regular.  There is no blood in his stools.  He had colonoscopies in 2017 and 2020.  There was a 5 mm TA removed in 2020 and he was told to return in 2025.    He does smoke.  He has 3 alcoholic beverages per night.  BMI is 27.56.    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     [place] 2017 - normal.  No FH of colon CA.    [place] 2020 - 5 mm polyp  Transverse colon biopsy:   Tubular adenoma.     CMP from October 2023 was normal.  A1c 6.0, TSH normal.  .   REVIEW OF SYSTEMS:  CONSTITUTIONAL: Denies any fever, chills, rigors, and weight loss.  HEENT: No earache or tinnitus. Denies hearing loss or visual disturbances.  CARDIOVASCULAR: No chest pain or palpitations.   RESPIRATORY: Denies any cough, hemoptysis, shortness of breath or dyspnea on exertion.  GASTROINTESTINAL: As noted in the History of Present Illness.   GENITOURINARY: No problems with urination. Denies any hematuria or dysuria.  NEUROLOGIC: No dizziness or vertigo, denies headaches.   MUSCULOSKELETAL: Denies any muscle or joint pain.   SKIN: Denies skin rashes or itching.   ENDOCRINE: Denies excessive thirst. Denies intolerance to heat or cold.  PSYCHOSOCIAL: Denies depression or anxiety. Denies any recent memory loss.     Historical Information   Past Medical History:   Diagnosis Date    Aortic stenosis     Ascending aortic aneurysm (HCC)     Hyperlipidemia     Hypertension     Nonrheumatic aortic valve  insufficiency     Osteoarthritis     Prostatitis      Past Surgical History:   Procedure Laterality Date    CARDIAC CATHETERIZATION N/A 9/30/2022    Procedure: Cardiac RHC/LHC;  Surgeon: Rolf Valdivia MD;  Location: [facility];  Service: Cardiology    COLONOSCOPY  08/09/2017    CORONARY ARTERY BYPASS GRAFT N/A 10/4/2022    Procedure: CABG X 1, LIMA TO LAD;  Surgeon: BRONSON Henry MD;  Location: [facility] MAIN OR;  Service: Cardiac Surgery    EYE MUSCLE SURGERY      stigmatism correction age 11    INGUINAL HERNIA REPAIR Left     IN RPLCMT AORTIC VALVE OPN W/STENTLESS TISSUE VALVE N/A 10/4/2022    Procedure: REPLACEMENT VALVE AORTIC (AVR) INSPIRIS RESILIA 25MM;  Surgeon: BRONSON Henry MD;  Location: [facility];  Service: Cardiac Surgery    SKIN CANCER EXCISION      TONSILECTOMY AND ADNOIDECTOMY       Social History   Social History     Substance and Sexual Activity   Alcohol Use Not Currently    Alcohol/week: 28.0 standard drinks of alcohol    Types: 14 Glasses of wine, 14 Shots of liquor per week    Comment: 2 small glasses of wine & 2 shots whiskey daily     Social History     Substance and Sexual Activity   Drug Use Not Currently     Social History     Tobacco Use   Smoking Status Never   Smokeless Tobacco Never     History reviewed. No pertinent family history.    Meds/Allergies       Current Outpatient Medications:     aspirin (ECOTRIN LOW STRENGTH) 81 mg EC tablet    atorvastatin (LIPITOR) 80 mg tablet    Eliquis 5 MG    Eliquis 5 MG    metoprolol succinate (TOPROL-XL) 50 mg 24 hr tablet    Multiple Vitamin (multivitamin) tablet    Omega-3 1000 MG CAPS    ascorbic acid (VITAMIN C) 500 MG tablet    Eliquis 5 MG    ferrous sulfate 325 (65 Fe) mg tablet    lisinopril (ZESTRIL) 5 mg tablet    magnesium 30 MG tablet    mupirocin (BACTROBAN) 2 % ointment    omeprazole (PriLOSEC) 20 mg delayed release capsule    Allergies   Allergen Reactions    Pollen Extract Nasal Congestion       Objective   Blood  "pressure 148/96, pulse 84, temperature 97.9 °F (36.6 °C), temperature source Tympanic, height 5' 11\" (1.803 m), weight 89.6 kg (197 lb 9.6 oz), SpO2 98%. Body mass index is 27.56 kg/m².  PHYSICAL EXAM:    General Appearance:   Alert, cooperative, no distress   HEENT:   Normocephalic, atraumatic, anicteric.     Neck:  Supple, symmetrical, trachea midline   Lungs:   Clear to auscultation bilaterally; no rales, rhonchi or wheezing; respirations unlabored    Heart::   Regular rate and rhythm; no murmur, rub, or gallop.   Abdomen:   Soft, non-tender, non-distended; normal bowel sounds; no masses, no organomegaly    Genitalia:   Deferred    Rectal:   Deferred    Extremities:  No cyanosis, clubbing or edema    Pulses:  2+ and symmetric    Skin:  No jaundice, rashes, or lesions    Lymph nodes:  No palpable cervical lymphadenopathy        Lab Results:   No visits with results within 1 Day(s) from this visit.   Latest known visit with results is:   Office Visit on 10/20/2023   Component Date Value    Sodium 10/24/2023 135     Potassium 10/24/2023 4.7     Chloride 10/24/2023 102     CO2 10/24/2023 28     ANION GAP 10/24/2023 5     BUN 10/24/2023 12     Creatinine 10/24/2023 0.84     Glucose, Fasting 10/24/2023 115 (H)     Calcium 10/24/2023 9.0     eGFR 10/24/2023 85        Lab Results   Component Value Date    WBC 10.00 10/10/2022    HGB 8.5 (L) 10/10/2022    HCT 25.3 (L) 10/10/2022    MCV 95 10/10/2022     10/10/2022       Lab Results   Component Value Date    SODIUM 137 10/31/2023    K 4.3 10/31/2023     10/31/2023    CO2 28 10/31/2023    AGAP 8 10/31/2023    BUN 10 10/31/2023    CREATININE 0.87 10/31/2023    GLUC 114 (H) 10/31/2023    GLUF 115 (H) 10/24/2023    CALCIUM 9.1 10/31/2023    AST 24 10/31/2023    ALT 32 10/31/2023    ALKPHOS 42 10/31/2023    TP 6.6 10/31/2023    TBILI 0.6 10/31/2023    EGFR 90 10/31/2023       No results found for: \"CRP\"    Lab Results   Component Value Date    [id number] 3.180 " "01/12/2023    TSH 2.24 10/31/2023       No results found for: \"IRON\", \"TIBC\", \"FERRITIN\"    Radiology Results:   No results found.    ______________________________________________________________________  ASSESSMENT AND PLAN:    Jose Damon is a 74 y.o. male with history of CABG and AVR on chronic anticoagulation, presenting with occasional abdominal fullness and early satiety.  This is associated with eating creamy sauces or greasy food.  He has no trouble with eating at home.  No weight loss.  He is up-to-date with colon cancer screening.  No GERD or dysphagia.  He is overweight and has 21 alcoholic beverages per week.    I do not think he needs further workup at this time.  I do not think he has true early satiety.  He may be a close intolerant.  He is overweight and has slightly heavy alcohol use.  He is at risk for metabolic syndrome including TODD.  He has CAD and has already required CABG.  A1c is elevated.  We discussed this.    1.  Recommend decreasing alcohol consumption to 1 glass of wine per night.  2.  Stated with regularly scheduled screening colonoscopy in 2025.  3.  Return in 6 months.  If he continues to have problems, we can schedule EGD.    1. Early satiety    2. Abdominal fullness    3. Colon cancer screening    4. Overweight (BMI 25.0-29.9)        No orders of the defined types were placed in this encounter.    "

## 2024-04-17 NOTE — PROGRESS NOTES
"Outpatient Cardiology Consult Note - Jose Damon 74 y.o. male MRN: [id number]    @ Encounter: [id number]    Assessment:  74 y.o. male Hx per chart I initially met him in 2022 when he p/w second opinion for timing of intervention for Ao aneurysm, BAV, severe AS, moderate AI; was followed at [facility] in past, now s/p 10/2022 bAVR+CABG as below.    [facility] male   at [facility]  Bicuspid Ao valve, severe AS, Moderate AI. S/p 10/4/2022 bioAVR and CABGx1 D[facility]-LAD, Dr Henry  Asc Ao aneurysm 4.6cm on CT, 4.3cm by echo in 2022. 10/2023 CTA chest: Unchanged ascending thoracic aortic dilatation measuring 4.2 x 4.6 cm.   Preserved LVEF  Recurrent post op AF inpt and then subsequently as outpt about 1-2 months after surgery, on AC  LVH, IVSd 1.8cm, intracavitary gradient on intraop YOBANY  L hip OA  Pre DM  HTN  HLD  Etoh use abuse - has 4 etoh drinks daily          Lab Units 10/31/23  0845 10/24/23  0905 04/11/23  0926   CREATININE mg/dL 0.87 0.84 0.94         Lab Results   Component Value Date    K 4.3 10/31/2023     Lab Results   Component Value Date    HGBA1C 6.0 (H) 10/31/2023     Lab Results   Component Value Date    GUV7EMLCHWNY 3.180 01/12/2023    TSH 2.24 10/31/2023     Lab Results   Component Value Date    LDLCALC 148 (H) 09/23/2022     No results found for: \"BNP\"   No results found for: \"NTBNP\"        Invalid input(s): \"02HGBVB\"    The 10-year ASCVD risk score (Pily LUONG, et al., 2019) is: 27.5%    Values used to calculate the score:      Age: 74 years      Sex: Male      Is Non- : No      Diabetic: No      Tobacco smoker: No      Systolic Blood Pressure: 126 mmHg      Is BP treated: Yes      HDL Cholesterol: 75 mg/dL      Total Cholesterol: 238 mg/dL    TODAY's PLAN:     04/19/24  Warm, euvolemic  No new cardiac complaints, feels generally well  Remains active, teaches many hours a day, exercise limited by hip pain    Reduce current etoh 3-4 drinks qd > he is working on " this    Repeat lipid future, after etoh reduction    Pre op evaluation:  - the patient is low-moderate risk for non-cardiac surgery  - the patient likely can complete 4 mets exertion without issue though his hip pain limits  - no evidence of acute cardiac issue including decompensated HF, uncontrolled arrhythmia, severe valvulopathy, or an ACS  - the patient is currently optimized from cardiac perspective  - no further cardiac testing planned at this time  - continue current home medications with any babita-op modifications per anesthesia/surgery teams  - continue bblocker uninterrupted, if taking one  - AC plan: can hold eliquis 48 hours before surgery. Resume as soon as able per surgery team  - Cw ASA uninterrupted    CTA Aorta for aneurysm screening - next check around 10/2024    cw ASA  cw eliquis 5 bid  cw atorva 80 daily    Up toprol xl 75 qd > 50 bid    Cw lisinopril 5 qd    Key info from my prior notes:    Advised stay well hydrated, long discussion today    Studies:    EKG:  Personally reviewed by me.  10/20/22: nsr, 1oavb, nl axis, LVH, non specific STT changes  8/18/22: nsr, nl axis, LVH, LAE, anterior Q waves, non specific STT changes  1/5/23: nsr, nl axis, non specific STT changes  4/14/23: nsr, nl axis, borderline LVH, non specific STT changes    10/4/22 intraop YOBANY:  Echocardiographic and Doppler Measurements     PREPROCEDURE     LEFT VENTRICLE:  Systolic Function: normal. Ejection Fraction: 60%.         RIGHT VENTRICLE:  Systolic Function: normal.  Cavity size normal.       AORTIC VALVE:  Leaflets: fusion oc LCC and RCC; severely calcified and bileaflet.   Mean Gradient: 60 mmHg. Peak Gradient: 90 mmHg. Maximum velocity (cm/s): 4.7 m/sec.  Regurgitation: moderate. Pressure Half-time: 474 ms.      MITRAL VALVE:  Leaflets: calcified and normal. Leaflet Motions: chordal CHRISTIANO. Regurgitation: mild.           TRICUSPID VALVE:  Leaflets: normal.   Regurgitation: mild.     ASCENDING AORTA:   Diameter (cm):  "4.4-4.5 cm (YOBANY and epi-aortic) Dissection not present.       AORTIC ARCH:    dissection not present. Grade 3: atheroma protruding < 0.5 cm into lumen.     DESCENDING AORTA:    Dissection not present. Grade 3: atheroma protruding < 0.5 cm into lumen.           RIGHT ATRIUM:   No spontaneous echo contrast.     LEFT ATRIUM:   No spontaneous echo contrast.     LEFT ATRIAL APPENDAGE:   No spontaneous echo contrast Emptying Velocity: 41 cm/sec.     OTHER ATRIAL FINDINGS:  No JAY clot.     ATRIAL SEPTUM:  Intra-atrial septal morphology: no PFO by CFD.       EPIAORTIC:  Plaque Thickness: 0-5 mm.     POSTPROCEDURE     LEFT VENTRICLE: Unchanged .          RIGHT VENTRICLE:   Systolic Function: top normal.      AORTIC VALVE:   Leaflets: bioprosthetic valve in aortic position, well-seated, does not rock, good leaflet excursion and closure, no PVL and bioprosthetic.  Mean Gradient: not accurate, given presence iof CHRISTIANO - gradient across AV/LVOT 15 mmHg (\"dagger shape, sugesting LVOTO)        MITRAL VALVE:    Regurgitation: mild-mod; significant chordal CHRISTIANO remains; gradient across LVOT/AV 15 mmHg.       TRICUSPID VALVE: Unchanged .    10/2022 AV path:  Final Diagnosis   A. Heart Valve, aortic valve:  - Senile calcific degeneration of three cardiac valvular leaflets, consistent with senile calcific aortic stenosis.  - Van Giesson elastin stains & trichrome stains demonstrate preservation of the usual layered valvular architecture with bulky calcifications centered in the fibrosa layer without neovascularization, acute inflammation or destructive fibrinous changes.     [facility] 9/30/22:     Mid LAD lesion is 80% stenosed.     Single vessel disease, with an 80% stenosis of the LAD immediately distal to the D1 bifurcation.  Plan: surgical AVR, possible aneurysm repair.    EST 8/29/22:    Resting ECG: There is left ventricular hypertrophy without strain. There is a myocardial infarction located in the anteroseptal region.    Blood pressure " demonstrated a hypotensive response and heart rate demonstrated a normal response to stress.    Stress ECG: Horizontal ST depression of 1.0 mm in the inferior leads is noted. The ECG was positive for ischemia. The stress ECG is consistent with ischemia after maximal exercise, without reproduction of symptoms.    8/25/22 TTE:    Left Ventricle: Left ventricular cavity size is normal. Wall thickness is moderate to severely increased. There is moderate concentric hypertrophy. There is severe asymmetric hypertrophy of the septal wall. The left ventricular ejection fraction is 70%. Systolic function is vigorous. Wall motion is normal. Diastolic function is mildly abnormal, consistent with grade I (abnormal) relaxation.    Left Atrium: The atrium is mildly dilated.    Aortic Valve: The aortic valve is functionally bicuspid with commissural fusion of the right-left coronary cusp. The leaflets are severely thickened. The leaflets are moderately calcified. There is severely reduced mobility. There is moderate regurgitation. There is severe stenosis. The aortic valve peak velocity is 4.34 m/s. The aortic valve mean gradient is 49 mmHg. The dimensionless velocity index is 0.30. The aortic valve area is 0.85 cm2.    Mitral Valve: There is mild to moderate annular calcification. There is mild regurgitation.    Tricuspid Valve: There is mild regurgitation.    Pulmonic Valve: There is mild regurgitation.    Aorta: The aortic root is mildly dilated. The ascending aorta is mildly dilated. The aortic root is 4.10 cm. The ascending aorta is 4.2 cm.  IVSd 1.8cm    9/23/22 CT chest:  IMPRESSION:     Unchanged ascending thoracic aortic aneurysm measuring 4.6 cm.     Previous lung nodules are smaller, less prominent and groundglass compared to solid appearance.    LVHN data:  TTE (1/2022):   Left Ventricle: There is severe concentric hypertrophy. Systolic function is normal with an ejection fraction of 60-65%. There is grade I (mild)  diastolic dysfunction and normal left atrial pressure.   Left Atrium: Left atrium cavity is moderately dilated.   Aortic Valve: There is at least moderate regurgitation. There is severe stenosis.   Ascending Aorta: The aortic root is mildly dilated. The ascending aorta is mildly dilated.   Tricuspid Valve: There is mild regurgitation.    CT chest (9/21):  Stable pulmonary nodularity at 3 years follow-up.     Heavy aortic valve calcification with fusiform ascending aortic aneurysm  Unchanged    Direct review of study demonstrates unchanged appearance of aorta measuring approximately 4.5 cm in maximum diameter.    CT chest 9/2021  Heavy aortic valve calcification with fusiform ascending aortic aneurysm  Unchanged 4.5 cm     HPI:   72 y.o. male Hx per chart p/w second opinion for timing of intervention for Ao aneurysm, BAV, severe AS, moderate AI; follows at [facility]. EF preserved and LV not dilated per OSH TTE. 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 Syncope in heat ovr 20 years ago, none since. Feels good energy level. Reports no progression of cardiac sx or fatigue in recent months/years.  No new CP/SOB/dizziness/palpitations/syncope.  No new leg swelling, PND, pillow orthopnea, unintentional weight changes.    11/2021 OSH cards note:  Stable at 4.5-4.6 cm for the past 2-3 years. Follow up with CTS. Aware of ~5 cm threshold for surgery given his BAV. He will avoid heavy lifting and exertion. BP is controlled    Home meds:   aspirin 81 MG EC tablet Take 81 mg by mouth daily.    cartilage/collagen/bor/hyalur (JOINT HEALTH ORAL) Take 120 capsules by mouth.    lisinopriL (PRINIVIL,ZESTRIL) 20 MG tablet TAKE 1 TABLET BY MOUTH DAILY 90 tablet 3    magnesium oxide 200 mg magnesium chew Take by mouth.  Magnesium w/ b6    multivitamin (THERAGRAN) tab Take 1 tablet by mouth daily.    SAW PALMETTO FRUIT ORAL Take 1 tablet by mouth daily.    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 He drinks 2-3 glasses of red wine nightly. He is originally from [facility].     No current tobacco use, alcohol abuse, illicit drug use.    Interval Hx:  As noted in 'plan' section above and prior epic chart notes.    No new CP/SOB/dizziness/palpitations/syncope.  No new fatigue.  No new unintentional weight changes.  No new leg swelling, PND, pillow orthopnea.  No new fevers, chills, cough, nausea, vomiting, diarrhea, dysuria.    4/14/23:  Warm, euvolemic  Feels very well  ECG - NSR today  Lipids better in care everywhere    BP remains above target - restart prior lisinopril but at lower dose of 5mg qd  Fu BMP in 1 week  No new CP/SOB/dizziness/palpitations/syncope.  No new leg swelling, PND, pillow orthopnea, unintentional weight changes, fatigue.  No new fevers, chills, cough, nausea, vomiting, diarrhea, dysuria.  No bleeding    Last visit:  Lengthy discussion with pt 9/1/22, regarding calcified BAV, Asc Ao aneurysm 4.6cm by outside CT scan (stable size), recent EST with hypotensive exercise response, dizziness, ischemic ECG.    S/p bioAVR and CABGx1 10/4/22, doing well since  Warm/dry, euvolemic today; RRR on exam  Ambulating further, using stairs, doing all ADLs, mild SABILLON ongoing but improving    No new CP/dizziness/palpitations/syncope.  No new leg swelling, PND, pillow orthopnea, unintentional weight changes, fatigue.  No new fevers, chills, cough, nausea, vomiting, diarrhea, dysuri      Past Medical History:   Diagnosis Date    Aortic stenosis     Ascending aortic aneurysm (HCC)     Hyperlipidemia     Hypertension     Nonrheumatic aortic valve insufficiency     Osteoarthritis     Prostatitis       Patient Active Problem List   Diagnosis    Nonrheumatic aortic valve stenosis    Ascending aortic aneurysm    Nonrheumatic aortic valve insufficiency    Pre-diabetes    Hyperlipidemia    Bicuspid aortic valve    S/P CABG x 1    Coronary artery disease    S/P AVR (aortic valve replacement)    Anticoagulated on Coumadin    Postoperative atrial fibrillation (HCC)    Hyponatremia    Postoperative anemia due to acute blood loss    Thrombocytopenia (HCC)    Leukocytosis       ROS:  10 point ROS negative except as specified in HPI    Allergies   Allergen Reactions    Pollen Extract Nasal Congestion       Current Outpatient Medications   Medication Instructions    ascorbic acid (VITAMIN C) 500 mg, Oral, Daily    aspirin (ECOTRIN LOW STRENGTH) 81 mg, Oral, Daily    atorvastatin (LIPITOR) 80 mg tablet TAKE 1 TABLET(80 MG) BY MOUTH DAILY WITH DINNER    Eliquis 5 mg, Oral, 2 times daily    Eliquis 5 mg, Oral, 2 times daily    Eliquis 5 mg, Oral, 2 times daily    ferrous sulfate 325 mg, Oral, Daily with breakfast    lisinopril (ZESTRIL) 5 mg, Oral, Daily    magnesium 30 mg    metoprolol succinate (TOPROL-XL) 100 mg, Oral, Daily    Multiple Vitamin (multivitamin) tablet 1 tablet, Oral, Daily    mupirocin (BACTROBAN) 2 % ointment APPLY EVERY DAY TO TWICE DAILY TO SURGICAL WOUND FOR 1-2 WEEKS UNTIL COMPLETELY HEALED    Omega-3 2 g, Oral, Daily    omeprazole (PRILOSEC) 20 mg, Oral, Daily        Social History     Socioeconomic History    Marital status: /Civil Union     Spouse name: Not on file    Number of children: Not on file    Years of education: Not on file    Highest education level: Not on file   Occupational History    Not on file   Tobacco Use    Smoking status: Never    Smokeless tobacco: Never   Vaping Use    Vaping status: Never Used   Substance and Sexual Activity    Alcohol use: Not Currently     Alcohol/week: 28.0 standard drinks of alcohol     Types: 14 Glasses of wine, 14 Shots of liquor per week      "Comment: 2 small glasses of wine & 2 shots whiskey daily    Drug use: Not Currently    Sexual activity: Yes     Partners: Female   Other Topics Concern    Not on file   Social History Narrative    Not on file     Social Determinants of Health     Financial Resource Strain: Not on file   Food Insecurity: No Food Insecurity (10/6/2022)    Hunger Vital Sign     Worried About Running Out of Food in the Last Year: Never true     Ran Out of Food in the Last Year: Never true   Transportation Needs: No Transportation Needs (10/6/2022)    PRAPARE - Transportation     Lack of Transportation (Medical): No     Lack of Transportation (Non-Medical): No   Physical Activity: Not on file   Stress: Not on file   Social Connections: Not on file   Intimate Partner Violence: Not on file   Housing Stability: Low Risk  (10/6/2022)    Housing Stability Vital Sign     Unable to Pay for Housing in the Last Year: No     Number of Places Lived in the Last Year: 1     Unstable Housing in the Last Year: No       No family history on file.    Physical Exam:  Vitals:    04/19/24 1303   BP: 126/76   BP Location: Left arm   Patient Position: Sitting   Cuff Size: Standard   Pulse: 88   SpO2: 98%   Weight: 90.7 kg (200 lb)   Height: 5' 11\" (1.803 m)     Constitutional: NAD, non toxic  Ears/nose/mouth/throat: atraumatic  CV: RRR, 1/6 systolic murmur, no JVD, no HJR  Resp: ctabl  GI: Soft, NTND  MSK: no swollen joints in exposed areas  Extr: No edema, WWP  Pysche: Normal affect  Neuro: appropriate in conversation  Skin: dry and intact in exposed areas    Labs & Results:    Lab Results   Component Value Date    WBC 10.00 10/10/2022    HGB 8.5 (L) 10/10/2022    HCT 25.3 (L) 10/10/2022    MCV 95 10/10/2022     10/10/2022     Lab Results   Component Value Date    SODIUM 137 10/31/2023    K 4.3 10/31/2023     10/31/2023    CO2 28 10/31/2023    BUN 10 10/31/2023    CREATININE 0.87 10/31/2023    GLUC 114 (H) 10/31/2023    CALCIUM 9.1 10/31/2023 " "    No results found for: \"BNP\"   Lab Results   Component Value Date    CHOLESTEROL 238 (H) 09/23/2022     Lab Results   Component Value Date    HDL 75 09/23/2022     Lab Results   Component Value Date    TRIG 76 09/23/2022     Lab Results   Component Value Date    NONHDLC 163 09/23/2022       Counseling / Coordination of Care  Time spent today 29 minutes.  Greater than 50% of total time was spent with the patient and / or family counseling and / or coordination of care. We discussed diagnoses, most recent studies and any changes in treatment.    Thank you for the opportunity to participate in the care of this patient.    Long Marmolejo MD  Attending Physician  Advanced Heart Failure and Transplant Cardiology  [facility]  "

## 2024-04-19 ENCOUNTER — OFFICE VISIT (OUTPATIENT)
Dept: CARDIOLOGY CLINIC | Facility: CLINIC | Age: 74
End: 2024-04-19
Payer: COMMERCIAL

## 2024-04-19 VITALS
HEART RATE: 88 BPM | SYSTOLIC BLOOD PRESSURE: 126 MMHG | HEIGHT: 71 IN | DIASTOLIC BLOOD PRESSURE: 76 MMHG | BODY MASS INDEX: 28 KG/M2 | WEIGHT: 200 LBS | OXYGEN SATURATION: 98 %

## 2024-04-19 DIAGNOSIS — I71.21 ANEURYSM OF ASCENDING AORTA WITHOUT RUPTURE (HCC): ICD-10-CM

## 2024-04-19 DIAGNOSIS — I10 HYPERTENSION, UNSPECIFIED TYPE: ICD-10-CM

## 2024-04-19 DIAGNOSIS — Z95.1 S/P CABG X 1: ICD-10-CM

## 2024-04-19 DIAGNOSIS — Z01.818 PRE-OP EXAM: Primary | ICD-10-CM

## 2024-04-19 DIAGNOSIS — Q23.1 BICUSPID AORTIC VALVE: ICD-10-CM

## 2024-04-19 DIAGNOSIS — Z95.2 S/P AVR (AORTIC VALVE REPLACEMENT): ICD-10-CM

## 2024-04-19 DIAGNOSIS — I48.91 ATRIAL FIBRILLATION, UNSPECIFIED TYPE (HCC): ICD-10-CM

## 2024-04-19 PROCEDURE — 99214 OFFICE O/P EST MOD 30 MIN: CPT | Performed by: STUDENT IN AN ORGANIZED HEALTH CARE EDUCATION/TRAINING PROGRAM

## 2024-04-19 PROCEDURE — 93000 ELECTROCARDIOGRAM COMPLETE: CPT | Performed by: STUDENT IN AN ORGANIZED HEALTH CARE EDUCATION/TRAINING PROGRAM

## 2024-04-19 RX ORDER — METOPROLOL SUCCINATE 100 MG/1
100 TABLET, EXTENDED RELEASE ORAL DAILY
Qty: 90 TABLET | Refills: 5 | Status: SHIPPED | OUTPATIENT
Start: 2024-04-19 | End: 2025-10-11

## 2024-05-06 DIAGNOSIS — I10 HYPERTENSION, UNSPECIFIED TYPE: ICD-10-CM

## 2024-05-06 RX ORDER — LISINOPRIL 5 MG/1
5 TABLET ORAL DAILY
Qty: 30 TABLET | Refills: 3 | Status: SHIPPED | OUTPATIENT
Start: 2024-05-06 | End: 2024-09-03

## 2024-05-31 DIAGNOSIS — I48.91 ATRIAL FIBRILLATION, UNSPECIFIED TYPE (HCC): ICD-10-CM

## 2024-05-31 RX ORDER — APIXABAN 5 MG/1
5 TABLET, FILM COATED ORAL 2 TIMES DAILY
Qty: 60 TABLET | Refills: 0 | Status: SHIPPED | OUTPATIENT
Start: 2024-05-31

## 2024-05-31 NOTE — TELEPHONE ENCOUNTER
Patient needs updated blood work. Please place orders.   A 30D  courtesy refill was provided.

## 2024-08-13 ENCOUNTER — TELEPHONE (OUTPATIENT)
Dept: GASTROENTEROLOGY | Facility: CLINIC | Age: 74
End: 2024-08-13

## 2024-08-13 NOTE — TELEPHONE ENCOUNTER
I called the pt and left the messages regarding cancel the apt. 10/11/24 & re- schedule. Provider callback number.

## 2024-09-18 ENCOUNTER — OFFICE VISIT (OUTPATIENT)
Dept: UROLOGY | Facility: AMBULATORY SURGERY CENTER | Age: 74
End: 2024-09-18
Payer: COMMERCIAL

## 2024-09-18 VITALS
DIASTOLIC BLOOD PRESSURE: 80 MMHG | BODY MASS INDEX: 27.16 KG/M2 | OXYGEN SATURATION: 97 % | HEART RATE: 82 BPM | WEIGHT: 194 LBS | HEIGHT: 71 IN | SYSTOLIC BLOOD PRESSURE: 126 MMHG

## 2024-09-18 DIAGNOSIS — Z12.5 SCREENING FOR PROSTATE CANCER: Primary | ICD-10-CM

## 2024-09-18 PROCEDURE — 99213 OFFICE O/P EST LOW 20 MIN: CPT

## 2024-09-18 RX ORDER — AMOXICILLIN 500 MG/1
TABLET, FILM COATED ORAL
COMMUNITY
Start: 2024-06-21

## 2024-09-18 NOTE — ASSESSMENT & PLAN NOTE
Patient has a history of elevated PSA that was related to an episode of prostatitis  Patient previously underwent 2 prostate biopsies through [facility] that were negative for prostate malignancy  Patient's most recent PSA was performed 9/20/2023 and found to be 1.02.  ETHEL performed today.  Prostate palpably benign.  Refer to physical exam findings.  Patient will obtain a new PSA within the next 1 to 2 months with his regular blood work through his PCP.  Patient will return to the office in 1 year to continue to monitor for prostate cancer and undergo a ETHEL at that time.    Lab Results   Component Value Date    PSA 1.02 09/20/2023    PSA 1.4 09/23/2022

## 2024-09-18 NOTE — PROGRESS NOTES
9/18/2024      Assessment and Plan    74 y.o. male managed by Dr. Delgado    Screening for prostate cancer  Patient has a history of elevated PSA that was related to an episode of prostatitis  Patient previously underwent 2 prostate biopsies through [facility] that were negative for prostate malignancy  Patient's most recent PSA was performed 9/20/2023 and found to be 1.02.  ETHEL performed today.  Prostate palpably benign.  Refer to physical exam findings.  Patient will obtain a new PSA within the next 1 to 2 months with his regular blood work through his PCP.  Patient will return to the office in 1 year to continue to monitor for prostate cancer and undergo a ETHEL at that time.    Lab Results   Component Value Date    PSA 1.02 09/20/2023    PSA 1.4 09/23/2022            History of Present Illness  Jose Damon is a 74 y.o. male here for evaluation of prostate cancer screening.  Patient is a  analytics and "[employer]  Patient was last seen in the office on 9/15/2023.  Patient has a history of previously elevated PSA and gross hematuria, but likely associated with prostatitis.  Patient underwent CT urogram and cystoscopy through [facility] in 2017 which were normal.  Patient also has a history of prostate biopsy in 2011 which did not reveal any malignancy.  Patient's most recent PSA was performed 9/20/2023 and found to be 1.02.  Patient takes saw palmetto as a supplement for his lower urinary tract symptoms and is happy with this.  Today, the patient reports no new lower urinary tract complaints.  Patient denies any new episodes of prostatitis or gross hematuria since his last office visit.  Patient is currently content with his voiding pattern.  Patient notes that he just underwent a left hip replacement and is planning to undergo a right hip replacement in January 2025.        Review of Systems   Constitutional:  Negative for chills and fever.   HENT:  Negative for ear pain and sore  "throat.    Eyes:  Negative for pain and visual disturbance.   Respiratory:  Negative for cough and shortness of breath.    Cardiovascular:  Negative for chest pain and palpitations.   Gastrointestinal:  Negative for abdominal pain and vomiting.   Genitourinary:  Negative for decreased urine volume, difficulty urinating, dysuria, flank pain, frequency, hematuria and urgency.   Musculoskeletal:  Negative for arthralgias and back pain.   Skin:  Negative for color change and rash.   Neurological:  Negative for seizures and syncope.   All other systems reviewed and are negative.               Vitals  Vitals:    09/18/24 0904   BP: 126/80   BP Location: Left arm   Patient Position: Sitting   Cuff Size: Standard   Pulse: 82   SpO2: 97%   Weight: 88 kg (194 lb)   Height: 5' 11\" (1.803 m)       Physical Exam  Vitals reviewed.   Constitutional:       General: He is not in acute distress.     Appearance: Normal appearance. He is not ill-appearing.   HENT:      Head: Normocephalic and atraumatic.      Nose: Nose normal.   Eyes:      General: No scleral icterus.  Pulmonary:      Effort: No respiratory distress.   Abdominal:      General: Abdomen is flat. There is no distension.      Palpations: Abdomen is soft.      Tenderness: There is no abdominal tenderness.   Genitourinary:     Comments: ETHEL performed today.  Prostate estimated to be about 50 g and smooth bilaterally without nodularity or induration.  Musculoskeletal:         General: Normal range of motion.      Cervical back: Normal range of motion.   Skin:     General: Skin is warm.      Coloration: Skin is not jaundiced.   Neurological:      Mental Status: He is alert and oriented to person, place, and time.      Gait: Gait normal.   Psychiatric:         Mood and Affect: Mood normal.         Behavior: Behavior normal.           Past History  Past Medical History:   Diagnosis Date    Aortic stenosis     Ascending aortic aneurysm (HCC)     Hyperlipidemia     Hypertension  "    Nonrheumatic aortic valve insufficiency     Osteoarthritis     Prostatitis      Social History     Socioeconomic History    Marital status: /Civil Union     Spouse name: None    Number of children: None    Years of education: None    Highest education level: None   Occupational History    None   Tobacco Use    Smoking status: Never    Smokeless tobacco: Never   Vaping Use    Vaping status: Never Used   Substance and Sexual Activity    Alcohol use: Not Currently     Alcohol/week: 28.0 standard drinks of alcohol     Types: 14 Glasses of wine, 14 Shots of liquor per week     Comment: 2 small glasses of wine & 2 shots whiskey daily    Drug use: Not Currently    Sexual activity: Yes     Partners: Female   Other Topics Concern    None   Social History Narrative    None     Social Determinants of Health     Financial Resource Strain: Low Risk  (5/23/2024)    Received from [facility]    Overall Financial Resource Strain (CARDIA)     Difficulty of Paying Living Expenses: Not hard at all   Food Insecurity: No Food Insecurity (5/23/2024)    Received from [facility]    Hunger Vital Sign     Worried About Running Out of Food in the Last Year: Never true     Ran Out of Food in the Last Year: Never true   Transportation Needs: No Transportation Needs (5/23/2024)    Received from [facility]    PRAPARE - Transportation     Lack of Transportation (Medical): No     Lack of Transportation (Non-Medical): No   Physical Activity: Not on file   Stress: Not on file   Social Connections: Not on file   Intimate Partner Violence: Not At Risk (5/23/2024)    Received from [facility]    Humiliation, Afraid, Rape, and Kick questionnaire     Fear of Current or Ex-Partner: No     Emotionally Abused: No     Physically Abused: No     Sexually Abused: No   Housing Stability: Low Risk  (10/6/2022)    Housing Stability Vital Sign     Unable to Pay for Housing in the Last  Year: No     Number of Places Lived in the Last Year: 1     Unstable Housing in the Last Year: No     Social History     Tobacco Use   Smoking Status Never   Smokeless Tobacco Never     History reviewed. No pertinent family history.    The following portions of the patient's history were reviewed and updated as appropriate: allergies, current medications, past medical history, past social history, past surgical history and problem list.    Results  No results found for this or any previous visit (from the past 1 hour(s)).]  Lab Results   Component Value Date    PSA 1.02 09/20/2023    PSA 1.4 09/23/2022     Lab Results   Component Value Date    GLUCOSE 146 (H) 10/04/2022    CALCIUM 8.0 (L) 05/24/2024    K 4.0 05/24/2024    CO2 26 05/24/2024     05/24/2024    BUN 10 05/24/2024    CREATININE 0.80 05/24/2024     Lab Results   Component Value Date    WBC 10.00 10/10/2022    HGB 13.2 05/24/2024    HCT 38.8 05/24/2024    MCV 95 10/10/2022     10/10/2022

## 2024-10-15 DIAGNOSIS — I48.91 ATRIAL FIBRILLATION, UNSPECIFIED TYPE (HCC): ICD-10-CM

## 2024-10-16 RX ORDER — APIXABAN 5 MG/1
5 TABLET, FILM COATED ORAL 2 TIMES DAILY
Qty: 60 TABLET | Refills: 5 | Status: SHIPPED | OUTPATIENT
Start: 2024-10-16

## 2024-10-18 PROBLEM — Z12.5 SCREENING FOR PROSTATE CANCER: Status: RESOLVED | Noted: 2024-09-18 | Resolved: 2024-10-18

## 2024-11-08 ENCOUNTER — APPOINTMENT (OUTPATIENT)
Dept: LAB | Age: 74
End: 2024-11-08
Payer: COMMERCIAL

## 2024-11-08 DIAGNOSIS — E78.2 MIXED HYPERLIPIDEMIA: ICD-10-CM

## 2024-11-08 DIAGNOSIS — I10 ESSENTIAL HYPERTENSION: ICD-10-CM

## 2024-11-08 DIAGNOSIS — R73.03 PREDIABETES: ICD-10-CM

## 2024-11-08 DIAGNOSIS — Z12.5 SCREENING FOR PROSTATE CANCER: ICD-10-CM

## 2024-11-08 LAB
ALBUMIN SERPL BCG-MCNC: 4.3 G/DL (ref 3.5–5)
ALP SERPL-CCNC: 42 U/L (ref 34–104)
ALT SERPL W P-5'-P-CCNC: 25 U/L (ref 7–52)
ANION GAP SERPL CALCULATED.3IONS-SCNC: 7 MMOL/L (ref 4–13)
AST SERPL W P-5'-P-CCNC: 26 U/L (ref 13–39)
BASOPHILS # BLD AUTO: 0.03 THOUSANDS/ÂΜL (ref 0–0.1)
BASOPHILS NFR BLD AUTO: 1 % (ref 0–1)
BILIRUB SERPL-MCNC: 0.69 MG/DL (ref 0.2–1)
BUN SERPL-MCNC: 14 MG/DL (ref 5–25)
CALCIUM SERPL-MCNC: 8.9 MG/DL (ref 8.4–10.2)
CHLORIDE SERPL-SCNC: 103 MMOL/L (ref 96–108)
CHOLEST SERPL-MCNC: 133 MG/DL
CO2 SERPL-SCNC: 27 MMOL/L (ref 21–32)
CREAT SERPL-MCNC: 0.83 MG/DL (ref 0.6–1.3)
EOSINOPHIL # BLD AUTO: 0.18 THOUSAND/ÂΜL (ref 0–0.61)
EOSINOPHIL NFR BLD AUTO: 3 % (ref 0–6)
ERYTHROCYTE [DISTWIDTH] IN BLOOD BY AUTOMATED COUNT: 13.4 % (ref 11.6–15.1)
EST. AVERAGE GLUCOSE BLD GHB EST-MCNC: 131 MG/DL
GFR SERPL CREATININE-BSD FRML MDRD: 85 ML/MIN/1.73SQ M
GLUCOSE P FAST SERPL-MCNC: 125 MG/DL (ref 65–99)
HBA1C MFR BLD: 6.2 %
HCT VFR BLD AUTO: 45.9 % (ref 36.5–49.3)
HDLC SERPL-MCNC: 57 MG/DL
HGB BLD-MCNC: 15.2 G/DL (ref 12–17)
IMM GRANULOCYTES # BLD AUTO: 0.03 THOUSAND/UL (ref 0–0.2)
IMM GRANULOCYTES NFR BLD AUTO: 1 % (ref 0–2)
LDLC SERPL CALC-MCNC: 63 MG/DL (ref 0–100)
LYMPHOCYTES # BLD AUTO: 1.64 THOUSANDS/ÂΜL (ref 0.6–4.47)
LYMPHOCYTES NFR BLD AUTO: 25 % (ref 14–44)
MCH RBC QN AUTO: 31.2 PG (ref 26.8–34.3)
MCHC RBC AUTO-ENTMCNC: 33.1 G/DL (ref 31.4–37.4)
MCV RBC AUTO: 94 FL (ref 82–98)
MONOCYTES # BLD AUTO: 0.63 THOUSAND/ÂΜL (ref 0.17–1.22)
MONOCYTES NFR BLD AUTO: 10 % (ref 4–12)
NEUTROPHILS # BLD AUTO: 3.98 THOUSANDS/ÂΜL (ref 1.85–7.62)
NEUTS SEG NFR BLD AUTO: 60 % (ref 43–75)
NONHDLC SERPL-MCNC: 76 MG/DL
NRBC BLD AUTO-RTO: 0 /100 WBCS
PLATELET # BLD AUTO: 161 THOUSANDS/UL (ref 149–390)
PMV BLD AUTO: 12.1 FL (ref 8.9–12.7)
POTASSIUM SERPL-SCNC: 4.6 MMOL/L (ref 3.5–5.3)
PROT SERPL-MCNC: 6.7 G/DL (ref 6.4–8.4)
PSA SERPL-MCNC: 1.09 NG/ML (ref 0–4)
RBC # BLD AUTO: 4.87 MILLION/UL (ref 3.88–5.62)
SODIUM SERPL-SCNC: 137 MMOL/L (ref 135–147)
TRIGL SERPL-MCNC: 67 MG/DL
TSH SERPL DL<=0.05 MIU/L-ACNC: 2.28 UIU/ML (ref 0.45–4.5)
WBC # BLD AUTO: 6.49 THOUSAND/UL (ref 4.31–10.16)

## 2024-11-08 PROCEDURE — 83036 HEMOGLOBIN GLYCOSYLATED A1C: CPT

## 2024-11-08 PROCEDURE — 80061 LIPID PANEL: CPT

## 2024-11-08 PROCEDURE — 85025 COMPLETE CBC W/AUTO DIFF WBC: CPT

## 2024-11-08 PROCEDURE — 84443 ASSAY THYROID STIM HORMONE: CPT

## 2024-11-08 PROCEDURE — 36415 COLL VENOUS BLD VENIPUNCTURE: CPT

## 2024-11-08 PROCEDURE — G0103 PSA SCREENING: HCPCS

## 2024-11-08 PROCEDURE — 80053 COMPREHEN METABOLIC PANEL: CPT

## 2024-11-11 ENCOUNTER — TELEPHONE (OUTPATIENT)
Dept: UROLOGY | Facility: AMBULATORY SURGERY CENTER | Age: 74
End: 2024-11-11

## 2024-11-11 DIAGNOSIS — Z12.5 PROSTATE CANCER SCREENING: Primary | ICD-10-CM

## 2024-11-11 NOTE — TELEPHONE ENCOUNTER
I was able to call and speak with patient wife explained to her the patient's most recent PSA returned low and stable at a value of 1.089.  Patient can plan to repeat his PSA in 1 year from his last. We placed a new PSA order in his chart so this could be obtained in 1 year. Patient wife understood everything.               ----- Message from Molina Anthony PA-C sent at 11/11/2024 12:36 PM EST -----  I reviewed the patient's most recent PSA and and returned low and stable at a value of 1.089.  Patient can plan to repeat his PSA in 1 year from his last.  I placed a new PSA order in his chart so this could be obtained in 1 year.

## 2024-11-19 DIAGNOSIS — I10 HYPERTENSION, UNSPECIFIED TYPE: ICD-10-CM

## 2024-11-20 RX ORDER — LISINOPRIL 5 MG/1
5 TABLET ORAL DAILY
Qty: 30 TABLET | Refills: 0 | Status: SHIPPED | OUTPATIENT
Start: 2024-11-20

## 2024-12-02 DIAGNOSIS — Z95.2 S/P AVR (AORTIC VALVE REPLACEMENT): ICD-10-CM

## 2024-12-02 DIAGNOSIS — Q23.81 BICUSPID AORTIC VALVE: ICD-10-CM

## 2024-12-02 DIAGNOSIS — Z95.1 S/P CABG X 1: ICD-10-CM

## 2024-12-03 RX ORDER — ATORVASTATIN CALCIUM 80 MG/1
TABLET, FILM COATED ORAL
Qty: 90 TABLET | Refills: 1 | Status: SHIPPED | OUTPATIENT
Start: 2024-12-03 | End: 2024-12-06 | Stop reason: SDUPTHER

## 2024-12-05 NOTE — PROGRESS NOTES
"Outpatient Cardiology Consult Note - Jose Damon 74 y.o. male MRN: [id number]    @ Encounter: [id number]    Assessment:  74 y.o. male of [place] heritage Hx per chart I initially met him in 2022 when he p/w second opinion for timing of intervention for Ao aneurysm, BAV, severe AS, moderate AI; was followed at [facility] in past, now s/p 10/2022 bAVR+CABG as below.    Bicuspid Ao valve, severe AS, Moderate AI. S/p 10/4/2022 bioAVR and CABGx1 D[facility]-LAD, Dr Henry  Asc Ao aneurysm 4.6cm on CT, 4.3cm by echo in 2022. 10/2023 CTA chest: Unchanged ascending thoracic aortic dilatation measuring 4.2 x 4.6 cm.   Preserved LVEF  Recurrent post op AF inpt and then subsequently as outpt about 1-2 months after surgery, on AC  LVH, IVSd 1.8cm, intracavitary gradient on intraop YOBANY  OA  Pre DM  HTN  HLD  Etoh use abuse - has 4 etoh drinks daily > reduced to 1 daily 12/2024   at [facility]          Lab Units 11/08/24  0842 05/24/24  0411 05/04/24  0820   CREATININE mg/dL 0.83 0.80 0.80         Lab Results   Component Value Date    K 4.6 11/08/2024     Lab Results   Component Value Date    HGBA1C 6.2 (H) 11/08/2024     Lab Results   Component Value Date    GSA0EYFREPPT 2.277 11/08/2024    TSH 2.52 05/04/2024     Lab Results   Component Value Date    LDLCALC 63 11/08/2024     No results found for: \"BNP\"   No results found for: \"NTBNP\"        Invalid input(s): \"02HGBVB\"    TODAY's PLAN:     12/06/24  Warm, euvolemic  No new cardiac complaints, feels generally well  Rare mild dizziness after heavier exertion, mutliple flights of stairs > advised increased hydration  BP acceptable  Ecg today acceptable    Cardiac pre op evaluation:  - the patient is low-moderate risk for non-cardiac surgery  - the patient likely can complete 4 mets exertion  - no evidence of acute cardiac issue including decompensated HF, uncontrolled arrhythmia, severe symptomatic valvulopathy, or an ACS  - the patient is currently optimized from " cardiac perspective  - no further cardiac testing planned at this time  - continue current home medications with any babita-op modifications per anesthesia/surgery teams, in addition to my below recommendations  - continue bblocker uninterrupted  - AC plan: can hold eliquis 2 days pre op  - anti-platelet plan: can hold ASA 7 days pre op    CTA Aorta for aneurysm screening - next check around 10/2024 - ordered today    cw ASA  cw eliquis 5 bid  cw atorva 80 daily    cw toprol xl 100 qd    Cw lisinopril 5 qd    Advised tlc, applauded etoh reduction to 1 drink daily, cw exercise, diet    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  In addition to follow up with their other medical providers    Key info from my prior notes:    Advised stay well hydrated, long discussion today    Studies:    EKG:  Personally reviewed by me.  10/20/22: nsr, 1oavb, nl axis, LVH, non specific STT changes  8/18/22: nsr, nl axis, LVH, LAE, anterior Q waves, non specific STT changes  1/5/23: nsr, nl axis, non specific STT changes  4/14/23: nsr, nl axis, borderline LVH, non specific STT changes    10/4/22 intraop YOBANY:  Echocardiographic and Doppler Measurements     PREPROCEDURE     LEFT VENTRICLE:  Systolic Function: normal. Ejection Fraction: 60%.         RIGHT VENTRICLE:  Systolic Function: normal.  Cavity size normal.       AORTIC VALVE:  Leaflets: fusion oc LCC and RCC; severely calcified and bileaflet.   Mean Gradient: 60 mmHg. Peak Gradient: 90 mmHg. Maximum velocity (cm/s): 4.7 m/sec.  Regurgitation: moderate. Pressure Half-time: 474 ms.      MITRAL VALVE:  Leaflets: calcified and normal. Leaflet Motions: chordal CHRISTIANO. Regurgitation: mild.           TRICUSPID VALVE:  Leaflets: normal.   Regurgitation: mild.     ASCENDING AORTA:   Diameter (cm): 4.4-4.5 cm (YOBANY and epi-aortic) Dissection not present.       AORTIC ARCH:    dissection not present. Grade 3:  "atheroma protruding < 0.5 cm into lumen.     DESCENDING AORTA:    Dissection not present. Grade 3: atheroma protruding < 0.5 cm into lumen.           RIGHT ATRIUM:   No spontaneous echo contrast.     LEFT ATRIUM:   No spontaneous echo contrast.     LEFT ATRIAL APPENDAGE:   No spontaneous echo contrast Emptying Velocity: 41 cm/sec.     OTHER ATRIAL FINDINGS:  No JAY clot.     ATRIAL SEPTUM:  Intra-atrial septal morphology: no PFO by CFD.       EPIAORTIC:  Plaque Thickness: 0-5 mm.     POSTPROCEDURE     LEFT VENTRICLE: Unchanged .          RIGHT VENTRICLE:   Systolic Function: top normal.      AORTIC VALVE:   Leaflets: bioprosthetic valve in aortic position, well-seated, does not rock, good leaflet excursion and closure, no PVL and bioprosthetic.  Mean Gradient: not accurate, given presence iof CHRISTIANO - gradient across AV/LVOT 15 mmHg (\"dagger shape, sugesting LVOTO)        MITRAL VALVE:    Regurgitation: mild-mod; significant chordal CHRISTIANO remains; gradient across LVOT/AV 15 mmHg.       TRICUSPID VALVE: Unchanged .    10/2022 AV path:  Final Diagnosis   A. Heart Valve, aortic valve:  - Senile calcific degeneration of three cardiac valvular leaflets, consistent with senile calcific aortic stenosis.  - Van Giesson elastin stains & trichrome stains demonstrate preservation of the usual layered valvular architecture with bulky calcifications centered in the fibrosa layer without neovascularization, acute inflammation or destructive fibrinous changes.     [facility] 9/30/22:     Mid LAD lesion is 80% stenosed.     Single vessel disease, with an 80% stenosis of the LAD immediately distal to the D1 bifurcation.  Plan: surgical AVR, possible aneurysm repair.    EST 8/29/22:    Resting ECG: There is left ventricular hypertrophy without strain. There is a myocardial infarction located in the anteroseptal region.    Blood pressure demonstrated a hypotensive response and heart rate demonstrated a normal response to stress.    Stress ECG: " Horizontal ST depression of 1.0 mm in the inferior leads is noted. The ECG was positive for ischemia. The stress ECG is consistent with ischemia after maximal exercise, without reproduction of symptoms.    8/25/22 TTE:    Left Ventricle: Left ventricular cavity size is normal. Wall thickness is moderate to severely increased. There is moderate concentric hypertrophy. There is severe asymmetric hypertrophy of the septal wall. The left ventricular ejection fraction is 70%. Systolic function is vigorous. Wall motion is normal. Diastolic function is mildly abnormal, consistent with grade I (abnormal) relaxation.    Left Atrium: The atrium is mildly dilated.    Aortic Valve: The aortic valve is functionally bicuspid with commissural fusion of the right-left coronary cusp. The leaflets are severely thickened. The leaflets are moderately calcified. There is severely reduced mobility. There is moderate regurgitation. There is severe stenosis. The aortic valve peak velocity is 4.34 m/s. The aortic valve mean gradient is 49 mmHg. The dimensionless velocity index is 0.30. The aortic valve area is 0.85 cm2.    Mitral Valve: There is mild to moderate annular calcification. There is mild regurgitation.    Tricuspid Valve: There is mild regurgitation.    Pulmonic Valve: There is mild regurgitation.    Aorta: The aortic root is mildly dilated. The ascending aorta is mildly dilated. The aortic root is 4.10 cm. The ascending aorta is 4.2 cm.  IVSd 1.8cm    9/23/22 CT chest:  IMPRESSION:     Unchanged ascending thoracic aortic aneurysm measuring 4.6 cm.     Previous lung nodules are smaller, less prominent and groundglass compared to solid appearance.    LVHN data:  TTE (1/2022):   Left Ventricle: There is severe concentric hypertrophy. Systolic function is normal with an ejection fraction of 60-65%. There is grade I (mild) diastolic dysfunction and normal left atrial pressure.   Left Atrium: Left atrium cavity is moderately  dilated.   Aortic Valve: There is at least moderate regurgitation. There is severe stenosis.   Ascending Aorta: The aortic root is mildly dilated. The ascending aorta is mildly dilated.   Tricuspid Valve: There is mild regurgitation.    CT chest (9/21):  Stable pulmonary nodularity at 3 years follow-up.     Heavy aortic valve calcification with fusiform ascending aortic aneurysm  Unchanged    Direct review of study demonstrates unchanged appearance of aorta measuring approximately 4.5 cm in maximum diameter.    CT chest 9/2021  Heavy aortic valve calcification with fusiform ascending aortic aneurysm  Unchanged 4.5 cm     HPI:   72 y.o. male Hx per chart p/w second opinion for timing of intervention for Ao aneurysm, BAV, severe AS, moderate AI; follows at [facility]. EF preserved and LV not dilated per OSH TTE. 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 Syncope in heat ovr 20 years ago, none since. Feels good energy level. Reports no progression of cardiac sx or fatigue in recent months/years.  No new CP/SOB/dizziness/palpitations/syncope.  No new leg swelling, PND, pillow orthopnea, unintentional weight changes.    11/2021 OSH cards note:  Stable at 4.5-4.6 cm for the past 2-3 years. Follow up with CTS. Aware of ~5 cm threshold for surgery given his BAV. He will avoid heavy lifting and exertion. BP is controlled    Home meds:   aspirin 81 MG EC tablet Take 81 mg by mouth daily.    cartilage/collagen/bor/hyalur (JOINT HEALTH ORAL) Take 120 capsules by mouth.    lisinopriL (PRINIVIL,ZESTRIL) 20 MG tablet TAKE 1 TABLET BY MOUTH DAILY 90 tablet 3    magnesium oxide 200 mg magnesium chew Take by mouth. Magnesium w/ b6    multivitamin (THERAGRAN) tab Take 1 tablet by mouth daily.    SAW PALMETTO FRUIT  ORAL Take 1 tablet by mouth daily.    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 He drinks 2-3 glasses of red wine nightly. He is originally from [place].     No current tobacco use, alcohol abuse, illicit drug use.    Interval Hx:  As noted in 'plan' section above and prior epic chart notes.    No new CP/SOB/palpitations/syncope.  No new fatigue.  No new unintentional weight changes.  No new leg swelling, PND, pillow orthopnea.  No new fevers, chills, cough, nausea, vomiting, diarrhea, dysuria.    4/14/23:  Warm, euvolemic  Feels very well  ECG - NSR today  Lipids better in care everywhere    BP remains above target - restart prior lisinopril but at lower dose of 5mg qd  Fu BMP in 1 week  No new CP/SOB/dizziness/palpitations/syncope.  No new leg swelling, PND, pillow orthopnea, unintentional weight changes, fatigue.  No new fevers, chills, cough, nausea, vomiting, diarrhea, dysuria.  No bleeding    Last visit:  Lengthy discussion with pt 9/1/22, regarding calcified BAV, Asc Ao aneurysm 4.6cm by outside CT scan (stable size), recent EST with hypotensive exercise response, dizziness, ischemic ECG.    S/p bioAVR and CABGx1 10/4/22, doing well since  Warm/dry, euvolemic today; RRR on exam  Ambulating further, using stairs, doing all ADLs, mild SABILLON ongoing but improving    No new CP/dizziness/palpitations/syncope.  No new leg swelling, PND, pillow orthopnea, unintentional weight changes, fatigue.  No new fevers, chills, cough, nausea, vomiting, diarrhea, dysuri      Past Medical History:   Diagnosis Date    Aortic stenosis     Ascending aortic aneurysm (HCC)     Hyperlipidemia     Hypertension     Nonrheumatic aortic valve insufficiency     Osteoarthritis     Prostatitis      Patient Active Problem List   Diagnosis    Nonrheumatic aortic valve stenosis    Ascending aortic aneurysm     Nonrheumatic aortic valve insufficiency    Pre-diabetes    Hyperlipidemia    Bicuspid aortic valve    S/P CABG x 1    Coronary artery disease    S/P AVR (aortic valve replacement)    Anticoagulated on Coumadin    Postoperative atrial fibrillation (HCC)    Hyponatremia    Postoperative anemia due to acute blood loss    Thrombocytopenia (HCC)    Leukocytosis       ROS:  10 point ROS negative except as specified in HPI    Allergies   Allergen Reactions    Pollen Extract Nasal Congestion       Current Outpatient Medications   Medication Instructions    amoxicillin (AMOXIL) 500 MG tablet TAKE 4 TABLETS BY MOUTH 1 HOUR BEFORE PROCEDURE    ascorbic acid (VITAMIN C) 500 mg, Oral, Daily    aspirin (ECOTRIN LOW STRENGTH) 81 mg, Daily    atorvastatin (LIPITOR) 80 mg tablet TAKE 1 TABLET(80 MG) BY MOUTH DAILY WITH DINNER    Eliquis 5 mg, Oral, 2 times daily    Eliquis 5 mg, Oral, 2 times daily    Eliquis 5 mg, Oral, 2 times daily    ferrous sulfate 325 mg, Oral, Daily with breakfast    lisinopril (ZESTRIL) 5 mg, Oral, Daily    magnesium 30 mg    metoprolol succinate (TOPROL-XL) 100 mg, Oral, Daily    Multiple Vitamin (multivitamin) tablet 1 tablet, Daily    mupirocin (BACTROBAN) 2 % ointment APPLY EVERY DAY TO TWICE DAILY TO SURGICAL WOUND FOR 1-2 WEEKS UNTIL COMPLETELY HEALED    Omega-3 2 g, Daily    omeprazole (PRILOSEC) 20 mg, Oral, Daily        Social History     Socioeconomic History    Marital status: /Civil Union     Spouse name: Not on file    Number of children: Not on file    Years of education: Not on file    Highest education level: Not on file   Occupational History    Not on file   Tobacco Use    Smoking status: Never    Smokeless tobacco: Never   Vaping Use    Vaping status: Never Used   Substance and Sexual Activity    Alcohol use: Not Currently     Alcohol/week: 28.0 standard drinks of alcohol     Types: 14 Glasses of wine, 14 Shots of liquor per week     Comment: 2 small glasses of wine & 2 shots  "preston daily    Drug use: Not Currently    Sexual activity: Yes     Partners: Female   Other Topics Concern    Not on file   Social History Narrative    Not on file     Social Drivers of Health     Financial Resource Strain: Low Risk  (5/23/2024)    Received from [facility]    Overall Financial Resource Strain (CARDIA)     Difficulty of Paying Living Expenses: Not hard at all   Food Insecurity: No Food Insecurity (5/23/2024)    Received from [facility]    Hunger Vital Sign     Worried About Running Out of Food in the Last Year: Never true     Ran Out of Food in the Last Year: Never true   Transportation Needs: No Transportation Needs (5/23/2024)    Received from [facility]    PRAPARE - Transportation     Lack of Transportation (Medical): No     Lack of Transportation (Non-Medical): No   Physical Activity: Not on file   Stress: Not on file   Social Connections: Not on file   Intimate Partner Violence: Not At Risk (5/23/2024)    Received from [facility]    Humiliation, Afraid, Rape, and Kick questionnaire     Fear of Current or Ex-Partner: No     Emotionally Abused: No     Physically Abused: No     Sexually Abused: No   Housing Stability: Low Risk  (10/6/2022)    Housing Stability Vital Sign     Unable to Pay for Housing in the Last Year: No     Number of Places Lived in the Last Year: 1     Unstable Housing in the Last Year: No       No family history on file.    Physical Exam:  Vitals:    12/06/24 1038   BP: 130/82   BP Location: Left arm   Patient Position: Sitting   Cuff Size: Standard   Pulse: 83   SpO2: 97%   Weight: 91.7 kg (202 lb 3.2 oz)   Height: 5' 11\" (1.803 m)       Constitutional: NAD, non toxic  Ears/nose/mouth/throat: atraumatic  CV: RRR, 1/6 systolic murmur, no JVD, no HJR  Resp: ctabl  GI: Soft, NTND  MSK: no swollen joints in exposed areas  Extr: No edema, WWP  Pysche: Normal affect  Neuro: appropriate in conversation  Skin: " "dry and intact in exposed areas    Labs & Results:    Lab Results   Component Value Date    WBC 6.49 11/08/2024    HGB 15.2 11/08/2024    HCT 45.9 11/08/2024    MCV 94 11/08/2024     11/08/2024     Lab Results   Component Value Date    SODIUM 137 11/08/2024    K 4.6 11/08/2024     11/08/2024    CO2 27 11/08/2024    BUN 14 11/08/2024    CREATININE 0.83 11/08/2024    GLUC 124 (H) 05/24/2024    CALCIUM 8.9 11/08/2024     No results found for: \"BNP\"   Lab Results   Component Value Date    CHOLESTEROL 133 11/08/2024    CHOLESTEROL 238 (H) 09/23/2022     Lab Results   Component Value Date    HDL 57 11/08/2024    HDL 75 09/23/2022     Lab Results   Component Value Date    TRIG 67 11/08/2024    TRIG 76 09/23/2022     Lab Results   Component Value Date    NONHDLC 76 11/08/2024    NONHDLC 163 09/23/2022       Counseling / Coordination of Care  Greater than 50% of total time was spent with the patient and / or family counseling and / or coordination of care. We discussed diagnoses, most recent studies and any changes in treatment.    Thank you for the opportunity to participate in the care of this patient.    Long Marmolejo MD  Attending Physician  Advanced Heart Failure and Transplant Cardiology  [facility]  "

## 2024-12-06 ENCOUNTER — OFFICE VISIT (OUTPATIENT)
Dept: CARDIOLOGY CLINIC | Facility: CLINIC | Age: 74
End: 2024-12-06
Payer: COMMERCIAL

## 2024-12-06 VITALS
WEIGHT: 202.2 LBS | DIASTOLIC BLOOD PRESSURE: 82 MMHG | HEART RATE: 83 BPM | OXYGEN SATURATION: 97 % | HEIGHT: 71 IN | SYSTOLIC BLOOD PRESSURE: 130 MMHG | BODY MASS INDEX: 28.31 KG/M2

## 2024-12-06 DIAGNOSIS — Q23.81 BICUSPID AORTIC VALVE: ICD-10-CM

## 2024-12-06 DIAGNOSIS — Z95.2 S/P AVR (AORTIC VALVE REPLACEMENT): Primary | ICD-10-CM

## 2024-12-06 DIAGNOSIS — Z01.818 PRE-OP EVALUATION: ICD-10-CM

## 2024-12-06 DIAGNOSIS — I71.21 ANEURYSM OF ASCENDING AORTA WITHOUT RUPTURE (HCC): ICD-10-CM

## 2024-12-06 DIAGNOSIS — I10 HYPERTENSION, UNSPECIFIED TYPE: ICD-10-CM

## 2024-12-06 DIAGNOSIS — I48.91 ATRIAL FIBRILLATION, UNSPECIFIED TYPE (HCC): ICD-10-CM

## 2024-12-06 DIAGNOSIS — Z95.1 S/P CABG X 1: ICD-10-CM

## 2024-12-06 PROCEDURE — 99214 OFFICE O/P EST MOD 30 MIN: CPT | Performed by: STUDENT IN AN ORGANIZED HEALTH CARE EDUCATION/TRAINING PROGRAM

## 2024-12-06 RX ORDER — LISINOPRIL 5 MG/1
5 TABLET ORAL DAILY
Qty: 90 TABLET | Refills: 5 | Status: SHIPPED | OUTPATIENT
Start: 2024-12-06 | End: 2024-12-12 | Stop reason: SDUPTHER

## 2024-12-06 RX ORDER — METOPROLOL SUCCINATE 100 MG/1
100 TABLET, EXTENDED RELEASE ORAL DAILY
Qty: 90 TABLET | Refills: 5 | Status: SHIPPED | OUTPATIENT
Start: 2024-12-06 | End: 2026-05-30

## 2024-12-06 RX ORDER — ASPIRIN 81 MG/1
81 TABLET ORAL DAILY
Qty: 90 TABLET | Refills: 5 | Status: SHIPPED | OUTPATIENT
Start: 2024-12-06 | End: 2026-05-30

## 2024-12-06 RX ORDER — ATORVASTATIN CALCIUM 80 MG/1
80 TABLET, FILM COATED ORAL DAILY
Qty: 90 TABLET | Refills: 5 | Status: SHIPPED | OUTPATIENT
Start: 2024-12-06 | End: 2026-05-30

## 2024-12-12 DIAGNOSIS — I10 HYPERTENSION, UNSPECIFIED TYPE: ICD-10-CM

## 2024-12-12 DIAGNOSIS — I48.91 ATRIAL FIBRILLATION, UNSPECIFIED TYPE (HCC): ICD-10-CM

## 2024-12-12 RX ORDER — LISINOPRIL 5 MG/1
5 TABLET ORAL DAILY
Qty: 30 TABLET | Refills: 0 | Status: SHIPPED | OUTPATIENT
Start: 2024-12-12 | End: 2026-06-05

## 2024-12-12 NOTE — TELEPHONE ENCOUNTER
NOT A DUPLICATE pt needs short term supply sent to local pharmacy till mail order comes    Reason for call:   [x] Refill   [] Prior Auth  [] Other:     Office:   [] PCP/Provider -   [x] Specialty/Provider - Cardio     Medication: lisinopril (ZESTRIL) 5 mg tablet Take 1 tablet (5 mg total) by mouth daily     apixaban (Eliquis) 5 mg Take 1 tablet (5 mg total) by mouth 2 (two) times a day       Pharmacy: [id number]  [place], [place] - [address]       Does the patient have enough for 3 days?   [] Yes   [x] No - Send as HP to POD

## 2024-12-13 ENCOUNTER — HOSPITAL ENCOUNTER (OUTPATIENT)
Dept: RADIOLOGY | Facility: HOSPITAL | Age: 74
Discharge: HOME | End: 2024-12-13
Attending: STUDENT IN AN ORGANIZED HEALTH CARE EDUCATION/TRAINING PROGRAM
Payer: COMMERCIAL

## 2024-12-13 DIAGNOSIS — I71.21 ANEURYSM OF ASCENDING AORTA WITHOUT RUPTURE (HCC): ICD-10-CM

## 2024-12-13 PROCEDURE — 71275 CT ANGIOGRAPHY CHEST: CPT

## 2024-12-13 RX ADMIN — IOHEXOL 90 ML: 350 INJECTION, SOLUTION INTRAVENOUS at 14:07

## 2024-12-17 ENCOUNTER — RESULTS FOLLOW-UP (OUTPATIENT)
Dept: CARDIOLOGY CLINIC | Facility: CLINIC | Age: 74
End: 2024-12-17

## 2024-12-17 NOTE — RESULT ENCOUNTER NOTE
Please notify patient that testing shows unchanged aortic size. No expansion of the aortic aneurysm.    Reassuring results.      Thanks    - Long

## 2024-12-20 NOTE — TELEPHONE ENCOUNTER
Called patient and gave results.       ----- Message from Long Marmolejo MD sent at 12/17/2024  9:57 AM EST -----  Please notify patient that testing shows unchanged aortic size. No expansion of the aortic aneurysm.    Reassuring results.      Thanks    - Long

## 2025-01-07 ENCOUNTER — TELEPHONE (OUTPATIENT)
Age: 75
End: 2025-01-07

## 2025-01-07 NOTE — TELEPHONE ENCOUNTER
Caller: Patient      Reason For Call: Patient is scheduled  01/13/25 Right hip artioplasty sx (left hip done last may),  Patient needs clearance letter and recent EKG /med records [facility]. Patient is providing 's office number so that we may coordinate. He said to ask for Shira as she is familiar with his case. Please advise. Thanks!       Phone: [phone]

## 2025-01-08 DIAGNOSIS — I48.91 ATRIAL FIBRILLATION, UNSPECIFIED TYPE (HCC): ICD-10-CM

## 2025-01-08 DIAGNOSIS — I10 HYPERTENSION, UNSPECIFIED TYPE: ICD-10-CM

## 2025-01-08 RX ORDER — LISINOPRIL 5 MG/1
5 TABLET ORAL DAILY
Qty: 30 TABLET | Refills: 0 | Status: SHIPPED | OUTPATIENT
Start: 2025-01-08 | End: 2026-07-02

## 2025-01-09 DIAGNOSIS — Z95.2 S/P AVR (AORTIC VALVE REPLACEMENT): Primary | ICD-10-CM

## 2025-01-09 NOTE — TELEPHONE ENCOUNTER
Patient Jose [phone] established patient of Dr. Marmolejo, contacting office requesting a STAT order to be put into [facility] so he is able to have an echocardiogram either today or tomorrow prior to his surgery scheduled for this Monday.    Once order is in, please contact patient to advise.    Patient was provided the telephone number for Central Scheduling

## 2025-01-09 NOTE — TELEPHONE ENCOUNTER
[facility] Calling regarding patient's upcoming surgery 1/23/25. They state patient was supposed to ask us to place an order for an echo, but he still has not had it done. They are asking if there is any way we can place an order for an echo to be done ASAP before his surgery Monday.

## 2025-01-09 NOTE — TELEPHONE ENCOUNTER
Ok to order Echo ? Anesthesia wants a stat echo because he had AVR.  I called ortho and asked if the CTA from 12/13/24 would be enough.    Please advise

## 2025-01-10 ENCOUNTER — HOSPITAL ENCOUNTER (OUTPATIENT)
Dept: NON INVASIVE DIAGNOSTICS | Facility: HOSPITAL | Age: 75
Discharge: HOME | End: 2025-01-10
Attending: STUDENT IN AN ORGANIZED HEALTH CARE EDUCATION/TRAINING PROGRAM
Payer: COMMERCIAL

## 2025-01-10 ENCOUNTER — RESULTS FOLLOW-UP (OUTPATIENT)
Dept: CARDIOLOGY CLINIC | Facility: CLINIC | Age: 75
End: 2025-01-10

## 2025-01-10 VITALS
BODY MASS INDEX: 28.28 KG/M2 | SYSTOLIC BLOOD PRESSURE: 130 MMHG | HEIGHT: 71 IN | WEIGHT: 202 LBS | DIASTOLIC BLOOD PRESSURE: 82 MMHG | HEART RATE: 82 BPM

## 2025-01-10 DIAGNOSIS — Z95.2 S/P AVR (AORTIC VALVE REPLACEMENT): ICD-10-CM

## 2025-01-10 LAB
AORTIC ROOT: 3.6 CM
AORTIC VALVE MEAN VELOCITY: 13.1 M/S
APICAL FOUR CHAMBER EJECTION FRACTION: 65 %
AV AREA BY CONTINUOUS VTI: 2.7 CM2
AV AREA PEAK VELOCITY: 2.3 CM2
AV LVOT MEAN GRADIENT: 5 MMHG
AV LVOT PEAK GRADIENT: 8 MMHG
AV MEAN GRADIENT: 8 MMHG
AV PEAK GRADIENT: 14 MMHG
AV VALVE AREA: 2.67 CM2
AV VELOCITY RATIO: 0.73
BSA FOR ECHO PROCEDURE: 2.12 M2
DOP CALC AO PEAK VEL: 1.9 M/S
DOP CALC AO VTI: 37.01 CM
DOP CALC LVOT AREA: 3.14 CM2
DOP CALC LVOT CARDIAC INDEX: 3.6 L/MIN/M2
DOP CALC LVOT CARDIAC OUTPUT: 7.62 L/MIN
DOP CALC LVOT DIAMETER: 2 CM
DOP CALC LVOT PEAK VEL VTI: 31.45 CM
DOP CALC LVOT PEAK VEL: 1.39 M/S
DOP CALC LVOT STROKE INDEX: 46.7 ML/M2
DOP CALC LVOT STROKE VOLUME: 98.75
DOP CALC MV VTI: 33.53 CM
E WAVE DECELERATION TIME: 407 MS
E/A RATIO: 0.89
FRACTIONAL SHORTENING: 31 (ref 28–44)
INTERVENTRICULAR SEPTUM IN DIASTOLE (PARASTERNAL SHORT AXIS VIEW): 2 CM
INTERVENTRICULAR SEPTUM: 2 CM (ref 0.6–1.1)
LAAS-AP2: 27.2 CM2
LAAS-AP4: 25.1 CM2
LEFT ATRIUM SIZE: 3.6 CM
LEFT ATRIUM VOLUME (MOD BIPLANE): 95 ML
LEFT ATRIUM VOLUME INDEX (MOD BIPLANE): 44.8 ML/M2
LEFT INTERNAL DIMENSION IN SYSTOLE: 2 CM (ref 2.1–4)
LEFT VENTRICULAR INTERNAL DIMENSION IN DIASTOLE: 2.9 CM (ref 3.5–6)
LEFT VENTRICULAR POSTERIOR WALL IN END DIASTOLE: 1.9 CM
LEFT VENTRICULAR STROKE VOLUME: 18 ML
LVSV (TEICH): 18 ML
MV E'TISSUE VEL-SEP: 6 CM/S
MV MEAN GRADIENT: 2 MMHG
MV PEAK A VEL: 1.04 M/S
MV PEAK E VEL: 93 CM/S
MV PEAK GRADIENT: 4 MMHG
MV STENOSIS PRESSURE HALF TIME: 118 MS
MV VALVE AREA BY CONTINUITY EQUATION: 2.95 CM2
MV VALVE AREA P 1/2 METHOD: 1.86
RIGHT ATRIUM AREA SYSTOLE A4C: 19.2 CM2
RIGHT VENTRICLE ID DIMENSION: 3.8 CM
SL CV LEFT ATRIUM LENGTH A2C: 5.7 CM
SL CV LV EF: 60
SL CV PED ECHO LEFT VENTRICLE DIASTOLIC VOLUME (MOD BIPLANE) 2D: 31 ML
SL CV PED ECHO LEFT VENTRICLE SYSTOLIC VOLUME (MOD BIPLANE) 2D: 13 ML
TR MAX PG: 13 MMHG
TR PEAK VELOCITY: 1.8 M/S
TRICUSPID ANNULAR PLANE SYSTOLIC EXCURSION: 1.5 CM
TRICUSPID VALVE PEAK REGURGITATION VELOCITY: 1.83 M/S

## 2025-01-10 PROCEDURE — 93306 TTE W/DOPPLER COMPLETE: CPT

## 2025-01-10 PROCEDURE — 93306 TTE W/DOPPLER COMPLETE: CPT | Performed by: INTERNAL MEDICINE

## 2025-01-10 NOTE — TELEPHONE ENCOUNTER
Called patient and gave wife results.       ----- Message from Long Marmolejo MD sent at 1/10/2025  1:58 PM EST -----  Please notify patient that his echo is acceptable for now with good valve parameters. It is important he does not get dehydrated, as we have discussed in the past.      Thanks    - Long

## 2025-01-10 NOTE — RESULT ENCOUNTER NOTE
Please notify patient that his echo is acceptable for now with good valve parameters. It is important he does not get dehydrated, as we have discussed in the past.      Thanks    - Long

## 2025-03-06 DIAGNOSIS — I48.91 ATRIAL FIBRILLATION, UNSPECIFIED TYPE (HCC): ICD-10-CM

## 2025-03-06 DIAGNOSIS — I10 HYPERTENSION, UNSPECIFIED TYPE: ICD-10-CM

## 2025-03-06 RX ORDER — LISINOPRIL 5 MG/1
5 TABLET ORAL DAILY
Qty: 30 TABLET | Refills: 5 | Status: SHIPPED | OUTPATIENT
Start: 2025-03-06

## 2025-03-06 RX ORDER — APIXABAN 5 MG/1
5 TABLET, FILM COATED ORAL 2 TIMES DAILY
Qty: 60 TABLET | Refills: 5 | Status: SHIPPED | OUTPATIENT
Start: 2025-03-06

## 2025-05-08 DIAGNOSIS — I10 HYPERTENSION, UNSPECIFIED TYPE: ICD-10-CM

## 2025-05-08 DIAGNOSIS — I48.91 ATRIAL FIBRILLATION, UNSPECIFIED TYPE (HCC): ICD-10-CM

## 2025-05-08 RX ORDER — LISINOPRIL 5 MG/1
5 TABLET ORAL DAILY
Qty: 90 TABLET | Refills: 1 | Status: SHIPPED | OUTPATIENT
Start: 2025-05-08

## 2025-05-08 RX ORDER — LISINOPRIL 5 MG/1
5 TABLET ORAL DAILY
Qty: 21 TABLET | Refills: 0 | Status: SHIPPED | OUTPATIENT
Start: 2025-05-08

## 2025-05-08 NOTE — TELEPHONE ENCOUNTER
Reason for call: Not a duplicate - sent to [facility] as 30D supply patient need 90D to [facility].    Multiple Pharmacies - Mail Order and Short supply (3W supplies) patient going away on Tuesday.    [x] Refill   [] Prior Auth  [] Other:     Office:   [] PCP/Provider -   [x] Specialty/Provider -     Medication:   Lisinopril  5 mg daily  Eliquis   5 BID    Dose/Frequency: see above    Quantity:  90D prescription to [facility]   3W Rx to [facility]    Pharmacy: [facility]    Local Pharmacy   Does the patient have enough for 3 days?   [] Yes   [x] No - Send as HP to POD    Mail Away Pharmacy   Does the patient have enough for 10 days?   [] Yes   [x] No - Send as HP to POD

## 2025-05-26 DIAGNOSIS — I48.91 ATRIAL FIBRILLATION, UNSPECIFIED TYPE (HCC): ICD-10-CM

## 2025-05-27 RX ORDER — APIXABAN 5 MG/1
5 TABLET, FILM COATED ORAL 2 TIMES DAILY
Qty: 60 TABLET | Refills: 5 | Status: SHIPPED | OUTPATIENT
Start: 2025-05-27

## 2025-07-11 DIAGNOSIS — Z95.1 S/P CABG X 1: ICD-10-CM

## 2025-07-11 DIAGNOSIS — Q23.81 BICUSPID AORTIC VALVE: ICD-10-CM

## 2025-07-11 DIAGNOSIS — Z95.2 S/P AVR (AORTIC VALVE REPLACEMENT): ICD-10-CM

## 2025-07-11 RX ORDER — ATORVASTATIN CALCIUM 80 MG/1
80 TABLET, FILM COATED ORAL DAILY
Qty: 90 TABLET | Refills: 1 | Status: SHIPPED | OUTPATIENT
Start: 2025-07-11 | End: 2027-01-02

## 2025-07-11 NOTE — TELEPHONE ENCOUNTER
Medication: Atorvastatin 80 mg     Dose/Frequency: 1 tablet daily     Quantity: 90    Pharmacy: [facility], [place]     Office:   [] PCP/Provider -   [x] Speciality/Provider -     Does the patient have enough for 3 days?   [] Yes   [x] No - Send as HP to POD

## 2025-07-17 NOTE — PROGRESS NOTES
"Outpatient Cardiology Consult Note - Jose Damon 75 y.o. male MRN: [id number]    @ Encounter: [id number]    Assessment:  75 y.o. male of [place] heritage Hx per chart I initially met him in 2022 when he p/w second opinion for timing of intervention for Ao aneurysm, BAV, severe AS, moderate AI; was followed at [facility] in past, now s/p 10/2022 bAVR+CABG as below.    Bicuspid Ao valve, severe AS, Moderate AI. S/p 10/4/2022 bioAVR and CABGx1 [facility], Dr Henry  Asc Ao aneurysm 4.6cm on CT, 4.3cm by echo in 2022. 10/2023 CTA chest: Unchanged ascending thoracic aortic dilatation measuring 4.2 x 4.6 cm > 12/2024 CT chest: Unchanged ectatic ascending thoracic aorta measuring 4.4 cm when measured at the same level and technique.  Preserved LVEF  Recurrent post op AF inpt and then subsequently as outpt about 1-2 months after surgery, on AC  LVH, IVSd 1.8cm, intracavitary gradient on intraop YOBANY  OA  Pre DM  HTN  HLD  Etoh use abuse - has 4 etoh drinks daily > reduced to 1 daily 12/2024   at [facility]          Lab Units 01/14/25  0611 01/10/25  1021 11/08/24  0842   CREATININE mg/dL 0.71 0.77 0.83         Lab Results   Component Value Date    K 3.9 01/14/2025     Lab Results   Component Value Date    HGBA1C 6.2 (H) 01/10/2025     Lab Results   Component Value Date    ENQ6PSLFOFSH 2.277 11/08/2024    TSH 2.57 01/10/2025     Lab Results   Component Value Date    LDLCALC 63 11/08/2024     No results found for: \"BNP\"   No results found for: \"NTBNP\"        Invalid input(s): \"02HGBVB\"    TODAY's PLAN:     07/18/25  Warm, euvolemic  No new cardiac complaints, feels generally well  NYHA I  Very active, goes to gym every other day, extensive workouts, no overt SX.  Rare dizziness with rapid position change only  BP top- nl, he ha snot checked home BP as asked in past > he says he will check a few readings now and call us w any abnl    He is interested in AC cessation given time since his last documented " Afepisode  Discussed AF diagnosis, disease trajectory, and different pathways care may take depending on response to therapy. Risks vs benefits of AC cessation including some non quantifiable and non-mitigateble risk of CVA.  Remote nosebleed which was significant and he went to ED. Some increase in bruising, mild. NO other bleed Hx.  He is not interested in LINQ implant.  As of now we will pursue zio patch and if no AF may DC AC. May repeat another zio for surveillance downstream, potentially 10/2025.  All questions answered.    No Rx changes today    CTA Aorta for aneurysm screening - next annual check around 12/2025 - ordered today    cw ASA  cw eliquis 5 bid  cw atorva 80 daily    cw toprol xl 100 qd    Cw lisinopril 5 qd    Advised tlc, weight loss, again applauded his past etoh reduction though unclear exact consumption amount now, cw exercise, diet    Follow up:  With me in 12 months or sooner if symptoms evolve.  In addition to follow up with their other medical providers    Key info from my prior notes:    Advised stay well hydrated, long discussion today    Studies:    EKG:  Personally reviewed by me.  10/20/22: nsr, 1oavb, nl axis, LVH, non specific STT changes  8/18/22: nsr, nl axis, LVH, LAE, anterior Q waves, non specific STT changes  1/5/23: nsr, nl axis, non specific STT changes  4/14/23: nsr, nl axis, borderline LVH, non specific STT changes    10/4/22 intraop YOBANY:  Echocardiographic and Doppler Measurements     PREPROCEDURE     LEFT VENTRICLE:  Systolic Function: normal. Ejection Fraction: 60%.         RIGHT VENTRICLE:  Systolic Function: normal.  Cavity size normal.       AORTIC VALVE:  Leaflets: fusion oc LCC and RCC; severely calcified and bileaflet.   Mean Gradient: 60 mmHg. Peak Gradient: 90 mmHg. Maximum velocity (cm/s): 4.7 m/sec.  Regurgitation: moderate. Pressure Half-time: 474 ms.      MITRAL VALVE:  Leaflets: calcified and normal. Leaflet Motions: chordal CHRISTIANO. Regurgitation: mild.       "     TRICUSPID VALVE:  Leaflets: normal.   Regurgitation: mild.     ASCENDING AORTA:   Diameter (cm): 4.4-4.5 cm (YOBANY and epi-aortic) Dissection not present.       AORTIC ARCH:    dissection not present. Grade 3: atheroma protruding < 0.5 cm into lumen.     DESCENDING AORTA:    Dissection not present. Grade 3: atheroma protruding < 0.5 cm into lumen.           RIGHT ATRIUM:   No spontaneous echo contrast.     LEFT ATRIUM:   No spontaneous echo contrast.     LEFT ATRIAL APPENDAGE:   No spontaneous echo contrast Emptying Velocity: 41 cm/sec.     OTHER ATRIAL FINDINGS:  No JAY clot.     ATRIAL SEPTUM:  Intra-atrial septal morphology: no PFO by CFD.       EPIAORTIC:  Plaque Thickness: 0-5 mm.     POSTPROCEDURE     LEFT VENTRICLE: Unchanged .          RIGHT VENTRICLE:   Systolic Function: top normal.      AORTIC VALVE:   Leaflets: bioprosthetic valve in aortic position, well-seated, does not rock, good leaflet excursion and closure, no PVL and bioprosthetic.  Mean Gradient: not accurate, given presence iof CHRISTIANO - gradient across AV/LVOT 15 mmHg (\"dagger shape, sugesting LVOTO)        MITRAL VALVE:    Regurgitation: mild-mod; significant chordal CHRISTIANO remains; gradient across LVOT/AV 15 mmHg.       TRICUSPID VALVE: Unchanged .    10/2022 AV path:  Final Diagnosis   A. Heart Valve, aortic valve:  - Senile calcific degeneration of three cardiac valvular leaflets, consistent with senile calcific aortic stenosis.  - Van Giesson elastin stains & trichrome stains demonstrate preservation of the usual layered valvular architecture with bulky calcifications centered in the fibrosa layer without neovascularization, acute inflammation or destructive fibrinous changes.     [facility] 9/30/22:     Mid LAD lesion is 80% stenosed.     Single vessel disease, with an 80% stenosis of the LAD immediately distal to the D1 bifurcation.  Plan: surgical AVR, possible aneurysm repair.    EST 8/29/22:    Resting ECG: There is left ventricular hypertrophy " without strain. There is a myocardial infarction located in the anteroseptal region.    Blood pressure demonstrated a hypotensive response and heart rate demonstrated a normal response to stress.    Stress ECG: Horizontal ST depression of 1.0 mm in the inferior leads is noted. The ECG was positive for ischemia. The stress ECG is consistent with ischemia after maximal exercise, without reproduction of symptoms.    8/25/22 TTE:    Left Ventricle: Left ventricular cavity size is normal. Wall thickness is moderate to severely increased. There is moderate concentric hypertrophy. There is severe asymmetric hypertrophy of the septal wall. The left ventricular ejection fraction is 70%. Systolic function is vigorous. Wall motion is normal. Diastolic function is mildly abnormal, consistent with grade I (abnormal) relaxation.    Left Atrium: The atrium is mildly dilated.    Aortic Valve: The aortic valve is functionally bicuspid with commissural fusion of the right-left coronary cusp. The leaflets are severely thickened. The leaflets are moderately calcified. There is severely reduced mobility. There is moderate regurgitation. There is severe stenosis. The aortic valve peak velocity is 4.34 m/s. The aortic valve mean gradient is 49 mmHg. The dimensionless velocity index is 0.30. The aortic valve area is 0.85 cm2.    Mitral Valve: There is mild to moderate annular calcification. There is mild regurgitation.    Tricuspid Valve: There is mild regurgitation.    Pulmonic Valve: There is mild regurgitation.    Aorta: The aortic root is mildly dilated. The ascending aorta is mildly dilated. The aortic root is 4.10 cm. The ascending aorta is 4.2 cm.  IVSd 1.8cm    9/23/22 CT chest:  IMPRESSION:     Unchanged ascending thoracic aortic aneurysm measuring 4.6 cm.     Previous lung nodules are smaller, less prominent and groundglass compared to solid appearance.    LVHN data:  TTE (1/2022):   Left Ventricle: There is severe concentric  hypertrophy. Systolic function is normal with an ejection fraction of 60-65%. There is grade I (mild) diastolic dysfunction and normal left atrial pressure.   Left Atrium: Left atrium cavity is moderately dilated.   Aortic Valve: There is at least moderate regurgitation. There is severe stenosis.   Ascending Aorta: The aortic root is mildly dilated. The ascending aorta is mildly dilated.   Tricuspid Valve: There is mild regurgitation.    CT chest (9/21):  Stable pulmonary nodularity at 3 years follow-up.     Heavy aortic valve calcification with fusiform ascending aortic aneurysm  Unchanged    Direct review of study demonstrates unchanged appearance of aorta measuring approximately 4.5 cm in maximum diameter.    CT chest 9/2021  Heavy aortic valve calcification with fusiform ascending aortic aneurysm  Unchanged 4.5 cm     HPI:   72 y.o. male Hx per chart p/w second opinion for timing of intervention for Ao aneurysm, BAV, severe AS, moderate AI; follows at [facility]. EF preserved and LV not dilated per OSH TTE. 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 Syncope in heat ovr 20 years ago, none since. Feels good energy level. Reports no progression of cardiac sx or fatigue in recent months/years.  No new CP/SOB/dizziness/palpitations/syncope.  No new leg swelling, PND, pillow orthopnea, unintentional weight changes.    11/2021 OSH cards note:  Stable at 4.5-4.6 cm for the past 2-3 years. Follow up with CTS. Aware of ~5 cm threshold for surgery given his BAV. He will avoid heavy lifting and exertion. BP is controlled    Home meds:   aspirin 81 MG EC tablet Take 81 mg by mouth daily.    cartilage/collagen/bor/hyalur (JOINT HEALTH ORAL) Take 120 capsules by mouth.    lisinopriL (PRINIVIL,ZESTRIL) 20 MG  tablet TAKE 1 TABLET BY MOUTH DAILY 90 tablet 3    magnesium oxide 200 mg magnesium chew Take by mouth. Magnesium w/ b6    multivitamin (THERAGRAN) tab Take 1 tablet by mouth daily.    SAW PALMETTO FRUIT ORAL Take 1 tablet by mouth daily.    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 He drinks 2-3 glasses of red wine nightly. He is originally from [facility].     No current tobacco use, alcohol abuse, illicit drug use.    Interval Hx:  As noted in 'plan' section above and prior epic chart notes.    No new CP/SOB/palpitations/syncope.  No new fatigue.  No new unintentional weight changes.  No new leg swelling, PND, pillow orthopnea.  No new fevers, chills, cough, nausea, vomiting, diarrhea, dysuria.    4/14/23:  Warm, euvolemic  Feels very well  ECG - NSR today  Lipids better in care everywhere    BP remains above target - restart prior lisinopril but at lower dose of 5mg qd  Fu BMP in 1 week  No new CP/SOB/dizziness/palpitations/syncope.  No new leg swelling, PND, pillow orthopnea, unintentional weight changes, fatigue.  No new fevers, chills, cough, nausea, vomiting, diarrhea, dysuria.  No bleeding    Last visit:  Lengthy discussion with pt 9/1/22, regarding calcified BAV, Asc Ao aneurysm 4.6cm by outside CT scan (stable size), recent EST with hypotensive exercise response, dizziness, ischemic ECG.    S/p bioAVR and CABGx1 10/4/22, doing well since  Warm/dry, euvolemic today; RRR on exam  Ambulating further, using stairs, doing all ADLs, mild SABILLON ongoing but improving    No new CP/dizziness/palpitations/syncope.  No new leg swelling, PND, pillow orthopnea, unintentional weight changes, fatigue.  No new fevers, chills, cough, nausea, vomiting, diarrhea, dysuri      Past Medical History:   Diagnosis Date    Aortic stenosis     Ascending aortic aneurysm (HCC)     Hyperlipidemia      Hypertension     Nonrheumatic aortic valve insufficiency     Osteoarthritis     Prostatitis      Patient Active Problem List   Diagnosis    Nonrheumatic aortic valve stenosis    Ascending aortic aneurysm    Nonrheumatic aortic valve insufficiency    Pre-diabetes    Hyperlipidemia    Bicuspid aortic valve    S/P CABG x 1    Coronary artery disease    S/P AVR (aortic valve replacement)    Anticoagulated on Coumadin    Postoperative atrial fibrillation (HCC)    Hyponatremia    Postoperative anemia due to acute blood loss    Thrombocytopenia (HCC)    Leukocytosis       ROS:  10 point ROS negative except as specified in HPI    Allergies   Allergen Reactions    Pollen Extract Nasal Congestion       Current Outpatient Medications   Medication Instructions    amoxicillin (AMOXIL) 500 MG tablet     apixaban (ELIQUIS) 5 mg, Oral, 2 times daily    ascorbic acid (VITAMIN C) 500 mg, Oral, Daily    aspirin (ECOTRIN LOW STRENGTH) 81 mg, Oral, Daily    atorvastatin (LIPITOR) 80 mg, Oral, Daily    calcium carbonate (OS-ANDRÉS) 600 mg, 2 times daily with meals    Eliquis 5 mg, Oral, 2 times daily    ferrous sulfate 325 mg, Oral, Daily with breakfast    lisinopril (ZESTRIL) 5 mg, Oral, Daily    lisinopril (ZESTRIL) 5 mg, Oral, Daily    magnesium 30 mg    metoprolol succinate (TOPROL-XL) 100 mg, Oral, Daily    Multiple Vitamin (multivitamin) tablet 1 tablet, Daily    mupirocin (BACTROBAN) 2 % ointment APPLY EVERY DAY TO TWICE DAILY TO SURGICAL WOUND FOR 1-2 WEEKS UNTIL COMPLETELY HEALED    Omega-3 2 g, Daily    omeprazole (PRILOSEC) 20 mg, Oral, Daily        Social History     Socioeconomic History    Marital status: /Civil Union     Spouse name: Not on file    Number of children: Not on file    Years of education: Not on file    Highest education level: Not on file   Occupational History    Not on file   Tobacco Use    Smoking status: Never    Smokeless tobacco: Never   Vaping Use    Vaping status: Never Used   Substance and  Sexual Activity    Alcohol use: Not Currently     Alcohol/week: 28.0 standard drinks of alcohol     Types: 14 Glasses of wine, 14 Shots of liquor per week     Comment: 2 small glasses of wine & 2 shots whiskey daily    Drug use: Not Currently    Sexual activity: Yes     Partners: Female   Other Topics Concern    Not on file   Social History Narrative    Not on file     Social Drivers of Health     Financial Resource Strain: Low Risk  (1/13/2025)    Received from [facility]    Overall Financial Resource Strain (CARDIA)     Difficulty of Paying Living Expenses: Not hard at all   Food Insecurity: No Food Insecurity (1/13/2025)    Received from [facility]    Hunger Vital Sign     Within the past 12 months, you worried that your food would run out before you got the money to buy more.: Never true     Within the past 12 months, the food you bought just didn't last and you didn't have money to get more.: Never true   Transportation Needs: No Transportation Needs (1/13/2025)    Received from [facility]    PRAPARE - Transportation     Lack of Transportation (Medical): No     Lack of Transportation (Non-Medical): No   Physical Activity: Not on file   Stress: Not on file   Social Connections: Not on file   Intimate Partner Violence: Patient Unable To Answer (1/13/2025)    Received from [facility]    Humiliation, Afraid, Rape, and Kick questionnaire     Within the last year, have you been afraid of your partner or ex-partner?: Patient unable to answer     Within the last year, have you been humiliated or emotionally abused in other ways by your partner or ex-partner?: Patient unable to answer     Within the last year, have you been kicked, hit, slapped, or otherwise physically hurt by your partner or ex-partner?: Patient unable to answer     Within the last year, have you been raped or forced to have any kind of sexual activity by your partner or  "ex-partner?: Patient unable to answer   Housing Stability: Low Risk  (1/13/2025)    Received from [facility]    Housing Stability Vital Sign     In the last 12 months, was there a time when you were not able to pay the mortgage or rent on time?: No     In the past 12 months, how many times have you moved where you were living?: 1     At any time in the past 12 months, were you homeless or living in a shelter (including now)?: No       No family history on file.    Physical Exam:  Vitals:    07/18/25 1151   BP: 138/82   BP Location: Left arm   Patient Position: Sitting   Cuff Size: Standard   Pulse: 70   SpO2: 98%   Weight: 91.5 kg (201 lb 11.2 oz)   Height: 5' 11\" (1.803 m)     Constitutional: NAD, non toxic  Ears/nose/mouth/throat: atraumatic  CV: RRR, 1/6 systolic murmur, no JVD, no HJR  Resp: ctabl  GI: Soft, NTND  MSK: no swollen joints in exposed areas  Extr: No edema, WWP  Pysche: Normal affect  Neuro: appropriate in conversation  Skin: dry and intact in exposed areas    Labs & Results:    Lab Results   Component Value Date    WBC 6.49 11/08/2024    HGB 13.4 01/14/2025    HCT 39.2 01/14/2025    MCV 94 11/08/2024     11/08/2024     Lab Results   Component Value Date    SODIUM 138 01/14/2025    K 3.9 01/14/2025     01/14/2025    CO2 28 01/14/2025    BUN 14 01/14/2025    CREATININE 0.71 01/14/2025    GLUC 130 (H) 01/14/2025    CALCIUM 8.7 01/14/2025     No results found for: \"BNP\"   Lab Results   Component Value Date    CHOLESTEROL 133 11/08/2024    CHOLESTEROL 238 (H) 09/23/2022     Lab Results   Component Value Date    HDL 57 11/08/2024    HDL 75 09/23/2022     Lab Results   Component Value Date    TRIG 67 11/08/2024    TRIG 76 09/23/2022     Lab Results   Component Value Date    NONHDLC 76 11/08/2024    NONHDLC 163 09/23/2022       Counseling / Coordination of Care  Greater than 50% of total time was spent with the patient and / or family counseling and / or coordination of care. " We discussed diagnoses, most recent studies and any changes in treatment.    Thank you for the opportunity to participate in the care of this patient.    Long Marmolejo MD  Attending Physician  Advanced Heart Failure and Transplant Cardiology  [facility]

## 2025-07-18 ENCOUNTER — OFFICE VISIT (OUTPATIENT)
Dept: CARDIOLOGY CLINIC | Facility: CLINIC | Age: 75
End: 2025-07-18
Payer: COMMERCIAL

## 2025-07-18 VITALS
HEART RATE: 70 BPM | WEIGHT: 201.7 LBS | BODY MASS INDEX: 28.24 KG/M2 | OXYGEN SATURATION: 98 % | SYSTOLIC BLOOD PRESSURE: 138 MMHG | HEIGHT: 71 IN | DIASTOLIC BLOOD PRESSURE: 82 MMHG

## 2025-07-18 DIAGNOSIS — I71.21 ANEURYSM OF ASCENDING AORTA WITHOUT RUPTURE (HCC): Primary | ICD-10-CM

## 2025-07-18 DIAGNOSIS — Q23.81 BICUSPID AORTIC VALVE: ICD-10-CM

## 2025-07-18 DIAGNOSIS — I48.91 ATRIAL FIBRILLATION, UNSPECIFIED TYPE (HCC): ICD-10-CM

## 2025-07-18 DIAGNOSIS — I71.21 ANEURYSM OF ASCENDING AORTA WITHOUT RUPTURE (HCC): ICD-10-CM

## 2025-07-18 DIAGNOSIS — Z95.2 S/P AVR (AORTIC VALVE REPLACEMENT): Primary | ICD-10-CM

## 2025-07-18 PROCEDURE — 99214 OFFICE O/P EST MOD 30 MIN: CPT | Performed by: STUDENT IN AN ORGANIZED HEALTH CARE EDUCATION/TRAINING PROGRAM

## 2025-07-18 RX ORDER — PHENOL 1.4 %
600 AEROSOL, SPRAY (ML) MUCOUS MEMBRANE 2 TIMES DAILY WITH MEALS
COMMUNITY

## (undated) DEVICE — SUT VICRYL PLUS 1 CTB-1 36 IN VCPB947H

## (undated) DEVICE — SURGICEL NU-KNIT 3 X 4

## (undated) DEVICE — INTENDED FOR TISSUE SEPARATION, AND OTHER PROCEDURES THAT REQUIRE A SHARP SURGICAL BLADE TO PUNCTURE OR CUT.: Brand: BARD-PARKER SAFETY BLADES SIZE 15, STERILE

## (undated) DEVICE — SYRINGE 50ML LL

## (undated) DEVICE — 40601 PROLONGED POSITIONING SYSTEM: Brand: 40601 PROLONGED POSITIONING SYSTEM

## (undated) DEVICE — ACE WRAP 6 IN UNSTERILE

## (undated) DEVICE — PLEDGET CARDIO PTFE 9.5 X 4.8 SOFT LF (6EA/PK)

## (undated) DEVICE — 32 FR RIGHT ANGLE – SOFT PVC CATHETER: Brand: PVC THORACIC CATHETERS

## (undated) DEVICE — PENCIL ELECTROSURG E-Z CLEAN -0035H

## (undated) DEVICE — SUT SILK 0 CT-1 30 IN 424H

## (undated) DEVICE — FILTER SMOKE EVAC VIROSAFE

## (undated) DEVICE — GLOVE SRG BIOGEL ECLIPSE 8

## (undated) DEVICE — TRAY FOLEY 16FR SURESTEP TEMP SENS URIMETER STAT LOK

## (undated) DEVICE — PACK CABG PBDS

## (undated) DEVICE — SUT PROLENE 4-0 BB 36 IN 8581H

## (undated) DEVICE — LIGHT HANDLE COVER SLEEVE DISP BLUE STELLAR

## (undated) DEVICE — 32 FR STRAIGHT – SOFT PVC CATHETER: Brand: PVC THORACIC CATHETERS

## (undated) DEVICE — BLADE BEAVER MINI SZ 69

## (undated) DEVICE — ELECTRODE BLADE E-Z CLEAN 4IN -0014A

## (undated) DEVICE — SUT SILK 2-0 18 IN A185H

## (undated) DEVICE — Device: Brand: RETRACT-O-TAPE 18G X 30.5CM BLUNT NEEDLE

## (undated) DEVICE — HEMOSTATIC MATRIX SURGIFLO 8ML W/THROMBIN

## (undated) DEVICE — EVERGRIP INSERT SET 86MM: Brand: FOGARTY EVERGRIP

## (undated) DEVICE — SILVER-COATED ANTIBACTERIAL BARRIER DRESSING: Brand: ACTICOAT SURGIC 10X12CM 5PK US

## (undated) DEVICE — GLOVE INDICATOR PI UNDERGLOVE SZ 8 BLUE

## (undated) DEVICE — VASOVIEW HEMOPRO 2: Brand: VASOVIEW HEMOPRO 2

## (undated) DEVICE — RED RUBBER URETHRAL CATHETER: Brand: DOVER

## (undated) DEVICE — CATH GUIDE LAUNCHER 6FR EBU 3.75

## (undated) DEVICE — GAUZE SPONGES,16 PLY: Brand: CURITY

## (undated) DEVICE — GUIDEWIRE WHOLEY HI TORQUE INTERM MOD J .035 145CM

## (undated) DEVICE — HEMOCLIP CARTRIDGE LRG

## (undated) DEVICE — BONE WAX WHITE: Brand: BONE WAX WHITE

## (undated) DEVICE — SUT MONOCRYL PLUS 3-0 PS-2 27 IN MCP427H

## (undated) DEVICE — SUT PROLENE 5-0 C-1/C-1 36 IN 8321H

## (undated) DEVICE — SUT PROLENE 7-0 BV-1/BV-1 24 IN 8304H

## (undated) DEVICE — INTENDED FOR TISSUE SEPARATION, AND OTHER PROCEDURES THAT REQUIRE A SHARP SURGICAL BLADE TO PUNCTURE OR CUT.: Brand: BARD-PARKER ® CARBON RIB-BACK BLADES

## (undated) DEVICE — SUT SILK 2 60 IN SA8H

## (undated) DEVICE — SUCTION CATH 18 FR

## (undated) DEVICE — RECIP.STERNUM SAW BLADE 34/7.5/0.7MM: Brand: AESCULAP

## (undated) DEVICE — STERNAL WIRE

## (undated) DEVICE — SUT PDS PLUS 1 CTB 36 IN PDPB359T

## (undated) DEVICE — TUBING INSUFFLATION SET ISO CONNECTOR

## (undated) DEVICE — SUT MONOCRYL 4-0 PS-2 18 IN Y496G

## (undated) DEVICE — ANTIBACTERIAL UNDYED BRAIDED (POLYGLACTIN 910), SYNTHETIC ABSORBABLE SUTURE: Brand: COATED VICRYL

## (undated) DEVICE — BLANKET HYPOTHERMIA ADULT GAYMAR

## (undated) DEVICE — AORTIC PUNCH 5.2 MM DISP

## (undated) DEVICE — DRESSING ALLEVYN LIFE HEEL 25 X 25.2CM

## (undated) DEVICE — TR BAND RADIAL ARTERY COMPRESSION DEVICE: Brand: TR BAND

## (undated) DEVICE — SILVER-COATED ANTIBACTERIAL BARRIER DRESSING: Brand: ACTICOAT SURGIC 10X25CM 5PK US

## (undated) DEVICE — SUT ETHIBOND 2-0 V-5/V-5 30 IN PXX52

## (undated) DEVICE — RADIFOCUS OPTITORQUE ANGIOGRAPHIC CATHETER: Brand: OPTITORQUE

## (undated) DEVICE — SUT PROLENE 7-0 BV175-8/BV175-8 24 IN EPM8747

## (undated) DEVICE — OASIS DRAIN, SINGLE, INLINE & ATS COMPATIBLE: Brand: OASIS

## (undated) DEVICE — 3000CC GUARDIAN II: Brand: GUARDIAN

## (undated) DEVICE — PLUMEPEN PRO 10FT

## (undated) DEVICE — SUT SILK 2-0 SH CR/8 18 IN C012D

## (undated) DEVICE — ADHESIVE SKIN HIGH VISCOSITY EXOFIN 1ML

## (undated) DEVICE — SUT ETHIBOND 2-0 SH/SH 36 IN X523H

## (undated) DEVICE — SUT ETHIBOND 2-0 SH-1/SH-1 30 IN X763H

## (undated) DEVICE — LIGACLIP MCA MULTIPLE CLIP APPLIERS, 20 SMALL CLIPS: Brand: LIGACLIP

## (undated) DEVICE — PACK VALVE PBDS

## (undated) DEVICE — GLIDESHEATH BASIC HYDROPHILIC COATED INTRODUCER SHEATH: Brand: GLIDESHEATH

## (undated) DEVICE — THERMOFLECT BLANKET, L, 25EA                               TS THERMOFLECT BLANKET, 48" X 84", SILVER, 5/BG, 5 BG/CS NW: Brand: THERMOFLECT

## (undated) DEVICE — ALCON OPHTHALMIC KNIFE 15 °: Brand: ALCON